# Patient Record
Sex: FEMALE | Race: WHITE | Employment: UNEMPLOYED | URBAN - METROPOLITAN AREA
[De-identification: names, ages, dates, MRNs, and addresses within clinical notes are randomized per-mention and may not be internally consistent; named-entity substitution may affect disease eponyms.]

---

## 2017-04-21 ENCOUNTER — APPOINTMENT (OUTPATIENT)
Dept: GENERAL RADIOLOGY | Age: 22
End: 2017-04-21
Payer: MEDICARE

## 2017-04-21 ENCOUNTER — HOSPITAL ENCOUNTER (EMERGENCY)
Age: 22
Discharge: HOME | End: 2017-04-21
Attending: EMERGENCY MEDICINE
Payer: MEDICARE

## 2017-04-21 VITALS
WEIGHT: 255 LBS | TEMPERATURE: 98.6 F | OXYGEN SATURATION: 96 % | DIASTOLIC BLOOD PRESSURE: 82 MMHG | BODY MASS INDEX: 45.18 KG/M2 | HEIGHT: 63 IN | RESPIRATION RATE: 16 BRPM | SYSTOLIC BLOOD PRESSURE: 124 MMHG | HEART RATE: 87 BPM

## 2017-04-21 DIAGNOSIS — J06.9 VIRAL URI WITH COUGH: Primary | ICD-10-CM

## 2017-04-21 PROCEDURE — 6360000002 HC RX W HCPCS: Performed by: EMERGENCY MEDICINE

## 2017-04-21 PROCEDURE — 71020 XR CHEST STANDARD TWO VW: CPT

## 2017-04-21 PROCEDURE — 99283 EMERGENCY DEPT VISIT LOW MDM: CPT

## 2017-04-21 RX ORDER — ONDANSETRON 4 MG/1
4 TABLET, FILM COATED ORAL ONCE
Status: COMPLETED | OUTPATIENT
Start: 2017-04-21 | End: 2017-04-21

## 2017-04-21 RX ORDER — BENZONATATE 200 MG/1
200 CAPSULE ORAL 3 TIMES DAILY PRN
Qty: 30 CAPSULE | Refills: 0 | Status: SHIPPED | OUTPATIENT
Start: 2017-04-21 | End: 2017-07-16

## 2017-04-21 RX ORDER — ONDANSETRON 4 MG/1
4 TABLET, FILM COATED ORAL EVERY 8 HOURS PRN
Qty: 20 TABLET | Refills: 0 | Status: SHIPPED | OUTPATIENT
Start: 2017-04-21 | End: 2019-03-08

## 2017-04-21 RX ADMIN — ONDANSETRON 4 MG: 4 TABLET, FILM COATED ORAL at 18:32

## 2017-04-21 ASSESSMENT — PAIN DESCRIPTION - DESCRIPTORS: DESCRIPTORS: BURNING

## 2017-04-21 ASSESSMENT — ENCOUNTER SYMPTOMS
NAUSEA: 1
VOMITING: 1
ABDOMINAL PAIN: 0
SHORTNESS OF BREATH: 1
COUGH: 1
WHEEZING: 0
RHINORRHEA: 1
COLOR CHANGE: 0

## 2017-04-21 ASSESSMENT — PAIN DESCRIPTION - LOCATION: LOCATION: THROAT

## 2017-04-21 ASSESSMENT — PAIN DESCRIPTION - FREQUENCY: FREQUENCY: CONTINUOUS

## 2017-04-21 ASSESSMENT — PAIN DESCRIPTION - PAIN TYPE: TYPE: ACUTE PAIN

## 2017-04-21 ASSESSMENT — PAIN DESCRIPTION - ORIENTATION: ORIENTATION: RIGHT;LEFT

## 2017-04-21 ASSESSMENT — PAIN SCALES - GENERAL: PAINLEVEL_OUTOF10: 3

## 2017-07-16 ENCOUNTER — HOSPITAL ENCOUNTER (EMERGENCY)
Age: 22
Discharge: HOME | End: 2017-07-16
Attending: EMERGENCY MEDICINE
Payer: MEDICARE

## 2017-07-16 VITALS
DIASTOLIC BLOOD PRESSURE: 74 MMHG | WEIGHT: 259.92 LBS | BODY MASS INDEX: 46.05 KG/M2 | RESPIRATION RATE: 22 BRPM | HEIGHT: 63 IN | OXYGEN SATURATION: 97 % | SYSTOLIC BLOOD PRESSURE: 122 MMHG | HEART RATE: 78 BPM | TEMPERATURE: 98.8 F

## 2017-07-16 DIAGNOSIS — N93.8 DUB (DYSFUNCTIONAL UTERINE BLEEDING): Primary | ICD-10-CM

## 2017-07-16 LAB — HCG(URINE) PREGNANCY TEST: NEGATIVE

## 2017-07-16 PROCEDURE — 99284 EMERGENCY DEPT VISIT MOD MDM: CPT

## 2017-07-16 PROCEDURE — 81003 URINALYSIS AUTO W/O SCOPE: CPT

## 2017-07-16 PROCEDURE — 84703 CHORIONIC GONADOTROPIN ASSAY: CPT

## 2017-07-16 ASSESSMENT — PAIN DESCRIPTION - LOCATION: LOCATION: ABDOMEN

## 2017-07-16 ASSESSMENT — PAIN DESCRIPTION - ORIENTATION: ORIENTATION: LOWER

## 2017-07-16 ASSESSMENT — PAIN DESCRIPTION - PAIN TYPE: TYPE: ACUTE PAIN

## 2017-07-16 ASSESSMENT — ENCOUNTER SYMPTOMS
EYES NEGATIVE: 1
RESPIRATORY NEGATIVE: 1

## 2017-07-16 ASSESSMENT — PAIN SCALES - GENERAL: PAINLEVEL_OUTOF10: 7

## 2017-11-15 ENCOUNTER — HOSPITAL ENCOUNTER (EMERGENCY)
Age: 22
Discharge: HOME | End: 2017-11-15
Attending: EMERGENCY MEDICINE
Payer: MEDICARE

## 2017-11-15 ENCOUNTER — APPOINTMENT (OUTPATIENT)
Dept: CT IMAGING | Age: 22
End: 2017-11-15
Payer: MEDICARE

## 2017-11-15 VITALS
SYSTOLIC BLOOD PRESSURE: 135 MMHG | TEMPERATURE: 97.7 F | DIASTOLIC BLOOD PRESSURE: 84 MMHG | OXYGEN SATURATION: 97 % | BODY MASS INDEX: 43.77 KG/M2 | WEIGHT: 247 LBS | HEIGHT: 63 IN | HEART RATE: 69 BPM | RESPIRATION RATE: 16 BRPM
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
## 2017-11-15 DIAGNOSIS — S40.021A: ICD-10-CM

## 2017-11-15 DIAGNOSIS — S40.022A: ICD-10-CM

## 2017-11-15 DIAGNOSIS — S80.10XA CONTUSION OF MULTIPLE SITES OF LOWER EXTREMITY, UNSPECIFIED LATERALITY, INITIAL ENCOUNTER: ICD-10-CM
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
## 2017-11-15 DIAGNOSIS — S19.9XXA SOFT TISSUE INJURY OF NECK, INITIAL ENCOUNTER: ICD-10-CM

## 2017-11-15 LAB
ABSOLUTE EOS #: 0 K/UL (ref 0–0.4)
ABSOLUTE IMMATURE GRANULOCYTE: ABNORMAL K/UL (ref 0–0.3)
ABSOLUTE LYMPH #: 1.9 K/UL (ref 1–4.8)
ABSOLUTE MONO #: 0.5 K/UL (ref 0.2–0.8)
ANION GAP SERPL CALCULATED.3IONS-SCNC: 14 MMOL/L (ref 9–17)
BASOPHILS # BLD: 1 %
BASOPHILS ABSOLUTE: 0 K/UL (ref 0–0.2)
BUN BLDV-MCNC: 9 MG/DL (ref 6–20)
BUN/CREAT BLD: 13 (ref 9–20)
CALCIUM SERPL-MCNC: 9.2 MG/DL (ref 8.6–10.4)
CHLORIDE BLD-SCNC: 103 MMOL/L (ref 98–107)
CO2: 22 MMOL/L (ref 20–31)
CREAT SERPL-MCNC: 0.69 MG/DL (ref 0.5–0.9)
DIFFERENTIAL TYPE: ABNORMAL
EOSINOPHILS RELATIVE PERCENT: 1 %
GFR AFRICAN AMERICAN: >60 ML/MIN
GFR NON-AFRICAN AMERICAN: >60 ML/MIN
GFR SERPL CREATININE-BSD FRML MDRD: ABNORMAL ML/MIN/{1.73_M2}
GFR SERPL CREATININE-BSD FRML MDRD: ABNORMAL ML/MIN/{1.73_M2}
GLUCOSE BLD-MCNC: 101 MG/DL (ref 70–99)
HCT VFR BLD CALC: 42.1 % (ref 36–46)
HEMOGLOBIN: 13.9 G/DL (ref 12–16)
IMMATURE GRANULOCYTES: ABNORMAL %
LYMPHOCYTES # BLD: 31 %
MCH RBC QN AUTO: 28.6 PG (ref 26–34)
MCHC RBC AUTO-ENTMCNC: 33.1 G/DL (ref 31–37)
MCV RBC AUTO: 86.4 FL (ref 80–100)
MONOCYTES # BLD: 9 %
PDW BLD-RTO: 16.2 % (ref 11.5–14.5)
PLATELET # BLD: 175 K/UL (ref 130–400)
PLATELET ESTIMATE: ABNORMAL
PMV BLD AUTO: 8.9 FL (ref 6–12)
POTASSIUM SERPL-SCNC: 3.6 MMOL/L (ref 3.7–5.3)
RBC # BLD: 4.88 M/UL (ref 4–5.2)
RBC # BLD: ABNORMAL 10*6/UL
SEG NEUTROPHILS: 58 %
SEGMENTED NEUTROPHILS ABSOLUTE COUNT: 3.6 K/UL (ref 1.8–7.7)
SODIUM BLD-SCNC: 139 MMOL/L (ref 135–144)
WBC # BLD: 6.1 K/UL (ref 3.5–11)
WBC # BLD: ABNORMAL 10*3/UL

## 2017-11-15 PROCEDURE — 84703 CHORIONIC GONADOTROPIN ASSAY: CPT

## 2017-11-15 PROCEDURE — 99285 EMERGENCY DEPT VISIT HI MDM: CPT

## 2017-11-15 PROCEDURE — 96374 THER/PROPH/DIAG INJ IV PUSH: CPT

## 2017-11-15 PROCEDURE — 2580000003 HC RX 258: Performed by: NURSE PRACTITIONER

## 2017-11-15 PROCEDURE — 6360000002 HC RX W HCPCS: Performed by: NURSE PRACTITIONER

## 2017-11-15 PROCEDURE — 80048 BASIC METABOLIC PNL TOTAL CA: CPT

## 2017-11-15 PROCEDURE — 70498 CT ANGIOGRAPHY NECK: CPT

## 2017-11-15 PROCEDURE — 81003 URINALYSIS AUTO W/O SCOPE: CPT

## 2017-11-15 PROCEDURE — 6360000004 HC RX CONTRAST MEDICATION: Performed by: NURSE PRACTITIONER

## 2017-11-15 PROCEDURE — 85025 COMPLETE CBC W/AUTO DIFF WBC: CPT

## 2017-11-15 RX ORDER — 0.9 % SODIUM CHLORIDE 0.9 %
1000 INTRAVENOUS SOLUTION INTRAVENOUS ONCE
Status: COMPLETED | OUTPATIENT
Start: 2017-11-15 | End: 2017-11-15

## 2017-11-15 RX ORDER — ESCITALOPRAM OXALATE 10 MG/1
10 TABLET ORAL DAILY
COMMUNITY
End: 2019-03-08

## 2017-11-15 RX ORDER — SODIUM CHLORIDE 0.9 % (FLUSH) 0.9 %
10 SYRINGE (ML) INJECTION
Status: COMPLETED | OUTPATIENT
Start: 2017-11-15 | End: 2017-11-15

## 2017-11-15 RX ORDER — LORAZEPAM 2 MG/ML
1 INJECTION INTRAMUSCULAR ONCE
Status: COMPLETED | OUTPATIENT
Start: 2017-11-15 | End: 2017-11-15

## 2017-11-15 RX ORDER — 0.9 % SODIUM CHLORIDE 0.9 %
50 INTRAVENOUS SOLUTION INTRAVENOUS ONCE
Status: COMPLETED | OUTPATIENT
Start: 2017-11-15 | End: 2017-11-15

## 2017-11-15 RX ADMIN — IOPAMIDOL 100 ML: 755 INJECTION, SOLUTION INTRAVENOUS at 17:15

## 2017-11-15 RX ADMIN — Medication 10 ML: at 17:15

## 2017-11-15 RX ADMIN — SODIUM CHLORIDE 50 ML: 9 INJECTION, SOLUTION INTRAVENOUS at 17:15

## 2017-11-15 RX ADMIN — SODIUM CHLORIDE 1000 ML: 9 INJECTION, SOLUTION INTRAVENOUS at 17:10

## 2017-11-15 RX ADMIN — LORAZEPAM 1 MG: 2 INJECTION INTRAMUSCULAR at 17:09

## 2017-11-15 ASSESSMENT — PAIN SCALES - GENERAL: PAINLEVEL_OUTOF10: 8
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
## 2017-11-16 LAB — HCG, PREGNANCY URINE (POC): NEGATIVE

## 2018-01-25 ENCOUNTER — HOSPITAL ENCOUNTER (EMERGENCY)
Age: 23
Discharge: HOME | End: 2018-01-25
Attending: EMERGENCY MEDICINE
Payer: MEDICARE

## 2018-01-25 VITALS
RESPIRATION RATE: 18 BRPM | SYSTOLIC BLOOD PRESSURE: 115 MMHG | WEIGHT: 248.06 LBS | DIASTOLIC BLOOD PRESSURE: 73 MMHG | TEMPERATURE: 97.4 F | HEART RATE: 87 BPM | HEIGHT: 63 IN | BODY MASS INDEX: 43.95 KG/M2 | OXYGEN SATURATION: 98 %

## 2018-01-25 DIAGNOSIS — B34.9 VIRAL ILLNESS: Primary | ICD-10-CM

## 2018-01-25 LAB
CHP ED QC CHECK: YES
DIRECT EXAM: NORMAL
Lab: NORMAL
PREGNANCY TEST URINE, POC: NEGATIVE
SPECIMEN DESCRIPTION: NORMAL
STATUS: NORMAL

## 2018-01-25 PROCEDURE — 84703 CHORIONIC GONADOTROPIN ASSAY: CPT

## 2018-01-25 PROCEDURE — 99283 EMERGENCY DEPT VISIT LOW MDM: CPT

## 2018-01-25 PROCEDURE — 87651 STREP A DNA AMP PROBE: CPT

## 2018-01-25 PROCEDURE — 81003 URINALYSIS AUTO W/O SCOPE: CPT

## 2018-01-25 RX ORDER — ONDANSETRON 4 MG/1
4 TABLET, ORALLY DISINTEGRATING ORAL EVERY 8 HOURS PRN
Qty: 20 TABLET | Refills: 0 | Status: SHIPPED | OUTPATIENT
Start: 2018-01-25 | End: 2019-03-11

## 2018-01-25 ASSESSMENT — ENCOUNTER SYMPTOMS
CONSTIPATION: 0
TROUBLE SWALLOWING: 0
SORE THROAT: 1
DIARRHEA: 0
VOMITING: 1
COUGH: 0
NAUSEA: 1
SHORTNESS OF BREATH: 0
ABDOMINAL PAIN: 0

## 2018-01-25 ASSESSMENT — PAIN DESCRIPTION - DESCRIPTORS: DESCRIPTORS: STABBING;SHARP

## 2018-01-25 ASSESSMENT — PAIN DESCRIPTION - LOCATION: LOCATION: THROAT

## 2018-01-25 ASSESSMENT — PAIN SCALES - GENERAL: PAINLEVEL_OUTOF10: 7

## 2018-01-26 LAB
DIRECT EXAM: NORMAL
DIRECT EXAM: NORMAL
HCG, PREGNANCY URINE (POC): NEGATIVE
Lab: NORMAL
Lab: NORMAL
SPECIMEN DESCRIPTION: NORMAL
SPECIMEN DESCRIPTION: NORMAL
STATUS: NORMAL

## 2018-01-26 NOTE — ED PROVIDER NOTES
[address] [place] [address] ED  eMERGENCY dEPARTMENT eNCOUnter      Pt Name: Curtis Villalobos  MRN: [id number]  [place] 1995  Date of evaluation: 1/25/2018  Provider: Ezra Pritchard NP    [address] [place] [address]       Chief Complaint   Patient presents with    Pharyngitis     past 2 wks / tx at  yesterday    Emesis     intermittent past 2 wks         HISTORY OF PRESENT ILLNESS  (Location/Symptom, Timing/Onset, Context/Setting, Quality, Duration, Modifying Factors, Severity.)   Curtis Villalobos is a 23 y.o. female who presents to the emergency department Review of private auto with complaint of sore throat that started 2 weeks ago. Additional symptoms include constant nausea and occasional emesis with body aches and some blurred vision. She was seen at an urgent care 2 days ago and had a negative strep screen. She was started on a Medrol Dosepak which she started 2 days ago but has not had any improvement. She denies fevers. No abdominal pain, back pain or urinary complaints. LMP was January 15. Nursing Notes were reviewed.     ALLERGIES     Sulfa antibiotics    CURRENT MEDICATIONS       Discharge Medication List as of 1/25/2018 10:32 PM      CONTINUE these medications which have NOT CHANGED    Details   AMOXICILLIN PO Take by mouth Unsure of dose / started 1/24/18Historical Med      MethylPREDNISolone (MEDROL PO) Take by mouth dosepak started 1/24/18Historical Med      escitalopram (LEXAPRO) 10 MG tablet Take 10 mg by mouth dailyHistorical Med      lurasidone (LATUDA) 20 MG TABS tablet Take 20 mg by mouth dailyHistorical Med      Prazosin HCl (MINIPRESS PO) Take by mouth dailyHistorical Med      ondansetron (ZOFRAN) 4 MG tablet Take 1 tablet by mouth every 8 hours as needed for Nausea, Disp-20 tablet, R-0Print      albuterol sulfate HFA (PROVENTIL HFA) 108 (90 BASE) MCG/ACT inhaler Inhale 1-2 puffs into the lungs every 4 hours as needed for Wheezing or Shortness of Breath (Space out to every 6 hours as symptoms

## 2018-07-01 ENCOUNTER — APPOINTMENT (OUTPATIENT)
Dept: GENERAL RADIOLOGY | Age: 23
End: 2018-07-01
Payer: MEDICARE

## 2018-07-01 ENCOUNTER — HOSPITAL ENCOUNTER (EMERGENCY)
Age: 23
Discharge: HOME | End: 2018-07-01
Attending: EMERGENCY MEDICINE
Payer: MEDICARE

## 2018-07-01 VITALS
HEIGHT: 63 IN | OXYGEN SATURATION: 99 % | DIASTOLIC BLOOD PRESSURE: 80 MMHG | BODY MASS INDEX: 44.35 KG/M2 | TEMPERATURE: 98.4 F | RESPIRATION RATE: 18 BRPM | SYSTOLIC BLOOD PRESSURE: 117 MMHG | WEIGHT: 250.3 LBS | HEART RATE: 76 BPM

## 2018-07-01 DIAGNOSIS — N39.0 URINARY TRACT INFECTION WITHOUT HEMATURIA, SITE UNSPECIFIED: ICD-10-CM

## 2018-07-01 DIAGNOSIS — J40 BRONCHITIS: Primary | ICD-10-CM

## 2018-07-01 LAB
-: ABNORMAL
AMORPHOUS: ABNORMAL
BACTERIA: ABNORMAL
BILIRUBIN URINE: NEGATIVE
CASTS UA: ABNORMAL /LPF
CHP ED QC CHECK: NORMAL
COLOR: YELLOW
COMMENT UA: ABNORMAL
CRYSTALS, UA: ABNORMAL /HPF
EPITHELIAL CELLS UA: ABNORMAL /HPF (ref 0–5)
GLUCOSE URINE: NEGATIVE
KETONES, URINE: NEGATIVE
LEUKOCYTE ESTERASE, URINE: ABNORMAL
MUCUS: ABNORMAL
NITRITE, URINE: NEGATIVE
OTHER OBSERVATIONS UA: ABNORMAL
PH UA: 5.5 (ref 5–8)
PREGNANCY TEST URINE, POC: NEGATIVE
PROTEIN UA: NEGATIVE
RBC UA: ABNORMAL /HPF (ref 0–2)
RENAL EPITHELIAL, UA: ABNORMAL /HPF
SPECIFIC GRAVITY UA: 1 (ref 1–1.03)
TRICHOMONAS: ABNORMAL
TURBIDITY: CLEAR
URINE HGB: NEGATIVE
UROBILINOGEN, URINE: NORMAL
WBC UA: ABNORMAL /HPF (ref 0–5)
YEAST: ABNORMAL

## 2018-07-01 PROCEDURE — 87086 URINE CULTURE/COLONY COUNT: CPT

## 2018-07-01 PROCEDURE — 71046 X-RAY EXAM CHEST 2 VIEWS: CPT

## 2018-07-01 PROCEDURE — 99283 EMERGENCY DEPT VISIT LOW MDM: CPT

## 2018-07-01 PROCEDURE — 81001 URINALYSIS AUTO W/SCOPE: CPT

## 2018-07-01 RX ORDER — ONDANSETRON 4 MG/1
4 TABLET, ORALLY DISINTEGRATING ORAL EVERY 8 HOURS PRN
Qty: 20 TABLET | Refills: 0 | Status: SHIPPED | OUTPATIENT
Start: 2018-07-01 | End: 2019-03-08

## 2018-07-01 RX ORDER — CEPHALEXIN 500 MG/1
500 CAPSULE ORAL 2 TIMES DAILY
Qty: 10 CAPSULE | Refills: 0 | Status: SHIPPED | OUTPATIENT
Start: 2018-07-01 | End: 2018-07-06

## 2018-07-01 RX ORDER — GUAIFENESIN AND CODEINE PHOSPHATE 100; 10 MG/5ML; MG/5ML
5 SOLUTION ORAL 3 TIMES DAILY PRN
Qty: 120 ML | Refills: 0 | Status: SHIPPED | OUTPATIENT
Start: 2018-07-01 | End: 2018-07-08

## 2018-07-01 RX ORDER — ALBUTEROL SULFATE 90 UG/1
1-2 AEROSOL, METERED RESPIRATORY (INHALATION) EVERY 4 HOURS PRN
Qty: 1 INHALER | Refills: 0 | Status: SHIPPED | OUTPATIENT
Start: 2018-07-01 | End: 2019-03-08

## 2018-07-01 ASSESSMENT — PAIN SCALES - GENERAL: PAINLEVEL_OUTOF10: 8

## 2018-07-01 ASSESSMENT — PAIN SCALES - WONG BAKER: WONGBAKER_NUMERICALRESPONSE: 8

## 2018-07-01 ASSESSMENT — PAIN DESCRIPTION - LOCATION: LOCATION: CHEST;HEAD;BACK

## 2018-07-01 ASSESSMENT — ENCOUNTER SYMPTOMS
ABDOMINAL PAIN: 0
NAUSEA: 0
COUGH: 1
CHEST TIGHTNESS: 0
BACK PAIN: 1
VOMITING: 0
TROUBLE SWALLOWING: 0
SHORTNESS OF BREATH: 0
SORE THROAT: 0

## 2018-07-01 ASSESSMENT — PAIN DESCRIPTION - DESCRIPTORS: DESCRIPTORS: ACHING

## 2018-07-01 NOTE — ED NOTES
Ill today with cough and congestion with sore throat and body aches smokes taking mucinex without relief. Harsh cough noted resp non labored abd obese soft.      Monika Domingo RN  07/01/18 [id number]

## 2018-07-02 LAB
CULTURE: NORMAL
Lab: NORMAL
SPECIMEN DESCRIPTION: NORMAL
STATUS: NORMAL

## 2018-08-06 ENCOUNTER — APPOINTMENT (OUTPATIENT)
Dept: GENERAL RADIOLOGY | Age: 23
End: 2018-08-06
Payer: MEDICARE

## 2018-08-06 ENCOUNTER — HOSPITAL ENCOUNTER (EMERGENCY)
Age: 23
Discharge: HOME | End: 2018-08-06
Attending: EMERGENCY MEDICINE
Payer: MEDICARE

## 2018-08-06 VITALS
SYSTOLIC BLOOD PRESSURE: 108 MMHG | OXYGEN SATURATION: 96 % | TEMPERATURE: 98.5 F | HEIGHT: 63 IN | HEART RATE: 80 BPM | RESPIRATION RATE: 16 BRPM | DIASTOLIC BLOOD PRESSURE: 69 MMHG | WEIGHT: 263 LBS | BODY MASS INDEX: 46.6 KG/M2

## 2018-08-06 DIAGNOSIS — R09.1 PLEURISY: Primary | ICD-10-CM

## 2018-08-06 LAB
ABSOLUTE EOS #: 0.3 K/UL (ref 0–0.4)
ABSOLUTE IMMATURE GRANULOCYTE: ABNORMAL K/UL (ref 0–0.3)
ABSOLUTE LYMPH #: 2.1 K/UL (ref 1–4.8)
ABSOLUTE MONO #: 0.6 K/UL (ref 0.2–0.8)
ANION GAP SERPL CALCULATED.3IONS-SCNC: 11 MMOL/L (ref 9–17)
BASOPHILS # BLD: 1 % (ref 0–2)
BASOPHILS ABSOLUTE: 0.1 K/UL (ref 0–0.2)
BUN BLDV-MCNC: 14 MG/DL (ref 6–20)
BUN/CREAT BLD: 27 (ref 9–20)
CALCIUM SERPL-MCNC: 9 MG/DL (ref 8.6–10.4)
CHLORIDE BLD-SCNC: 106 MMOL/L (ref 98–107)
CO2: 24 MMOL/L (ref 20–31)
CREAT SERPL-MCNC: 0.52 MG/DL (ref 0.5–0.9)
D-DIMER QUANTITATIVE: 0.41 MG/L FEU
DIFFERENTIAL TYPE: ABNORMAL
EKG ATRIAL RATE: 75 BPM
EKG P AXIS: 44 DEGREES
EKG P-R INTERVAL: 142 MS
EKG Q-T INTERVAL: 398 MS
EKG QRS DURATION: 84 MS
EKG QTC CALCULATION (BAZETT): 444 MS
EKG R AXIS: 30 DEGREES
EKG T AXIS: -5 DEGREES
EKG VENTRICULAR RATE: 75 BPM
EOSINOPHILS RELATIVE PERCENT: 3 % (ref 1–4)
GFR AFRICAN AMERICAN: >60 ML/MIN
GFR NON-AFRICAN AMERICAN: >60 ML/MIN
GFR SERPL CREATININE-BSD FRML MDRD: ABNORMAL ML/MIN/{1.73_M2}
GFR SERPL CREATININE-BSD FRML MDRD: ABNORMAL ML/MIN/{1.73_M2}
GLUCOSE BLD-MCNC: 100 MG/DL (ref 70–99)
HCT VFR BLD CALC: 41.5 % (ref 36–46)
HEMOGLOBIN: 13.7 G/DL (ref 12–16)
IMMATURE GRANULOCYTES: ABNORMAL %
LYMPHOCYTES # BLD: 20 % (ref 24–44)
MCH RBC QN AUTO: 29.9 PG (ref 26–34)
MCHC RBC AUTO-ENTMCNC: 33 G/DL (ref 31–37)
MCV RBC AUTO: 90.8 FL (ref 80–100)
MONOCYTES # BLD: 6 % (ref 1–7)
NRBC AUTOMATED: ABNORMAL PER 100 WBC
PDW BLD-RTO: 15 % (ref 11.5–14.5)
PLATELET # BLD: 206 K/UL (ref 130–400)
PLATELET ESTIMATE: ABNORMAL
PMV BLD AUTO: 8.6 FL (ref 6–12)
POTASSIUM SERPL-SCNC: 4 MMOL/L (ref 3.7–5.3)
RBC # BLD: 4.57 M/UL (ref 4–5.2)
RBC # BLD: ABNORMAL 10*6/UL
SEG NEUTROPHILS: 70 % (ref 36–66)
SEGMENTED NEUTROPHILS ABSOLUTE COUNT: 7.3 K/UL (ref 1.8–7.7)
SODIUM BLD-SCNC: 141 MMOL/L (ref 135–144)
WBC # BLD: 10.4 K/UL (ref 3.5–11)
WBC # BLD: ABNORMAL 10*3/UL

## 2018-08-06 PROCEDURE — 80048 BASIC METABOLIC PNL TOTAL CA: CPT

## 2018-08-06 PROCEDURE — 85025 COMPLETE CBC W/AUTO DIFF WBC: CPT

## 2018-08-06 PROCEDURE — 71101 X-RAY EXAM UNILAT RIBS/CHEST: CPT

## 2018-08-06 PROCEDURE — 99284 EMERGENCY DEPT VISIT MOD MDM: CPT

## 2018-08-06 PROCEDURE — 93005 ELECTROCARDIOGRAM TRACING: CPT

## 2018-08-06 PROCEDURE — 96374 THER/PROPH/DIAG INJ IV PUSH: CPT

## 2018-08-06 PROCEDURE — 85379 FIBRIN DEGRADATION QUANT: CPT

## 2018-08-06 PROCEDURE — 6360000002 HC RX W HCPCS: Performed by: PHYSICIAN ASSISTANT

## 2018-08-06 RX ORDER — KETOROLAC TROMETHAMINE 30 MG/ML
30 INJECTION, SOLUTION INTRAMUSCULAR; INTRAVENOUS ONCE
Status: COMPLETED | OUTPATIENT
Start: 2018-08-06 | End: 2018-08-06

## 2018-08-06 RX ADMIN — KETOROLAC TROMETHAMINE 30 MG: 30 INJECTION, SOLUTION INTRAMUSCULAR at 15:48

## 2018-08-06 ASSESSMENT — PAIN SCALES - WONG BAKER: WONGBAKER_NUMERICALRESPONSE: 6

## 2018-08-06 ASSESSMENT — PAIN SCALES - GENERAL
PAINLEVEL_OUTOF10: 6
PAINLEVEL_OUTOF10: 6

## 2018-08-06 ASSESSMENT — PAIN DESCRIPTION - DESCRIPTORS: DESCRIPTORS: TIGHTNESS;PRESSURE

## 2018-08-06 ASSESSMENT — PAIN DESCRIPTION - LOCATION: LOCATION: RIB CAGE;CHEST

## 2018-08-06 ASSESSMENT — PAIN DESCRIPTION - FREQUENCY: FREQUENCY: CONTINUOUS

## 2018-08-06 ASSESSMENT — PAIN DESCRIPTION - ORIENTATION: ORIENTATION: LEFT

## 2018-08-06 NOTE — ED PROVIDER NOTES
(LEXAPRO) 10 MG tablet Take 10 mg by mouth dailyHistorical Med      lurasidone (LATUDA) 20 MG TABS tablet Take 20 mg by mouth dailyHistorical Med      Prazosin HCl (MINIPRESS PO) Take by mouth dailyHistorical Med      ondansetron (ZOFRAN) 4 MG tablet Take 1 tablet by mouth every 8 hours as needed for Nausea, Disp-20 tablet, R-0Print      naproxen (NAPROSYN) 500 MG tablet Take 1 tablet by mouth 2 times daily (with meals), Disp-60 tablet, R-1      calcium carbonate (CALCIUM 600) 600 MG TABS tablet Take 1 tablet by mouth 2 times daily, Disp-60 tablet, R-5      Cholecalciferol (VITAMIN D) 2000 UNITS CAPS capsule Take 1 capsule by mouth daily, Disp-30 capsule, R-5Normal       !! - Potential duplicate medications found. Please discuss with provider. PAST MEDICAL HISTORY         Diagnosis Date    Anemia     Anxiety     Bipolar disorder ([address]     Depression     Obesity     Seizures ([facility] [address] [facility]        SURGICAL HISTORY           Procedure Laterality Date    CHOLECYSTECTOMY, LAPAROSCOPIC  5-20-16    ENDOMETRIAL ABLATION      EYE SURGERY Left     cyst removed    HERNIA REPAIR      INTRAUTERINE DEVICE INSERTION      removed July 16, 2015    TONSILLECTOMY AND ADENOIDECTOMY      TUBAL LIGATION      WISDOM TOOTH EXTRACTION           FAMILY HISTORY       Family History   Problem Relation Age of Onset    Diabetes Maternal Grandmother     Heart Disease Maternal Grandmother      Family Status   Relation Status    MGM (Not Specified)        SOCIAL HISTORY      reports that she has been smoking Cigarettes. She has a 5.00 pack-year smoking history. She has never used smokeless tobacco. She reports that she uses drugs, including Marijuana, about 7 times per week. She reports that she does not drink alcohol. REVIEW OF SYSTEMS    (2-9 systems for level 4, 10 or more for level 5)   Review of Systems    Except as noted above the remainder of the review of systems was reviewed and negative.      PHYSICAL EXAM    (up to 7 for level 4, 8 or more for level 5)     ED Triage Vitals [08/06/18 1418]   BP Temp Temp Source Pulse Resp SpO2 Height Weight   108/69 98.5 °F (36.9 °C) Oral 80 16 96 % 5' 3\" (1.6 m) 263 lb (119.3 kg)     Physical Exam   Constitutional: She is oriented to person, place, and time. She appears well-developed. HENT:   Head: Normocephalic and atraumatic. Neck: Normal range of motion. Neck supple. Cardiovascular: Normal rate and regular rhythm. Pulmonary/Chest: Effort normal and breath sounds normal.   Abdominal: Soft. There is no tenderness. There is no guarding and no CVA tenderness. Musculoskeletal: Normal range of motion. Neurological: She is alert and oriented to person, place, and time. Skin: Skin is warm. No rash noted. Psychiatric: She has a normal mood and affect. Her behavior is normal.               DIAGNOSTIC RESULTS     EKG: All EKG's are interpreted by the Emergency Department Physician who either signs or Co-signs this chart in the absence of a cardiologist.    EKG interpreted by ED physician. Time 1531.  75 beats per minute. Normal axis. No ST segment elevation.     RADIOLOGY:   Non-plain film images such as CT, Ultrasound and MRI are read by the radiologist. Plain radiographic images are visualized and preliminarily interpreted by the emergency physician with the below findings:        Interpretation per the Radiologist below, if available at the time of this note:          ED BEDSIDE ULTRASOUND:   Performed by ED Physician - none    LABS:  Labs Reviewed   CBC WITH AUTO DIFFERENTIAL - Abnormal; Notable for the following:        Result Value    RDW 15.0 (*)     Seg Neutrophils 70 (*)     Lymphocytes 20 (*)     All other components within normal limits   BASIC METABOLIC PANEL - Abnormal; Notable for the following:     Glucose 100 (*)     Bun/Cre Ratio 27 (*)     All other components within normal limits   D-DIMER, QUANTITATIVE       All other labs were within normal range or not returned as of this dictation. EMERGENCY DEPARTMENT COURSE and DIFFERENTIAL DIAGNOSIS/MDM:   Vitals:    Vitals:    08/06/18 1418   BP: 108/69   Pulse: 80   Resp: 16   Temp: 98.5 °F (36.9 °C)   TempSrc: Oral   SpO2: 96%   Weight: 263 lb (119.3 kg)   Height: 5' 3\" (1.6 m)     Patient will be discharged home. D-dimer negative. Chest x-ray negative. Labs within normal limits as well. CONSULTS:  None    PROCEDURES:  Procedures        FINAL IMPRESSION      1.  Pleurisy          DISPOSITION/PLAN   DISPOSITION Decision To Discharge 08/06/2018 04:52:24 PM      PATIENT REFERRED TO:   GIO Lr - CNP  [id number]    In 3 days        DISCHARGE MEDICATIONS:     Discharge Medication List as of 8/6/2018  4:55 PM              (Please note that portions of this note were completed with a voice recognition program.  Efforts were made to edit the dictations but occasionally words are mis-transcribed.)    GEORGE Gillis PA-C  08/06/18 1929

## 2019-03-06 ENCOUNTER — HOSPITAL ENCOUNTER (EMERGENCY)
Age: 24
Discharge: HOME | End: 2019-03-06
Attending: EMERGENCY MEDICINE
Payer: MEDICARE

## 2019-03-06 VITALS
BODY MASS INDEX: 46.78 KG/M2 | WEIGHT: 264 LBS | HEIGHT: 63 IN | DIASTOLIC BLOOD PRESSURE: 56 MMHG | TEMPERATURE: 97.9 F | RESPIRATION RATE: 16 BRPM | SYSTOLIC BLOOD PRESSURE: 111 MMHG | HEART RATE: 76 BPM | OXYGEN SATURATION: 98 %

## 2019-03-06 DIAGNOSIS — S61.411A LACERATION OF RIGHT HAND WITHOUT FOREIGN BODY, INITIAL ENCOUNTER: Primary | ICD-10-CM

## 2019-03-06 PROCEDURE — 2500000003 HC RX 250 WO HCPCS: Performed by: PHYSICIAN ASSISTANT

## 2019-03-06 PROCEDURE — 6370000000 HC RX 637 (ALT 250 FOR IP): Performed by: PHYSICIAN ASSISTANT

## 2019-03-06 PROCEDURE — 90471 IMMUNIZATION ADMIN: CPT | Performed by: PHYSICIAN ASSISTANT

## 2019-03-06 PROCEDURE — 12001 RPR S/N/AX/GEN/TRNK 2.5CM/<: CPT

## 2019-03-06 PROCEDURE — 6360000002 HC RX W HCPCS: Performed by: PHYSICIAN ASSISTANT

## 2019-03-06 PROCEDURE — 90715 TDAP VACCINE 7 YRS/> IM: CPT | Performed by: PHYSICIAN ASSISTANT

## 2019-03-06 PROCEDURE — 99282 EMERGENCY DEPT VISIT SF MDM: CPT

## 2019-03-06 RX ORDER — LIDOCAINE HYDROCHLORIDE 10 MG/ML
20 INJECTION, SOLUTION INFILTRATION; PERINEURAL ONCE
Status: COMPLETED | OUTPATIENT
Start: 2019-03-06 | End: 2019-03-06

## 2019-03-06 RX ORDER — ACETAMINOPHEN 500 MG
1000 TABLET ORAL ONCE
Status: COMPLETED | OUTPATIENT
Start: 2019-03-06 | End: 2019-03-06

## 2019-03-06 RX ADMIN — ACETAMINOPHEN 1000 MG: 500 TABLET ORAL at 15:47

## 2019-03-06 RX ADMIN — LIDOCAINE HYDROCHLORIDE 20 ML: 10 INJECTION, SOLUTION INFILTRATION; PERINEURAL at 15:48

## 2019-03-06 RX ADMIN — TETANUS TOXOID, REDUCED DIPHTHERIA TOXOID AND ACELLULAR PERTUSSIS VACCINE, ADSORBED 0.5 ML: 5; 2.5; 8; 8; 2.5 SUSPENSION INTRAMUSCULAR at 16:38

## 2019-03-06 ASSESSMENT — PAIN DESCRIPTION - LOCATION: LOCATION: HAND

## 2019-03-06 ASSESSMENT — PAIN DESCRIPTION - ORIENTATION: ORIENTATION: RIGHT

## 2019-03-06 ASSESSMENT — PAIN SCALES - GENERAL
PAINLEVEL_OUTOF10: 8
PAINLEVEL_OUTOF10: 8

## 2019-03-06 ASSESSMENT — PAIN DESCRIPTION - PAIN TYPE: TYPE: ACUTE PAIN

## 2019-03-07 ENCOUNTER — TELEPHONE (OUTPATIENT)
Dept: FAMILY MEDICINE CLINIC | Age: 24
End: 2019-03-07

## 2019-03-08 ENCOUNTER — OFFICE VISIT (OUTPATIENT)
Dept: FAMILY MEDICINE CLINIC | Age: 24
End: 2019-03-08
Payer: MEDICARE

## 2019-03-08 VITALS
TEMPERATURE: 98.4 F | WEIGHT: 264 LBS | HEART RATE: 78 BPM | SYSTOLIC BLOOD PRESSURE: 122 MMHG | RESPIRATION RATE: 18 BRPM | DIASTOLIC BLOOD PRESSURE: 78 MMHG | BODY MASS INDEX: 46.77 KG/M2

## 2019-03-08 DIAGNOSIS — Z23 NEED FOR PNEUMOCOCCAL VACCINATION: ICD-10-CM

## 2019-03-08 DIAGNOSIS — S61.411A LACERATION OF RIGHT HAND, FOREIGN BODY PRESENCE UNSPECIFIED, INITIAL ENCOUNTER: Primary | ICD-10-CM

## 2019-03-08 PROCEDURE — 90471 IMMUNIZATION ADMIN: CPT | Performed by: NURSE PRACTITIONER

## 2019-03-08 PROCEDURE — 90732 PPSV23 VACC 2 YRS+ SUBQ/IM: CPT | Performed by: NURSE PRACTITIONER

## 2019-03-08 PROCEDURE — 99214 OFFICE O/P EST MOD 30 MIN: CPT | Performed by: NURSE PRACTITIONER

## 2019-03-08 PROCEDURE — G8417 CALC BMI ABV UP PARAM F/U: HCPCS | Performed by: NURSE PRACTITIONER

## 2019-03-08 PROCEDURE — G8427 DOCREV CUR MEDS BY ELIG CLIN: HCPCS | Performed by: NURSE PRACTITIONER

## 2019-03-08 PROCEDURE — G8482 FLU IMMUNIZE ORDER/ADMIN: HCPCS | Performed by: NURSE PRACTITIONER

## 2019-03-08 PROCEDURE — 4004F PT TOBACCO SCREEN RCVD TLK: CPT | Performed by: NURSE PRACTITIONER

## 2019-03-08 RX ORDER — HYDROCODONE BITARTRATE AND ACETAMINOPHEN 5; 325 MG/1; MG/1
1 TABLET ORAL EVERY 6 HOURS PRN
Qty: 12 TABLET | Refills: 0 | Status: SHIPPED | OUTPATIENT
Start: 2019-03-08 | End: 2019-03-11

## 2019-03-08 RX ORDER — LAMOTRIGINE 200 MG/1
TABLET ORAL
Refills: 0 | COMMUNITY
Start: 2019-02-22 | End: 2020-08-26 | Stop reason: ALTCHOICE

## 2019-03-08 ASSESSMENT — PATIENT HEALTH QUESTIONNAIRE - PHQ9
2. FEELING DOWN, DEPRESSED OR HOPELESS: 1
SUM OF ALL RESPONSES TO PHQ QUESTIONS 1-9: 1
1. LITTLE INTEREST OR PLEASURE IN DOING THINGS: 0
SUM OF ALL RESPONSES TO PHQ QUESTIONS 1-9: 1
SUM OF ALL RESPONSES TO PHQ9 QUESTIONS 1 & 2: 1

## 2019-03-11 ENCOUNTER — HOSPITAL ENCOUNTER (EMERGENCY)
Age: 24
Discharge: HOME | End: 2019-03-11
Payer: MEDICARE

## 2019-03-11 VITALS
RESPIRATION RATE: 18 BRPM | BODY MASS INDEX: 46.78 KG/M2 | TEMPERATURE: 98.1 F | DIASTOLIC BLOOD PRESSURE: 48 MMHG | OXYGEN SATURATION: 97 % | SYSTOLIC BLOOD PRESSURE: 106 MMHG | HEIGHT: 63 IN | WEIGHT: 264 LBS | HEART RATE: 105 BPM

## 2019-03-11 DIAGNOSIS — R51.9 NONINTRACTABLE HEADACHE, UNSPECIFIED CHRONICITY PATTERN, UNSPECIFIED HEADACHE TYPE: Primary | ICD-10-CM

## 2019-03-11 DIAGNOSIS — T14.8XXA BLISTER: ICD-10-CM

## 2019-03-11 DIAGNOSIS — B34.9 VIRAL ILLNESS: ICD-10-CM

## 2019-03-11 LAB
-: ABNORMAL
AMORPHOUS: ABNORMAL
BACTERIA: ABNORMAL
BILIRUBIN URINE: NEGATIVE
CASTS UA: ABNORMAL /LPF
CHP ED QC CHECK: NORMAL
COLOR: YELLOW
COMMENT UA: ABNORMAL
CRYSTALS, UA: ABNORMAL /HPF
DIRECT EXAM: NORMAL
EPITHELIAL CELLS UA: ABNORMAL /HPF (ref 0–5)
GLUCOSE URINE: NEGATIVE
KETONES, URINE: ABNORMAL
LEUKOCYTE ESTERASE, URINE: ABNORMAL
Lab: NORMAL
MUCUS: ABNORMAL
NITRITE, URINE: NEGATIVE
OTHER OBSERVATIONS UA: ABNORMAL
PH UA: 6 (ref 5–8)
PREGNANCY TEST URINE, POC: NORMAL
PROTEIN UA: NEGATIVE
RBC UA: ABNORMAL /HPF (ref 0–2)
RENAL EPITHELIAL, UA: ABNORMAL /HPF
SPECIFIC GRAVITY UA: 1.01 (ref 1–1.03)
SPECIMEN DESCRIPTION: NORMAL
TRICHOMONAS: ABNORMAL
TURBIDITY: ABNORMAL
URINE HGB: NEGATIVE
UROBILINOGEN, URINE: NORMAL
WBC UA: ABNORMAL /HPF (ref 0–5)
YEAST: ABNORMAL

## 2019-03-11 PROCEDURE — 2580000003 HC RX 258: Performed by: NURSE PRACTITIONER

## 2019-03-11 PROCEDURE — 6360000002 HC RX W HCPCS: Performed by: NURSE PRACTITIONER

## 2019-03-11 PROCEDURE — 10160 PNXR ASPIR ABSC HMTMA BULLA: CPT

## 2019-03-11 PROCEDURE — 84703 CHORIONIC GONADOTROPIN ASSAY: CPT

## 2019-03-11 PROCEDURE — 99283 EMERGENCY DEPT VISIT LOW MDM: CPT

## 2019-03-11 PROCEDURE — 81001 URINALYSIS AUTO W/SCOPE: CPT

## 2019-03-11 PROCEDURE — 96372 THER/PROPH/DIAG INJ SC/IM: CPT

## 2019-03-11 PROCEDURE — 87086 URINE CULTURE/COLONY COUNT: CPT

## 2019-03-11 PROCEDURE — 87804 INFLUENZA ASSAY W/OPTIC: CPT

## 2019-03-11 RX ORDER — BUTALBITAL, ACETAMINOPHEN AND CAFFEINE 50; 325; 40 MG/1; MG/1; MG/1
1 TABLET ORAL EVERY 4 HOURS PRN
Qty: 20 TABLET | Refills: 0 | Status: SHIPPED | OUTPATIENT
Start: 2019-03-11 | End: 2020-07-29 | Stop reason: SDUPTHER

## 2019-03-11 RX ORDER — ONDANSETRON 4 MG/1
4 TABLET, ORALLY DISINTEGRATING ORAL EVERY 8 HOURS PRN
Qty: 20 TABLET | Refills: 0 | Status: SHIPPED | OUTPATIENT
Start: 2019-03-11 | End: 2020-07-29 | Stop reason: SDUPTHER

## 2019-03-11 RX ORDER — KETOROLAC TROMETHAMINE 30 MG/ML
30 INJECTION, SOLUTION INTRAMUSCULAR; INTRAVENOUS ONCE
Status: COMPLETED | OUTPATIENT
Start: 2019-03-11 | End: 2019-03-11

## 2019-03-11 RX ORDER — 0.9 % SODIUM CHLORIDE 0.9 %
1000 INTRAVENOUS SOLUTION INTRAVENOUS ONCE
Status: COMPLETED | OUTPATIENT
Start: 2019-03-11 | End: 2019-03-11

## 2019-03-11 RX ADMIN — SODIUM CHLORIDE 1000 ML: 9 INJECTION, SOLUTION INTRAVENOUS at 15:58

## 2019-03-11 RX ADMIN — KETOROLAC TROMETHAMINE 30 MG: 30 INJECTION, SOLUTION INTRAMUSCULAR; INTRAVENOUS at 15:02

## 2019-03-11 ASSESSMENT — PAIN DESCRIPTION - LOCATION: LOCATION: HEAD

## 2019-03-11 ASSESSMENT — PAIN DESCRIPTION - DESCRIPTORS: DESCRIPTORS: POUNDING;PRESSURE

## 2019-03-11 ASSESSMENT — PAIN SCALES - GENERAL: PAINLEVEL_OUTOF10: 8

## 2019-03-12 LAB
CULTURE: NORMAL
HCG, PREGNANCY URINE (POC): NEGATIVE
Lab: NORMAL
SPECIMEN DESCRIPTION: NORMAL

## 2019-03-24 ASSESSMENT — ENCOUNTER SYMPTOMS
COUGH: 0
SHORTNESS OF BREATH: 0
NAUSEA: 0

## 2019-12-09 ENCOUNTER — HOSPITAL ENCOUNTER (OUTPATIENT)
Age: 24
Setting detail: SPECIMEN
Discharge: HOME | End: 2019-12-09
Payer: MEDICARE

## 2019-12-09 ENCOUNTER — OFFICE VISIT (OUTPATIENT)
Dept: FAMILY MEDICINE CLINIC | Age: 24
End: 2019-12-09
Payer: MEDICARE

## 2019-12-09 VITALS
WEIGHT: 277 LBS | DIASTOLIC BLOOD PRESSURE: 70 MMHG | RESPIRATION RATE: 18 BRPM | SYSTOLIC BLOOD PRESSURE: 122 MMHG | HEART RATE: 92 BPM | TEMPERATURE: 98.2 F | BODY MASS INDEX: 49.07 KG/M2

## 2019-12-09 DIAGNOSIS — R10.84 GENERALIZED ABDOMINAL PAIN: ICD-10-CM

## 2019-12-09 DIAGNOSIS — Z83.49 FAMILY HISTORY OF THYROID DISEASE: ICD-10-CM

## 2019-12-09 DIAGNOSIS — Z00.00 WELL ADULT EXAM: ICD-10-CM

## 2019-12-09 DIAGNOSIS — R19.7 DIARRHEA, UNSPECIFIED TYPE: ICD-10-CM

## 2019-12-09 DIAGNOSIS — Z83.3 FAMILY HISTORY OF DIABETES MELLITUS: ICD-10-CM

## 2019-12-09 DIAGNOSIS — E66.01 MORBID OBESITY (HCC): ICD-10-CM

## 2019-12-09 DIAGNOSIS — R42 DIZZINESS: ICD-10-CM

## 2019-12-09 DIAGNOSIS — F43.10 PTSD (POST-TRAUMATIC STRESS DISORDER): ICD-10-CM

## 2019-12-09 DIAGNOSIS — R30.0 DYSURIA: ICD-10-CM

## 2019-12-09 DIAGNOSIS — F41.9 ANXIETY: Primary | ICD-10-CM

## 2019-12-09 LAB
ALBUMIN SERPL-MCNC: 3.9 G/DL (ref 3.5–5.2)
ALBUMIN/GLOBULIN RATIO: 1.3 (ref 1–2.5)
ALP BLD-CCNC: 92 U/L (ref 35–104)
ALT SERPL-CCNC: 31 U/L (ref 5–33)
ANION GAP SERPL CALCULATED.3IONS-SCNC: 13 MMOL/L (ref 9–17)
AST SERPL-CCNC: 18 U/L
BILIRUB SERPL-MCNC: 0.27 MG/DL (ref 0.3–1.2)
BILIRUBIN, POC: ABNORMAL
BLOOD URINE, POC: ABNORMAL
BUN BLDV-MCNC: 8 MG/DL (ref 6–20)
BUN/CREAT BLD: ABNORMAL (ref 9–20)
CALCIUM SERPL-MCNC: 9.3 MG/DL (ref 8.6–10.4)
CHLORIDE BLD-SCNC: 107 MMOL/L (ref 98–107)
CHOLESTEROL/HDL RATIO: 6.2
CHOLESTEROL: 193 MG/DL
CLARITY, POC: ABNORMAL
CO2: 21 MMOL/L (ref 20–31)
COLOR, POC: ABNORMAL
CREAT SERPL-MCNC: 0.64 MG/DL (ref 0.5–0.9)
ESTIMATED AVERAGE GLUCOSE: 108 MG/DL
GFR AFRICAN AMERICAN: >60 ML/MIN
GFR NON-AFRICAN AMERICAN: >60 ML/MIN
GFR SERPL CREATININE-BSD FRML MDRD: ABNORMAL ML/MIN/{1.73_M2}
GFR SERPL CREATININE-BSD FRML MDRD: ABNORMAL ML/MIN/{1.73_M2}
GLUCOSE BLD-MCNC: 82 MG/DL (ref 70–99)
GLUCOSE URINE, POC: NEGATIVE
HBA1C MFR BLD: 5.4 % (ref 4–6)
HCT VFR BLD CALC: 44.4 % (ref 36.3–47.1)
HDLC SERPL-MCNC: 31 MG/DL
HEMOGLOBIN: 14.3 G/DL (ref 11.9–15.1)
KETONES, POC: NEGATIVE
LDL CHOLESTEROL: 129 MG/DL (ref 0–130)
LEUKOCYTE EST, POC: ABNORMAL
MCH RBC QN AUTO: 29.7 PG (ref 25.2–33.5)
MCHC RBC AUTO-ENTMCNC: 32.2 G/DL (ref 28.4–34.8)
MCV RBC AUTO: 92.3 FL (ref 82.6–102.9)
NITRITE, POC: NEGATIVE
NRBC AUTOMATED: 0 PER 100 WBC
PDW BLD-RTO: 13.6 % (ref 11.8–14.4)
PH, POC: 6
PLATELET # BLD: 199 K/UL (ref 138–453)
PMV BLD AUTO: 11.2 FL (ref 8.1–13.5)
POTASSIUM SERPL-SCNC: 4.2 MMOL/L (ref 3.7–5.3)
PROTEIN, POC: 30
RBC # BLD: 4.81 M/UL (ref 3.95–5.11)
SODIUM BLD-SCNC: 141 MMOL/L (ref 135–144)
SPECIFIC GRAVITY, POC: >=1.03
T3 FREE: 2.56 PG/ML (ref 2.02–4.43)
THYROXINE, FREE: 0.74 NG/DL (ref 0.93–1.7)
TOTAL PROTEIN: 6.8 G/DL (ref 6.4–8.3)
TRIGL SERPL-MCNC: 165 MG/DL
TSH SERPL DL<=0.05 MIU/L-ACNC: 1.17 MIU/L (ref 0.3–5)
UROBILINOGEN, POC: 0.2
VLDLC SERPL CALC-MCNC: ABNORMAL MG/DL (ref 1–30)
WBC # BLD: 9.4 K/UL (ref 3.5–11.3)

## 2019-12-09 PROCEDURE — 99214 OFFICE O/P EST MOD 30 MIN: CPT | Performed by: NURSE PRACTITIONER

## 2019-12-09 PROCEDURE — G8417 CALC BMI ABV UP PARAM F/U: HCPCS | Performed by: NURSE PRACTITIONER

## 2019-12-09 PROCEDURE — G8482 FLU IMMUNIZE ORDER/ADMIN: HCPCS | Performed by: NURSE PRACTITIONER

## 2019-12-09 PROCEDURE — 4004F PT TOBACCO SCREEN RCVD TLK: CPT | Performed by: NURSE PRACTITIONER

## 2019-12-09 PROCEDURE — G8427 DOCREV CUR MEDS BY ELIG CLIN: HCPCS | Performed by: NURSE PRACTITIONER

## 2019-12-09 RX ORDER — PROPRANOLOL HYDROCHLORIDE 10 MG/1
TABLET ORAL 2 TIMES DAILY
Refills: 0 | COMMUNITY
Start: 2019-11-22 | End: 2021-04-07

## 2019-12-09 RX ORDER — DEXTROAMPHETAMINE SACCHARATE, AMPHETAMINE ASPARTATE, DEXTROAMPHETAMINE SULFATE AND AMPHETAMINE SULFATE 2.5; 2.5; 2.5; 2.5 MG/1; MG/1; MG/1; MG/1
TABLET ORAL
Refills: 0 | COMMUNITY
Start: 2019-11-22 | End: 2020-08-26 | Stop reason: ALTCHOICE

## 2019-12-09 RX ORDER — DEXTROAMPHETAMINE SACCHARATE, AMPHETAMINE ASPARTATE MONOHYDRATE, DEXTROAMPHETAMINE SULFATE AND AMPHETAMINE SULFATE 7.5; 7.5; 7.5; 7.5 MG/1; MG/1; MG/1; MG/1
CAPSULE, EXTENDED RELEASE ORAL
Refills: 0 | COMMUNITY
Start: 2019-11-22 | End: 2020-08-26 | Stop reason: ALTCHOICE

## 2019-12-09 ASSESSMENT — ENCOUNTER SYMPTOMS
ABDOMINAL PAIN: 0
TROUBLE SWALLOWING: 0
CHEST TIGHTNESS: 0
RHINORRHEA: 0
DIARRHEA: 1
NAUSEA: 1
WHEEZING: 1
SORE THROAT: 0
VOMITING: 1
SHORTNESS OF BREATH: 1

## 2019-12-10 PROBLEM — F43.10 PTSD (POST-TRAUMATIC STRESS DISORDER): Status: ACTIVE | Noted: 2019-12-10

## 2019-12-10 LAB
CULTURE: NORMAL
Lab: NORMAL
SPECIMEN DESCRIPTION: NORMAL

## 2019-12-13 DIAGNOSIS — R79.89 ABNORMAL SERUM THYROXINE (T4) LEVEL: Primary | ICD-10-CM

## 2020-07-28 ENCOUNTER — NURSE TRIAGE (OUTPATIENT)
Dept: OTHER | Facility: CLINIC | Age: 25
End: 2020-07-28

## 2020-07-28 ENCOUNTER — TELEPHONE (OUTPATIENT)
Dept: FAMILY MEDICINE CLINIC | Age: 25
End: 2020-07-28

## 2020-07-29 ENCOUNTER — HOSPITAL ENCOUNTER (OUTPATIENT)
Age: 25
Setting detail: SPECIMEN
Discharge: HOME | End: 2020-07-29
Payer: MEDICARE

## 2020-07-29 ENCOUNTER — OFFICE VISIT (OUTPATIENT)
Dept: FAMILY MEDICINE CLINIC | Age: 25
End: 2020-07-29
Payer: MEDICARE

## 2020-07-29 ENCOUNTER — HOSPITAL ENCOUNTER (OUTPATIENT)
Dept: CT IMAGING | Age: 25
Discharge: HOME | End: 2020-07-31
Payer: MEDICARE

## 2020-07-29 VITALS
OXYGEN SATURATION: 98 % | WEIGHT: 252.6 LBS | TEMPERATURE: 96.3 F | HEART RATE: 73 BPM | DIASTOLIC BLOOD PRESSURE: 74 MMHG | BODY MASS INDEX: 44.75 KG/M2 | SYSTOLIC BLOOD PRESSURE: 104 MMHG | RESPIRATION RATE: 16 BRPM

## 2020-07-29 PROBLEM — F81.9 DEVELOPMENTAL DISORDER OF SCHOLASTIC SKILL: Status: ACTIVE | Noted: 2020-07-29

## 2020-07-29 PROBLEM — F98.8 ATTENTION DEFICIT DISORDER WITHOUT HYPERACTIVITY: Status: ACTIVE | Noted: 2020-07-29

## 2020-07-29 PROBLEM — N80.9 ENDOMETRIOSIS: Status: ACTIVE | Noted: 2020-07-29

## 2020-07-29 PROBLEM — K21.9 ESOPHAGEAL REFLUX: Status: ACTIVE | Noted: 2020-07-29

## 2020-07-29 PROBLEM — N94.89 PELVIC CONGESTION: Status: ACTIVE | Noted: 2020-07-29

## 2020-07-29 PROBLEM — D69.1 PLATELET DENSE GRANULE DEFICIENCY (HCC): Status: ACTIVE | Noted: 2018-10-11

## 2020-07-29 PROBLEM — D69.1 PLATELET FUNCTION DEFECT (HCC): Status: ACTIVE | Noted: 2020-07-29

## 2020-07-29 PROBLEM — E66.01 MORBID OBESITY (HCC): Status: ACTIVE | Noted: 2020-07-29

## 2020-07-29 PROBLEM — N92.0 EXCESSIVE AND FREQUENT MENSTRUATION: Status: ACTIVE | Noted: 2018-07-18

## 2020-07-29 PROBLEM — Z03.89 OBSERVATION OF OTHER SUSPECTED MENTAL CONDITION: Status: ACTIVE | Noted: 2020-07-29

## 2020-07-29 LAB
-: ABNORMAL
ALBUMIN SERPL-MCNC: 4.2 G/DL (ref 3.5–5.2)
ALBUMIN/GLOBULIN RATIO: 1.8 (ref 1–2.5)
ALP BLD-CCNC: 75 U/L (ref 35–104)
ALT SERPL-CCNC: 33 U/L (ref 5–33)
AMORPHOUS: ABNORMAL
ANION GAP SERPL CALCULATED.3IONS-SCNC: 14 MMOL/L (ref 9–17)
AST SERPL-CCNC: 18 U/L
BACTERIA: ABNORMAL
BILIRUB SERPL-MCNC: 0.29 MG/DL (ref 0.3–1.2)
BILIRUBIN URINE: NEGATIVE
BUN BLDV-MCNC: 12 MG/DL (ref 6–20)
BUN/CREAT BLD: ABNORMAL (ref 9–20)
CALCIUM SERPL-MCNC: 9.4 MG/DL (ref 8.6–10.4)
CASTS UA: ABNORMAL /LPF (ref 0–8)
CHLORIDE BLD-SCNC: 101 MMOL/L (ref 98–107)
CHOLESTEROL/HDL RATIO: 6.6
CHOLESTEROL: 197 MG/DL
CO2: 23 MMOL/L (ref 20–31)
COLOR: YELLOW
COMMENT UA: ABNORMAL
CREAT SERPL-MCNC: 0.58 MG/DL (ref 0.5–0.9)
CRYSTALS, UA: ABNORMAL /HPF
EPITHELIAL CELLS UA: ABNORMAL /HPF (ref 0–5)
ESTIMATED AVERAGE GLUCOSE: 105 MG/DL
FERRITIN: 86 UG/L (ref 13–150)
FOLATE: 4.7 NG/ML
GFR AFRICAN AMERICAN: >60 ML/MIN
GFR NON-AFRICAN AMERICAN: >60 ML/MIN
GFR SERPL CREATININE-BSD FRML MDRD: ABNORMAL ML/MIN/{1.73_M2}
GFR SERPL CREATININE-BSD FRML MDRD: ABNORMAL ML/MIN/{1.73_M2}
GLUCOSE FASTING: 89 MG/DL (ref 70–99)
GLUCOSE URINE: NEGATIVE
HBA1C MFR BLD: 5.3 % (ref 4–6)
HCT VFR BLD CALC: 47.9 % (ref 36.3–47.1)
HDLC SERPL-MCNC: 30 MG/DL
HEMOGLOBIN: 15 G/DL (ref 11.9–15.1)
INSULIN COMMENT: NORMAL
INSULIN REFERENCE RANGE:: NORMAL
INSULIN: 20.4 MU/L
IRON SATURATION: 23 % (ref 20–55)
IRON: 71 UG/DL (ref 37–145)
KETONES, URINE: NEGATIVE
LDL CHOLESTEROL: 127 MG/DL (ref 0–130)
LEUKOCYTE ESTERASE, URINE: ABNORMAL
MCH RBC QN AUTO: 30.5 PG (ref 25.2–33.5)
MCHC RBC AUTO-ENTMCNC: 31.3 G/DL (ref 28.4–34.8)
MCV RBC AUTO: 97.6 FL (ref 82.6–102.9)
MUCUS: ABNORMAL
NITRITE, URINE: NEGATIVE
NRBC AUTOMATED: 0 PER 100 WBC
OTHER OBSERVATIONS UA: ABNORMAL
PDW BLD-RTO: 13.5 % (ref 11.8–14.4)
PH UA: 5.5 (ref 5–8)
PLATELET # BLD: 175 K/UL (ref 138–453)
PMV BLD AUTO: 11.9 FL (ref 8.1–13.5)
POTASSIUM SERPL-SCNC: 4.2 MMOL/L (ref 3.7–5.3)
PROTEIN UA: ABNORMAL
RBC # BLD: 4.91 M/UL (ref 3.95–5.11)
RBC UA: ABNORMAL /HPF (ref 0–4)
RENAL EPITHELIAL, UA: ABNORMAL /HPF
SODIUM BLD-SCNC: 138 MMOL/L (ref 135–144)
SPECIFIC GRAVITY UA: 1.02 (ref 1–1.03)
TOTAL IRON BINDING CAPACITY: 313 UG/DL (ref 250–450)
TOTAL PROTEIN: 6.6 G/DL (ref 6.4–8.3)
TRICHOMONAS: ABNORMAL
TRIGL SERPL-MCNC: 198 MG/DL
TSH SERPL DL<=0.05 MIU/L-ACNC: 1.86 MIU/L (ref 0.3–5)
TURBIDITY: ABNORMAL
UNSATURATED IRON BINDING CAPACITY: 242 UG/DL (ref 112–347)
URINE HGB: NEGATIVE
UROBILINOGEN, URINE: NORMAL
VITAMIN B-12: 290 PG/ML (ref 232–1245)
VITAMIN D 25-HYDROXY: 22.9 NG/ML (ref 30–100)
VLDLC SERPL CALC-MCNC: ABNORMAL MG/DL (ref 1–30)
WBC # BLD: 10.5 K/UL (ref 3.5–11.3)
WBC UA: ABNORMAL /HPF (ref 0–5)
YEAST: ABNORMAL

## 2020-07-29 PROCEDURE — 6360000004 HC RX CONTRAST MEDICATION: Performed by: NURSE PRACTITIONER

## 2020-07-29 PROCEDURE — G8417 CALC BMI ABV UP PARAM F/U: HCPCS | Performed by: NURSE PRACTITIONER

## 2020-07-29 PROCEDURE — 4004F PT TOBACCO SCREEN RCVD TLK: CPT | Performed by: NURSE PRACTITIONER

## 2020-07-29 PROCEDURE — 99215 OFFICE O/P EST HI 40 MIN: CPT | Performed by: NURSE PRACTITIONER

## 2020-07-29 PROCEDURE — 74177 CT ABD & PELVIS W/CONTRAST: CPT

## 2020-07-29 PROCEDURE — 81003 URINALYSIS AUTO W/O SCOPE: CPT | Performed by: NURSE PRACTITIONER

## 2020-07-29 PROCEDURE — G8427 DOCREV CUR MEDS BY ELIG CLIN: HCPCS | Performed by: NURSE PRACTITIONER

## 2020-07-29 PROCEDURE — 2580000003 HC RX 258: Performed by: NURSE PRACTITIONER

## 2020-07-29 RX ORDER — SODIUM CHLORIDE 0.9 % (FLUSH) 0.9 %
10 SYRINGE (ML) INJECTION PRN
Status: DISCONTINUED | OUTPATIENT
Start: 2020-07-29 | End: 2020-08-01 | Stop reason: HOSPADM

## 2020-07-29 RX ORDER — BUTALBITAL, ACETAMINOPHEN AND CAFFEINE 50; 325; 40 MG/1; MG/1; MG/1
1 TABLET ORAL EVERY 4 HOURS PRN
Qty: 20 TABLET | Refills: 0 | Status: SHIPPED | OUTPATIENT
Start: 2020-07-29 | End: 2020-08-27 | Stop reason: SDUPTHER

## 2020-07-29 RX ORDER — ALBUTEROL SULFATE 90 UG/1
2 AEROSOL, METERED RESPIRATORY (INHALATION) EVERY 6 HOURS PRN
COMMUNITY
Start: 2020-02-13 | End: 2022-09-21

## 2020-07-29 RX ORDER — ONDANSETRON 4 MG/1
4 TABLET, ORALLY DISINTEGRATING ORAL EVERY 8 HOURS PRN
Qty: 20 TABLET | Refills: 0 | Status: SHIPPED | OUTPATIENT
Start: 2020-07-29 | End: 2020-09-14 | Stop reason: SDUPTHER

## 2020-07-29 RX ORDER — 0.9 % SODIUM CHLORIDE 0.9 %
80 INTRAVENOUS SOLUTION INTRAVENOUS ONCE
Status: COMPLETED | OUTPATIENT
Start: 2020-07-29 | End: 2020-07-29

## 2020-07-29 RX ADMIN — IOVERSOL 100 ML: 741 INJECTION INTRA-ARTERIAL; INTRAVENOUS at 10:15

## 2020-07-29 RX ADMIN — SODIUM CHLORIDE 80 ML: 9 INJECTION, SOLUTION INTRAVENOUS at 10:15

## 2020-07-29 RX ADMIN — Medication 10 ML: at 10:15

## 2020-07-29 ASSESSMENT — ENCOUNTER SYMPTOMS
ABDOMINAL PAIN: 1
CONSTIPATION: 1
CHEST TIGHTNESS: 0
NAUSEA: 1
BACK PAIN: 0
COUGH: 0
RHINORRHEA: 0
SHORTNESS OF BREATH: 0
DIARRHEA: 0
ABDOMINAL DISTENTION: 1
BLOOD IN STOOL: 0
SORE THROAT: 0
VOMITING: 1
WHEEZING: 0
TROUBLE SWALLOWING: 0
SINUS PRESSURE: 0

## 2020-07-29 ASSESSMENT — PATIENT HEALTH QUESTIONNAIRE - PHQ9
1. LITTLE INTEREST OR PLEASURE IN DOING THINGS: 1
2. FEELING DOWN, DEPRESSED OR HOPELESS: 1
SUM OF ALL RESPONSES TO PHQ QUESTIONS 1-9: 2
SUM OF ALL RESPONSES TO PHQ9 QUESTIONS 1 & 2: 2
SUM OF ALL RESPONSES TO PHQ QUESTIONS 1-9: 2

## 2020-07-29 NOTE — PROGRESS NOTES
[address] [facility]   [address]  [phone]    7/29/2020    Visit Information    Have you changed or started any medications since your last visit including any over-the-counter medicines, vitamins, or herbal medicines? yes - Med list updated   Are you having any side effects from any of your medications? -  Unknown  Have you stopped taking any of your medications? Is so, why? -  yes - Stopped taking most medication due to starting emsam patch    Have you seen any other physician or provider since your last visit? Yes - Records Obtained Urgent care, [address]  Have you had any other diagnostic tests since your last visit? No  Have you been seen in the emergency room and/or had an admission to a hospital since we last saw you? No  Have you had your routine dental cleaning in the past 6 months? yes - 1/20    Have you activated your [employer] account? If not, what are your barriers? Yes     Patient Care Team:  GIO Grossman CNP as PCP - General (Nurse Practitioner Family)  GIO Jimenez CNP as PCP - [facility]      Kristi Yao is a 30 y.o. female who presents today for her  medical conditions/complaints as noted below. Kristi Yao is c/o of Emesis (Onset 1 year intermittent. Still feeling worse after vomiting. Worsening in the last 1-2 weeks.); Medication Check (Treating with THC. No fevers. ); and Other (Family history of thyroid disorder and DM)  . HPI:     Patient is a very pleasant 66-year-old female who presents to the office today along with her mother. Patient states she takes her medications as prescribed. She denies any chest pain, shortness of breath, recent upper respiratory infections or fever. Mother appears to be significantly more worried about patient, as opposed to patient. Patient does live alone. She does have very close to her mother if anything is needed.   It is noted that her brother has after she smokes marijuana. It is discussed with both patient and her mother at visit this is could easily be cyclic vomiting syndrome due to marijuana. We will do additional testing, blood work, and imaging to rule out any other diagnoses. Patient also suffers from migraine headaches. The propranolol that was prescribed, she is told to not take daily as a prophylactic but only take as needed for migraine relief.       Care team includes:  Dr. 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[address]     Depression     Obesity     Seizures ([facility] [address]       Past Surgical History:   Procedure Laterality Date    CHOLECYSTECTOMY, LAPAROSCOPIC  16    ENDOMETRIAL ABLATION  2018    EYE SURGERY Left     cyst removed    HERNIA REPAIR      INTRAUTERINE DEVICE INSERTION      removed 2015    TONSILLECTOMY AND ADENOIDECTOMY      TUBAL LIGATION  2018    WISDOM TOOTH EXTRACTION       Family History   Problem Relation Age of Onset    Diabetes Maternal Grandmother     Heart Disease Maternal Grandmother      Social History     Tobacco Use    Smoking status: Current Some Day Smoker     Packs/day: 1.00     Years: 5.00     Pack years: 5.00     Types: Cigarettes     Last attempt to quit: 2016     Years since quittin.4    Smokeless tobacco: Never Used   Substance Use Topics    Alcohol use: No     Alcohol/week: 0.0 standard drinks     Comment: 2016-stopped drinking      Current Outpatient Medications   Medication Sig Dispense Refill    albuterol sulfate  (90 Base) MCG/ACT inhaler Inhale 2 puffs into the lungs every 6 hours as tightness, shortness of breath and wheezing. Cardiovascular: Positive for palpitations and leg swelling. Negative for chest pain. Gastrointestinal: Positive for abdominal distention, abdominal pain, constipation, nausea and vomiting (coffee grounds at times ). Negative for blood in stool and diarrhea. Genitourinary: Positive for frequency. Negative for decreased urine volume, difficulty urinating, dysuria and hematuria. Musculoskeletal: Negative for arthralgias, back pain, gait problem and myalgias. Arthritic pain bilateral hands, knees, right hip    Skin: Negative for rash. Neurological: Positive for syncope (once), light-headedness (on occasion, position changes ) and headaches (upon awakening). Negative for dizziness, weakness and numbness. Hematological: Bruises/bleeds easily. Psychiatric/Behavioral: Positive for agitation, decreased concentration, self-injury (cutting, in past), sleep disturbance and suicidal ideas (past). The patient is nervous/anxious. Objective:     Physical Exam  Vitals signs and nursing note reviewed. Constitutional:       General: She is not in acute distress. Appearance: Normal appearance. She is well-developed and well-groomed. She is morbidly obese. She is ill-appearing. She is not toxic-appearing. HENT:      Head: Normocephalic. Right Ear: Hearing normal.      Left Ear: Hearing normal.      Mouth/Throat:      Lips: Pink. Mouth: Mucous membranes are moist.      Pharynx: Oropharynx is clear. No oropharyngeal exudate or posterior oropharyngeal erythema. Comments: Leigh Brianne score +4  Eyes:      General: Lids are normal.      Extraocular Movements: Extraocular movements intact. Right eye: No nystagmus. Left eye: No nystagmus. Conjunctiva/sclera: Conjunctivae normal.      Pupils: Pupils are equal, round, and reactive to light. Neck:      Musculoskeletal: No neck rigidity or muscular tenderness.       Thyroid: No thyroid mass or thyroid tenderness. Cardiovascular:      Rate and Rhythm: Normal rate and regular rhythm. Pulses: Normal pulses. Carotid pulses are 2+ on the right side and 2+ on the left side. Radial pulses are 2+ on the right side and 2+ on the left side. Dorsalis pedis pulses are 2+ on the right side and 2+ on the left side. Heart sounds: S1 normal and S2 normal. Heart sounds are distant. No murmur. Pulmonary:      Effort: Pulmonary effort is normal. No respiratory distress. Breath sounds: Normal breath sounds and air entry. No wheezing or rales. Abdominal:      General: Abdomen is protuberant. Bowel sounds are decreased. There is distension. Palpations: Abdomen is soft. Tenderness: There is abdominal tenderness in the suprapubic area. There is no guarding or rebound. Musculoskeletal: Normal range of motion. General: No swelling or tenderness. Right lower le+ Edema present. Left lower le+ Edema present. Lymphadenopathy:      Cervical: No cervical adenopathy. Skin:     General: Skin is warm and dry. Capillary Refill: Capillary refill takes less than 2 seconds. Findings: No bruising, erythema or rash. Neurological:      General: No focal deficit present. Mental Status: She is alert and oriented to person, place, and time. Psychiatric:         Attention and Perception: Attention and perception normal.         Mood and Affect: Mood normal. Affect is labile and flat. Speech: Speech normal.         Behavior: Behavior is slowed. Behavior is cooperative. Thought Content: Thought content normal.         Cognition and Memory: Cognition and memory normal.         Judgment: Judgment normal.          Assessment:      1. Lower abdominal pain  -Worsening   - CBC; Future  - Comprehensive Metabolic Panel, Fasting; Future  - Urinalysis;  Future  - External Referral To Gastroenterology  - CT ABDOMEN PELVIS W IV CONTRAST Additional Contrast? Radiologist Recommendation; Future    2. Coffee ground emesis  -Worsening   - Ferritin; Future  - Iron and TIBC; Future  - External Referral To Gastroenterology  - ondansetron (ZOFRAN ODT) 4 MG disintegrating tablet; Take 1 tablet by mouth every 8 hours as needed for Nausea  Dispense: 20 tablet; Refill: 0  - CT ABDOMEN PELVIS W IV CONTRAST Additional Contrast? Radiologist Recommendation; Future    3. Platelet dense granule deficiency ([facility]    4. Fatigue, unspecified type  -Worsening   - TSH with Reflex; Future  - Thyroid Peroxidase Antibody; Future  - Urinalysis; Future  - Vitamin B12 & Folate; Future  - Vitamin D 25 Hydroxy; Future  - Ferritin; Future  - Iron and TIBC; Future    5. Migraine without aura and without status migrainosus, not intractable  -Stable, medicated, monitered   - butalbital-acetaminophen-caffeine (FIORICET, ESGIC) -40 MG per tablet; Take 1 tablet by mouth every 4 hours as needed for Headaches  Dispense: 20 tablet; Refill: 0    6. Anxiety  - [address] [place] [address]    7. PTSD (post-traumatic stress disorder)  - [address] [place] [address]    8. Attention deficit disorder without hyperactivity  - [address] [place] [address]    9. Vitamin D insufficiency  - Vitamin D 25 Hydroxy; Future    10. Morbid obesity ([facility]    11. Screening for cardiovascular condition  - Lipid Panel; Future  - Urinalysis; Future    12. Screening for diabetes mellitus (DM)  - Hemoglobin A1C; Future  - Insulin, total; Future       Plan:      Return in about 4 weeks (around 8/26/2020).   Orders Placed This Encounter   Procedures    CT ABDOMEN PELVIS W IV CONTRAST Additional Contrast? Radiologist Recommendation     Standing Status:   Future     Number of Occurrences:   1     Standing Expiration Date:   7/29/2021     Order Specific Question:   Additional Contrast?     Answer:   Radiologist Recommendation     Order Specific Question:   Reason for exam:     Answer:   abdominal pain    CBC     Standing Status:   Future     Number of Occurrences:   1     Standing Expiration Date:   7/29/2021    Comprehensive Metabolic Panel, Fasting     Standing Status:   Future     Number of Occurrences:   1     Standing Expiration Date:   7/29/2021    Hemoglobin A1C     Standing Status:   Future     Number of Occurrences:   1     Standing Expiration Date:   7/29/2021    Insulin, total     Standing Status:   Future     Number of Occurrences:   1     Standing Expiration Date:   7/29/2021    Lipid Panel     Standing Status:   Future     Number of Occurrences:   1     Standing Expiration Date:   7/29/2021     Order Specific Question:   Is Patient Fasting?/# of Hours     Answer:   yes    TSH with Reflex     Standing Status:   Future     Number of Occurrences:   1     Standing Expiration Date:   7/29/2021    Thyroid Peroxidase Antibody     Standing Status:   Future     Number of Occurrences:   1     Standing Expiration Date:   7/29/2021    Urinalysis     Standing Status:   Future     Number of Occurrences:   1     Standing Expiration Date:   7/29/2021    Vitamin B12 & Folate     Standing Status:   Future     Number of Occurrences:   1     Standing Expiration Date:   7/29/2021    Vitamin D 25 Hydroxy     Standing Status:   Future     Number of Occurrences:   1     Standing Expiration Date:   7/29/2021    Ferritin     Standing Status:   Future     Number of Occurrences:   1     Standing Expiration Date:   7/29/2021    Iron and TIBC     Standing Status:   Future     Number of Occurrences:   1     Standing Expiration Date:   7/29/2021     Order Specific Question:   Is Patient Fasting? Answer:   yes     Order Specific Question:   No of Hours?      Answer:   15    External Referral To Gastroenterology     Referral Priority:   Urgent     Referral Type:   Consult for Advice and Opinion     Referral Reason:   Specialty Services Required     Referred to Provider:   Toshia Mcdonnell MD     Requested Specialty:   Gastroenterology Number of Visits Requested:   [address]     Referral Priority:   Routine     Referral Type:   Eval and Treat     Referral Reason:   Specialty Services Required     Requested Specialty:   Psychology     Number of Visits Requested:   1     Orders Placed This Encounter   Medications    ondansetron (ZOFRAN ODT) 4 MG disintegrating tablet     Sig: Take 1 tablet by mouth every 8 hours as needed for Nausea     Dispense:  20 tablet     Refill:  0    butalbital-acetaminophen-caffeine (FIORICET, ESGIC) -40 MG per tablet     Sig: Take 1 tablet by mouth every 4 hours as needed for Headaches     Dispense:  20 tablet     Refill:  0       Reviewed health maintenance, prior labs and imaging. Patient given educational materials - see patient instructions. Discussed use, benefit, and side effects of prescribed medications. Barriers to medication compliance addressed. All patient questions answered. Pt voiced understanding to plan of care. Instructed to continue medications as discussed, healthy diet and exercise. Patient agreed with treatment plan. Follow up as directed below. Communication:      Items for Patient/Writer/Staff to Accomplish  Follow up in office if symptoms worsen or persist.  Follow up to be scheduled as discussed. Please ensure referral/consult completed. Quality Measures  BMI Readings from Last 1 Encounters:   07/29/20 44.75 kg/m²        Lab Results   Component Value Date    LABA1C 5.4 12/09/2019       BP Readings from Last 3 Encounters:   07/29/20 104/74   12/09/19 122/70   03/11/19 (!) 106/48        The ASCVD Risk score (Luis E Castro, et al., 2013) failed to calculate for the following reasons:     The 2013 ASCVD risk score is only valid for ages 36 to 78     PHQ Scores 7/29/2020 3/8/2019 10/7/2016   PHQ2 Score 2 1 1   PHQ9 Score 2 1 1     Interpretation of Total Score Depression Severity: 1-4 = Minimal depression, 5-9 = Mild depression, 10-14 = Moderate

## 2020-07-30 ENCOUNTER — TELEPHONE (OUTPATIENT)
Dept: FAMILY MEDICINE CLINIC | Age: 25
End: 2020-07-30

## 2020-07-30 LAB — THYROID PEROXIDASE (TPO) AB: <10 IU/ML (ref 0–35)

## 2020-07-30 RX ORDER — ERGOCALCIFEROL 1.25 MG/1
50000 CAPSULE ORAL WEEKLY
Qty: 12 CAPSULE | Refills: 1 | Status: SHIPPED | OUTPATIENT
Start: 2020-07-30 | End: 2020-10-29 | Stop reason: SDUPTHER

## 2020-07-30 RX ORDER — NITROFURANTOIN 25; 75 MG/1; MG/1
100 CAPSULE ORAL 2 TIMES DAILY
Qty: 14 CAPSULE | Refills: 0 | Status: SHIPPED | OUTPATIENT
Start: 2020-07-30 | End: 2020-08-06

## 2020-08-26 ENCOUNTER — OFFICE VISIT (OUTPATIENT)
Dept: FAMILY MEDICINE CLINIC | Age: 25
End: 2020-08-26
Payer: MEDICARE

## 2020-08-26 PROCEDURE — 99214 OFFICE O/P EST MOD 30 MIN: CPT | Performed by: NURSE PRACTITIONER

## 2020-08-26 PROCEDURE — G8417 CALC BMI ABV UP PARAM F/U: HCPCS | Performed by: NURSE PRACTITIONER

## 2020-08-26 PROCEDURE — G8427 DOCREV CUR MEDS BY ELIG CLIN: HCPCS | Performed by: NURSE PRACTITIONER

## 2020-08-26 PROCEDURE — 4004F PT TOBACCO SCREEN RCVD TLK: CPT | Performed by: NURSE PRACTITIONER

## 2020-08-26 NOTE — PROGRESS NOTES
[address] [facility]   [address]  [phone]    8/27/2020    Visit Information    Have you changed or started any medications since your last visit including any over-the-counter medicines, vitamins, or herbal medicines? yes - Med list updated   Are you having any side effects from any of your medications? -  no  Have you stopped taking any of your medications? Is so, why? -  yes - Med list updated    Have you seen any other physician or provider since your last visit? Yes - [address] [facility], Clinical psychologist- Telehealth  Have you had any other diagnostic tests since your last visit? Yes - Records Obtained CT Abd/Pelvis, Blood work   Have you been seen in the emergency room and/or had an admission to a hospital since we last saw you? No  Have you had your routine dental cleaning in the past 6 months? No    Have you activated your [employer] account? If not, what are your barriers? Yes     Patient Care Team:  GIO Ray CNP as PCP - General (Nurse Practitioner Family)  GIO Ray CNP as PCP - [facility]      Tom Pena is a 30 y.o. female who presents today for her  medical conditions/complaints as noted below. Tom Pena is c/o of Results (Mother in office for patient today. ) and Anxiety (Has not tried propanolol today. )  . HPI:     Patient is a 75-year-old female. Her mother is at the office today for patient's a follow-up visit. They would like to discuss the results of her CT of her abdomen. Mother is here, and [facility], the patient, is on FaceTime therefore present. She had a significant anxiety attack this morning and refused to leave her home. Mother is adamant to keep her appointment as she strives to be patient's advocate. It was discussed that this is unorthodox, however we can treat it is a virtual visit. Discussed with patient and mother in detail CT of her abdomen.   Overall it is confirming her pelvic congestion which she is aware already. Patient is ready had a uterine ablation. In addition she has had her tubes tied. Patient encouraged to reach out to her established gynecologist to decipher if a hysterectomy is appropriate for her at this time. Anxiety: Patient complains of evaluation of anxiety disorder, panic attacks and sleep disturbance. She has the following anxiety symptoms: difficulty concentrating, fatigue, insomnia, irritable. Onset of symptoms was approximately several months ago, gradually worsening since that time. She denies current suicidal and homicidal ideation. Possible organic causes contributing are: drug abuse, endocrine/metabolic. Risk factors: previous episode of depression Previous treatment includes Emsam patches and individual therapy and medication. She complains of the following side effects from the treatment: none.       Vitals:      Past Medical History:   Diagnosis Date    Anemia     Anxiety     Bipolar disorder ([facility] [facility]     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[facility]     Depression     Obesity     Seizures ([facility] [facility]       Past Surgical History:   Procedure Laterality Date    CHOLECYSTECTOMY, LAPAROSCOPIC  16    ENDOMETRIAL ABLATION  2018    EYE SURGERY Left     cyst removed    HERNIA REPAIR      INTRAUTERINE DEVICE INSERTION      removed 2015    TONSILLECTOMY AND ADENOIDECTOMY      TUBAL LIGATION  2018    WISDOM TOOTH EXTRACTION       Family History   Problem Relation Age of Onset    Diabetes Maternal Grandmother     Heart Disease Maternal Grandmother      Social History     Tobacco Use    Smoking status: Current Some Day Smoker     Packs/day: 1.00     Years: 5.00     Pack years: 5.00     Types: Cigarettes     Last attempt to quit: 2016     Years since quittin.5    Smokeless tobacco: Never Used   Substance Use Topics    Alcohol use: No     Alcohol/week: 0.0 standard drinks     Comment: 2016-stopped drinking      Current ). The patient is nervous/anxious. Objective:     Physical Exam  Constitutional:       General: She is not in acute distress. Appearance: Normal appearance. She is well-developed and well-groomed. She is not ill-appearing or toxic-appearing. HENT:      Head: Normocephalic. Right Ear: Hearing normal.      Left Ear: Hearing normal.      Mouth/Throat:      Lips: Pink. Eyes:      General: Lids are normal.   Pulmonary:      Effort: Pulmonary effort is normal. No respiratory distress. Skin:     Coloration: Skin is not cyanotic, jaundiced or pale. Neurological:      Mental Status: She is alert and oriented to person, place, and time. Psychiatric:         Attention and Perception: Attention and perception normal.         Mood and Affect: Mood normal. Affect is flat. Speech: Speech normal.         Behavior: Behavior is cooperative. Thought Content: Thought content normal. Thought content does not include suicidal ideation. Thought content does not include suicidal plan. Cognition and Memory: Cognition and memory normal.         Judgment: Judgment normal.          Assessment:      1. Migraine without aura and without status migrainosus, not intractable  Refilled   - butalbital-acetaminophen-caffeine (FIORICET, ESGIC) -40 MG per tablet; Take 1 tablet by mouth every 4 hours as needed for Headaches  Dispense: 20 tablet; Refill: 0    2. Anxiety  Mother is to make an appointment with behavioral health prior to leaving office today  In addition she is to make a follow-up appointment with me in 3 month    3. Pelvic congestion  Recommended to contact gynecologist to discuss surgical interventions    4. Platelet dense granule deficiency ([facility] [address]       Plan:      Return in about 3 months (around 11/26/2020). No orders of the defined types were placed in this encounter.     Orders Placed This Encounter   Medications    butalbital-acetaminophen-caffeine (FIORICET, [address]) -39

## 2020-08-27 RX ORDER — BUTALBITAL, ACETAMINOPHEN AND CAFFEINE 50; 325; 40 MG/1; MG/1; MG/1
1 TABLET ORAL EVERY 4 HOURS PRN
Qty: 20 TABLET | Refills: 0 | Status: SHIPPED | OUTPATIENT
Start: 2020-08-27 | End: 2020-09-22 | Stop reason: SDUPTHER

## 2020-08-27 ASSESSMENT — ENCOUNTER SYMPTOMS
ABDOMINAL PAIN: 0
VOMITING: 1
CHEST TIGHTNESS: 0
NAUSEA: 1
ABDOMINAL DISTENTION: 0
DIARRHEA: 0
BLOOD IN STOOL: 0
SHORTNESS OF BREATH: 0
COUGH: 0

## 2020-09-15 RX ORDER — ONDANSETRON 4 MG/1
4 TABLET, ORALLY DISINTEGRATING ORAL EVERY 8 HOURS PRN
Qty: 20 TABLET | Refills: 0 | Status: SHIPPED | OUTPATIENT
Start: 2020-09-15 | End: 2020-10-29 | Stop reason: SDUPTHER

## 2020-09-23 RX ORDER — BUTALBITAL, ACETAMINOPHEN AND CAFFEINE 50; 325; 40 MG/1; MG/1; MG/1
1 TABLET ORAL EVERY 4 HOURS PRN
Qty: 20 TABLET | Refills: 0 | Status: SHIPPED | OUTPATIENT
Start: 2020-09-23 | End: 2020-10-29 | Stop reason: SDUPTHER

## 2020-09-23 NOTE — TELEPHONE ENCOUNTER
LOV 8/26/20  RTO 3 months; F/U scheduled  LRF 8/27/20    Health Maintenance   Topic Date Due    Cervical cancer screen  09/20/2014    Flu vaccine (1) 09/01/2020    DTaP/Tdap/Td vaccine (2 - Td) 03/06/2029    Pneumococcal 0-64 years Vaccine  Completed    HIV screen  Completed    Hepatitis A vaccine  Aged Out    Hepatitis B vaccine  Aged Out    Hib vaccine  Aged Out    Meningococcal (ACWY) vaccine  Aged Out    Varicella vaccine  Discontinued             (applicable per patient's age: Cancer Screenings, Depression Screening, Fall Risk Screening, Immunizations)    Hemoglobin A1C (%)   Date Value   07/29/2020 5.3   12/09/2019 5.4   01/17/2014 5.6     LDL Cholesterol (mg/dL)   Date Value   07/29/2020 127     AST (U/L)   Date Value   07/29/2020 18     ALT (U/L)   Date Value   07/29/2020 33     BUN (mg/dL)   Date Value   07/29/2020 12      (goal A1C is < 7)   (goal LDL is <100) need 30-50% reduction from baseline     BP Readings from Last 3 Encounters:   07/29/20 104/74   12/09/19 122/70   03/11/19 (!) 106/48    (goal /80)      All Future Testing planned in [facility]:      Next Visit Date:  Future Appointments   Date Time Provider Mario Bullard   11/25/2020  2:30 PM Karolina Castellanos, APRN - [facility] [place] [facility]            Patient Active Problem List:     Migraine headache     Vaginal bleeding     Anxiety     Vitamin D insufficiency     PTSD (post-traumatic stress disorder)     Excessive and frequent menstruation     Adult body mass index 40 and over     Attention deficit disorder without hyperactivity     Calculus of gallbladder with chronic cholecystitis without obstruction     Developmental disorder of scholastic skill     Endometriosis     Esophageal reflux     Observation of other suspected mental condition     Pelvic congestion     Platelet dense granule deficiency ([address]     Platelet function defect ([address]     Morbid obesity ([address]

## 2020-10-30 RX ORDER — ERGOCALCIFEROL 1.25 MG/1
50000 CAPSULE ORAL WEEKLY
Qty: 12 CAPSULE | Refills: 1 | Status: SHIPPED | OUTPATIENT
Start: 2020-10-30 | End: 2021-03-09 | Stop reason: SDUPTHER

## 2020-10-30 RX ORDER — BUTALBITAL, ACETAMINOPHEN AND CAFFEINE 50; 325; 40 MG/1; MG/1; MG/1
1 TABLET ORAL EVERY 4 HOURS PRN
Qty: 20 TABLET | Refills: 0 | Status: SHIPPED | OUTPATIENT
Start: 2020-10-30 | End: 2020-11-24 | Stop reason: SDUPTHER

## 2020-10-30 RX ORDER — ONDANSETRON 4 MG/1
4 TABLET, ORALLY DISINTEGRATING ORAL EVERY 8 HOURS PRN
Qty: 20 TABLET | Refills: 0 | Status: SHIPPED | OUTPATIENT
Start: 2020-10-30 | End: 2020-11-24 | Stop reason: SDUPTHER

## 2020-10-30 NOTE — TELEPHONE ENCOUNTER
Last visit: 8/26/20  Last Med refill: 9/23/20  Does patient have enough medication for 72 hours: No:     Next Visit Date:  Future Appointments   Date Time Provider Mario Bullard   11/25/2020  2:30 PM GIO Connolly CNP [facility] [address] Maintenance   Topic Date Due    Cervical cancer screen  09/20/2014    Flu vaccine (1) 09/01/2020    DTaP/Tdap/Td vaccine (2 - Td) 03/06/2029    Pneumococcal 0-64 years Vaccine  Completed    HIV screen  Completed    Hepatitis A vaccine  Aged Out    Hepatitis B vaccine  Aged Out    Hib vaccine  Aged Out    Meningococcal (ACWY) vaccine  Aged Out    Varicella vaccine  Discontinued       Hemoglobin A1C (%)   Date Value   07/29/2020 5.3   12/09/2019 5.4   01/17/2014 5.6             ( goal A1C is < 7)   No results found for: LABMICR  LDL Cholesterol (mg/dL)   Date Value   07/29/2020 127   12/09/2019 129       (goal LDL is <100)   AST (U/L)   Date Value   07/29/2020 18     ALT (U/L)   Date Value   07/29/2020 33     BUN (mg/dL)   Date Value   07/29/2020 12     BP Readings from Last 3 Encounters:   07/29/20 104/74   12/09/19 122/70   03/11/19 (!) 106/48          (goal 120/80)    All Future Testing planned in [facility]              Patient Active Problem List:     Migraine headache     Vaginal bleeding     Anxiety     Vitamin D insufficiency     PTSD (post-traumatic stress disorder)     Excessive and frequent menstruation     Adult body mass index 40 and over     Attention deficit disorder without hyperactivity     Calculus of gallbladder with chronic cholecystitis without obstruction     Developmental disorder of scholastic skill     Endometriosis     Esophageal reflux     Observation of other suspected mental condition     Pelvic congestion     Platelet dense granule deficiency ([address]     Platelet function defect ([address]     Morbid obesity ([address]

## 2020-11-24 RX ORDER — ERGOCALCIFEROL 1.25 MG/1
50000 CAPSULE ORAL WEEKLY
Qty: 12 CAPSULE | Refills: 1 | Status: CANCELLED | OUTPATIENT
Start: 2020-11-24

## 2020-11-24 RX ORDER — BUTALBITAL, ACETAMINOPHEN AND CAFFEINE 50; 325; 40 MG/1; MG/1; MG/1
1 TABLET ORAL EVERY 4 HOURS PRN
Qty: 20 TABLET | Refills: 0 | Status: SHIPPED | OUTPATIENT
Start: 2020-11-24 | End: 2020-12-31 | Stop reason: SDUPTHER

## 2020-11-24 RX ORDER — ONDANSETRON 4 MG/1
4 TABLET, ORALLY DISINTEGRATING ORAL EVERY 8 HOURS PRN
Qty: 20 TABLET | Refills: 0 | Status: SHIPPED | OUTPATIENT
Start: 2020-11-24 | End: 2020-12-31 | Stop reason: SDUPTHER

## 2020-11-24 NOTE — TELEPHONE ENCOUNTER
Last visit: 8/26/2020  Last Med refill:10/30/20  Does patient have enough medication for 72 hours: Yes    Next Visit Date:  Future Appointments   Date Time Provider Mario Bullard   12/15/2020 11:30 AM Karolina Jones, GIO - CNP [facility] [address] Maintenance   Topic Date Due    Cervical cancer screen  09/20/2014    Flu vaccine (1) 09/01/2020    DTaP/Tdap/Td vaccine (2 - Td) 03/06/2029    Pneumococcal 0-64 years Vaccine  Completed    HIV screen  Completed    Hepatitis A vaccine  Aged Out    Hepatitis B vaccine  Aged Out    Hib vaccine  Aged Out    Meningococcal (ACWY) vaccine  Aged Out    Varicella vaccine  Discontinued       Hemoglobin A1C (%)   Date Value   07/29/2020 5.3   12/09/2019 5.4   01/17/2014 5.6             ( goal A1C is < 7)   No results found for: LABMICR  LDL Cholesterol (mg/dL)   Date Value   07/29/2020 127   12/09/2019 129       (goal LDL is <100)   AST (U/L)   Date Value   07/29/2020 18     ALT (U/L)   Date Value   07/29/2020 33     BUN (mg/dL)   Date Value   07/29/2020 12     BP Readings from Last 3 Encounters:   07/29/20 104/74   12/09/19 122/70   03/11/19 (!) 106/48          (goal 120/80)    All Future Testing planned in [facility]              Patient Active Problem List:     Migraine headache     Vaginal bleeding     Anxiety     Vitamin D insufficiency     PTSD (post-traumatic stress disorder)     Excessive and frequent menstruation     Adult body mass index 40 and over     Attention deficit disorder without hyperactivity     Calculus of gallbladder with chronic cholecystitis without obstruction     Developmental disorder of scholastic skill     Endometriosis     Esophageal reflux     Observation of other suspected mental condition     Pelvic congestion     Platelet dense granule deficiency ([address]     Platelet function defect ([address]     Morbid obesity ([address]

## 2020-12-31 DIAGNOSIS — K92.0 COFFEE GROUND EMESIS: ICD-10-CM

## 2020-12-31 DIAGNOSIS — G43.009 MIGRAINE WITHOUT AURA AND WITHOUT STATUS MIGRAINOSUS, NOT INTRACTABLE: ICD-10-CM

## 2021-01-04 RX ORDER — ONDANSETRON 4 MG/1
4 TABLET, ORALLY DISINTEGRATING ORAL EVERY 8 HOURS PRN
Qty: 20 TABLET | Refills: 0 | Status: SHIPPED | OUTPATIENT
Start: 2021-01-04 | End: 2021-03-09 | Stop reason: SDUPTHER

## 2021-01-04 RX ORDER — BUTALBITAL, ACETAMINOPHEN AND CAFFEINE 50; 325; 40 MG/1; MG/1; MG/1
1 TABLET ORAL EVERY 4 HOURS PRN
Qty: 20 TABLET | Refills: 0 | Status: SHIPPED | OUTPATIENT
Start: 2021-01-04 | End: 2021-03-09 | Stop reason: SDUPTHER

## 2021-01-04 NOTE — TELEPHONE ENCOUNTER
LOV 8/26/20  RTO 3 months; F/U scheduled  LRF 11/24/20    Health Maintenance   Topic Date Due    Hepatitis C screen  1995    Cervical cancer screen  09/20/2014    Flu vaccine (1) 09/01/2020    DTaP/Tdap/Td vaccine (2 - Td) 03/06/2029    Pneumococcal 0-64 years Vaccine  Completed    HIV screen  Completed    Hepatitis A vaccine  Aged Out    Hepatitis B vaccine  Aged Out    Hib vaccine  Aged Out    Meningococcal (ACWY) vaccine  Aged Out    Varicella vaccine  Discontinued             (applicable per patient's age: Cancer Screenings, Depression Screening, Fall Risk Screening, Immunizations)    Hemoglobin A1C (%)   Date Value   07/29/2020 5.3   12/09/2019 5.4   01/17/2014 5.6     LDL Cholesterol (mg/dL)   Date Value   07/29/2020 127     AST (U/L)   Date Value   07/29/2020 18     ALT (U/L)   Date Value   07/29/2020 33     BUN (mg/dL)   Date Value   07/29/2020 12      (goal A1C is < 7)   (goal LDL is <100) need 30-50% reduction from baseline     BP Readings from Last 3 Encounters:   07/29/20 104/74   12/09/19 122/70   03/11/19 (!) 106/48    (goal /80)      All Future Testing planned in [facility]:      Next Visit Date:  Future Appointments   Date Time Provider Mario Bullard   1/11/2021  1:00 PM Karolina Wooten, APRN - CNP [facility]            Patient Active Problem List:     Migraine headache     Vaginal bleeding     Anxiety     Vitamin D insufficiency     PTSD (post-traumatic stress disorder)     Excessive and frequent menstruation     Adult body mass index 40 and over     Attention deficit disorder without hyperactivity     Calculus of gallbladder with chronic cholecystitis without obstruction     Developmental disorder of scholastic skill     Endometriosis     Esophageal reflux     Observation of other suspected mental condition     Pelvic congestion     Platelet dense granule deficiency ([address]     Platelet function defect ([address]     Morbid obesity ([address]

## 2021-03-02 ENCOUNTER — TELEPHONE (OUTPATIENT)
Dept: FAMILY MEDICINE CLINIC | Age: 26
End: 2021-03-02

## 2021-03-02 NOTE — TELEPHONE ENCOUNTER
[facility] Trans[facility] Initial Follow Up Call    Outreach made within 2 business days of discharge: Yes    Patient: Kerri Zhu Patient : 1995   MRN: [id number]  Reason for Admission:   Discharge Date:        Spoke with: Left voicemail 3/2/21    Discharge department/facility: [facility]    TCM Interactive Patient Contact:  Was patient able to fill all prescriptions: NA  Was patient instructed to bring all medications to the follow-up visit: NA  Is patient taking all medications as directed in the discharge summary? NA  Does patient understand their discharge instructions: NA  Does patient have questions or concerns that need addressed prior to 7-14 day follow up office visit: NA    Scheduled appointment with PCP within 7-14 days    Follow Up  No future appointments.     Wes Willingham LPN

## 2021-03-05 ENCOUNTER — TELEPHONE (OUTPATIENT)
Dept: FAMILY MEDICINE CLINIC | Age: 26
End: 2021-03-05

## 2021-03-09 ENCOUNTER — PATIENT MESSAGE (OUTPATIENT)
Dept: FAMILY MEDICINE CLINIC | Age: 26
End: 2021-03-09

## 2021-03-09 DIAGNOSIS — K92.0 COFFEE GROUND EMESIS: ICD-10-CM

## 2021-03-09 DIAGNOSIS — G43.009 MIGRAINE WITHOUT AURA AND WITHOUT STATUS MIGRAINOSUS, NOT INTRACTABLE: ICD-10-CM

## 2021-03-09 DIAGNOSIS — E55.9 VITAMIN D INSUFFICIENCY: ICD-10-CM

## 2021-03-09 NOTE — RESULT ENCOUNTER NOTE
Noted. Completed and treated while in ED. Available if needed for correlation of care.  Needs follow up appointment

## 2021-03-10 RX ORDER — ONDANSETRON 4 MG/1
4 TABLET, ORALLY DISINTEGRATING ORAL EVERY 8 HOURS PRN
Qty: 20 TABLET | Refills: 0 | Status: SHIPPED | OUTPATIENT
Start: 2021-03-10 | End: 2021-04-06 | Stop reason: SDUPTHER

## 2021-03-10 RX ORDER — ERGOCALCIFEROL 1.25 MG/1
50000 CAPSULE ORAL WEEKLY
Qty: 12 CAPSULE | Refills: 1 | Status: SHIPPED | OUTPATIENT
Start: 2021-03-10 | End: 2021-05-07 | Stop reason: SDUPTHER

## 2021-03-10 RX ORDER — BUTALBITAL, ACETAMINOPHEN AND CAFFEINE 50; 325; 40 MG/1; MG/1; MG/1
1 TABLET ORAL EVERY 4 HOURS PRN
Qty: 20 TABLET | Refills: 0 | Status: SHIPPED | OUTPATIENT
Start: 2021-03-10 | End: 2021-04-06 | Stop reason: SDUPTHER

## 2021-03-10 NOTE — TELEPHONE ENCOUNTER
LOV 8/26/20  RTO 3 months; Overdue for F/U given to scheduling  LRF 1/04/21 and 10/30/20    Health Maintenance   Topic Date Due    Hepatitis C screen  Never done    Cervical cancer screen  Never done    Flu vaccine (1) 09/01/2020    DTaP/Tdap/Td vaccine (2 - Td) 03/06/2029    Pneumococcal 0-64 years Vaccine  Completed    HIV screen  Completed    Hepatitis A vaccine  Aged Out    Hepatitis B vaccine  Aged Out    Hib vaccine  Aged Out    Meningococcal (ACWY) vaccine  Aged Out    Varicella vaccine  Discontinued             (applicable per patient's age: Cancer Screenings, Depression Screening, Fall Risk Screening, Immunizations)    Hemoglobin A1C (%)   Date Value   07/29/2020 5.3   12/09/2019 5.4   01/17/2014 5.6     LDL Cholesterol (mg/dL)   Date Value   07/29/2020 127     AST (U/L)   Date Value   07/29/2020 18     ALT (U/L)   Date Value   07/29/2020 33     BUN (mg/dL)   Date Value   07/29/2020 12      (goal A1C is < 7)   (goal LDL is <100) need 30-50% reduction from baseline     BP Readings from Last 3 Encounters:   07/29/20 104/74   12/09/19 122/70   03/11/19 (!) 106/48    (goal /80)      All Future Testing planned in [facility]:      Next Visit Date:  No future appointments.          Patient Active Problem List:     Migraine headache     Vaginal bleeding     Anxiety     Vitamin D insufficiency     PTSD (post-traumatic stress disorder)     Excessive and frequent menstruation     Adult body mass index 40 and over     Attention deficit disorder without hyperactivity     Calculus of gallbladder with chronic cholecystitis without obstruction     Developmental disorder of scholastic skill     Endometriosis     Esophageal reflux     Observation of other suspected mental condition     Pelvic congestion     Platelet dense granule deficiency ([address]     Platelet function defect ([address]     Morbid obesity ([address]

## 2021-04-06 DIAGNOSIS — K92.0 COFFEE GROUND EMESIS: ICD-10-CM

## 2021-04-06 DIAGNOSIS — G43.009 MIGRAINE WITHOUT AURA AND WITHOUT STATUS MIGRAINOSUS, NOT INTRACTABLE: ICD-10-CM

## 2021-04-07 ENCOUNTER — OFFICE VISIT (OUTPATIENT)
Dept: FAMILY MEDICINE CLINIC | Age: 26
End: 2021-04-07
Payer: MEDICARE

## 2021-04-07 VITALS
RESPIRATION RATE: 18 BRPM | TEMPERATURE: 98.9 F | WEIGHT: 206.8 LBS | SYSTOLIC BLOOD PRESSURE: 94 MMHG | HEIGHT: 63 IN | DIASTOLIC BLOOD PRESSURE: 64 MMHG | OXYGEN SATURATION: 97 % | BODY MASS INDEX: 36.64 KG/M2 | HEART RATE: 84 BPM

## 2021-04-07 DIAGNOSIS — G89.29 CHRONIC PAIN OF RIGHT KNEE: ICD-10-CM

## 2021-04-07 DIAGNOSIS — R63.4 WEIGHT LOSS, UNINTENTIONAL: ICD-10-CM

## 2021-04-07 DIAGNOSIS — J45.21 MILD INTERMITTENT REACTIVE AIRWAY DISEASE WITH ACUTE EXACERBATION: ICD-10-CM

## 2021-04-07 DIAGNOSIS — F43.10 PTSD (POST-TRAUMATIC STRESS DISORDER): ICD-10-CM

## 2021-04-07 DIAGNOSIS — D69.1 PLATELET DENSE GRANULE DEFICIENCY (HCC): ICD-10-CM

## 2021-04-07 DIAGNOSIS — F33.2 SEVERE EPISODE OF RECURRENT MAJOR DEPRESSIVE DISORDER, WITHOUT PSYCHOTIC FEATURES (HCC): Primary | ICD-10-CM

## 2021-04-07 DIAGNOSIS — F41.9 ANXIETY: ICD-10-CM

## 2021-04-07 DIAGNOSIS — K92.1 BLOOD IN STOOL: ICD-10-CM

## 2021-04-07 DIAGNOSIS — N94.89 PELVIC CONGESTION: ICD-10-CM

## 2021-04-07 DIAGNOSIS — E55.9 VITAMIN D INSUFFICIENCY: ICD-10-CM

## 2021-04-07 DIAGNOSIS — M25.561 CHRONIC PAIN OF RIGHT KNEE: ICD-10-CM

## 2021-04-07 PROCEDURE — 1111F DSCHRG MED/CURRENT MED MERGE: CPT | Performed by: NURSE PRACTITIONER

## 2021-04-07 PROCEDURE — 99214 OFFICE O/P EST MOD 30 MIN: CPT | Performed by: NURSE PRACTITIONER

## 2021-04-07 RX ORDER — BUTALBITAL, ACETAMINOPHEN AND CAFFEINE 50; 325; 40 MG/1; MG/1; MG/1
1 TABLET ORAL EVERY 4 HOURS PRN
Qty: 20 TABLET | Refills: 0 | Status: SHIPPED | OUTPATIENT
Start: 2021-04-07 | End: 2021-05-07 | Stop reason: SDUPTHER

## 2021-04-07 RX ORDER — ONDANSETRON 4 MG/1
4 TABLET, ORALLY DISINTEGRATING ORAL EVERY 8 HOURS PRN
Qty: 20 TABLET | Refills: 0 | Status: SHIPPED | OUTPATIENT
Start: 2021-04-07 | End: 2021-05-07 | Stop reason: SDUPTHER

## 2021-04-07 SDOH — ECONOMIC STABILITY: TRANSPORTATION INSECURITY
IN THE PAST 12 MONTHS, HAS LACK OF TRANSPORTATION KEPT YOU FROM MEETINGS, WORK, OR FROM GETTING THINGS NEEDED FOR DAILY LIVING?: YES

## 2021-04-07 SDOH — ECONOMIC STABILITY: TRANSPORTATION INSECURITY
IN THE PAST 12 MONTHS, HAS THE LACK OF TRANSPORTATION KEPT YOU FROM MEDICAL APPOINTMENTS OR FROM GETTING MEDICATIONS?: YES

## 2021-04-07 SDOH — ECONOMIC STABILITY: FOOD INSECURITY: WITHIN THE PAST 12 MONTHS, THE FOOD YOU BOUGHT JUST DIDN'T LAST AND YOU DIDN'T HAVE MONEY TO GET MORE.: NEVER TRUE

## 2021-04-07 ASSESSMENT — PATIENT HEALTH QUESTIONNAIRE - PHQ9
SUM OF ALL RESPONSES TO PHQ QUESTIONS 1-9: 19
1. LITTLE INTEREST OR PLEASURE IN DOING THINGS: 2
3. TROUBLE FALLING OR STAYING ASLEEP: 3
2. FEELING DOWN, DEPRESSED OR HOPELESS: 1
10. IF YOU CHECKED OFF ANY PROBLEMS, HOW DIFFICULT HAVE THESE PROBLEMS MADE IT FOR YOU TO DO YOUR WORK, TAKE CARE OF THINGS AT HOME, OR GET ALONG WITH OTHER PEOPLE: 1
8. MOVING OR SPEAKING SO SLOWLY THAT OTHER PEOPLE COULD HAVE NOTICED. OR THE OPPOSITE, BEING SO FIGETY OR RESTLESS THAT YOU HAVE BEEN MOVING AROUND A LOT MORE THAN USUAL: 3
9. THOUGHTS THAT YOU WOULD BE BETTER OFF DEAD, OR OF HURTING YOURSELF: 0
7. TROUBLE CONCENTRATING ON THINGS, SUCH AS READING THE NEWSPAPER OR WATCHING TELEVISION: 3

## 2021-04-07 ASSESSMENT — ENCOUNTER SYMPTOMS
COUGH: 0
SINUS PRESSURE: 0
ABDOMINAL PAIN: 1
BACK PAIN: 0
CONSTIPATION: 0
SHORTNESS OF BREATH: 0
BLOOD IN STOOL: 1
TROUBLE SWALLOWING: 0
RHINORRHEA: 0
WHEEZING: 0
SORE THROAT: 0
CHEST TIGHTNESS: 0

## 2021-04-07 ASSESSMENT — COLUMBIA-SUICIDE SEVERITY RATING SCALE - C-SSRS: 1. WITHIN THE PAST MONTH, HAVE YOU WISHED YOU WERE DEAD OR WISHED YOU COULD GO TO SLEEP AND NOT WAKE UP?: NO
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
## 2021-04-08 LAB
ALBUMIN SERPL-MCNC: 4.2 G/DL
ALP BLD-CCNC: 69 U/L
ALT SERPL-CCNC: 24 U/L
AST SERPL-CCNC: 20 U/L
AVERAGE GLUCOSE: 103
BASOPHILS ABSOLUTE: 0.1 /ΜL
BASOPHILS RELATIVE PERCENT: 1.2 %
BILIRUB SERPL-MCNC: 0.5 MG/DL (ref 0.1–1.4)
BILIRUBIN URINE: 0 MG/DL
BLOOD, URINE: NEGATIVE
BUN BLDV-MCNC: 12 MG/DL
C-REACTIVE PROTEIN: 0.5
CALCIUM SERPL-MCNC: 9.7 MG/DL
CHLORIDE BLD-SCNC: 106 MMOL/L
CHOLESTEROL, TOTAL: 234 MG/DL
CHOLESTEROL/HDL RATIO: 6.2
CLARITY: CLEAR
CO2: 24 MMOL/L
COLOR: YELLOW
CREAT SERPL-MCNC: 0.65 MG/DL
EOSINOPHILS ABSOLUTE: 0.6 /ΜL
EOSINOPHILS RELATIVE PERCENT: 6.5 %
FERRITIN: 60 NG/ML (ref 9–150)
FOLATE: 7.6
GLUCOSE FASTING: 73 MG/DL
GLUCOSE URINE: NEGATIVE
HBA1C MFR BLD: 5.2 %
HCT VFR BLD CALC: 46 % (ref 36–46)
HDLC SERPL-MCNC: 38 MG/DL (ref 35–70)
HEMOGLOBIN: 15.1 G/DL (ref 12–16)
HIV AG/AB: NORMAL
IRON: 121
KETONES, URINE: NEGATIVE
LDL CHOLESTEROL CALCULATED: 172 MG/DL (ref 0–160)
LEUKOCYTE ESTERASE, URINE: POSITIVE
LYMPHOCYTES ABSOLUTE: 2.5 /ΜL
LYMPHOCYTES RELATIVE PERCENT: 25.3 %
MCH RBC QN AUTO: 30.8 PG
MCHC RBC AUTO-ENTMCNC: 32.7 G/DL
MCV RBC AUTO: 94 FL
MONOCYTES ABSOLUTE: 0.6 /ΜL
MONOCYTES RELATIVE PERCENT: 6.4 %
NEUTROPHILS ABSOLUTE: 5.9 /ΜL
NEUTROPHILS RELATIVE PERCENT: 60.6 %
NITRITE, URINE: NEGATIVE
NONHDLC SERPL-MCNC: ABNORMAL MG/DL
PDW BLD-RTO: ABNORMAL %
PH UA: 6 (ref 4.5–8)
PLATELET # BLD: 178 K/ΜL
PMV BLD AUTO: 9.5 FL
POTASSIUM SERPL-SCNC: 3.8 MMOL/L
PROTEIN UA: POSITIVE
RBC # BLD: 4.89 10^6/ΜL
RETIC: 2
SEDIMENTATION RATE, ERYTHROCYTE: 20
SODIUM BLD-SCNC: 141 MMOL/L
SPECIFIC GRAVITY UA: 1.03 (ref 1–1.03)
TOTAL IRON BINDING CAPACITY: 392
TOTAL PROTEIN: 6.8 G/DL (ref 6.4–8.2)
TRIGL SERPL-MCNC: 122 MG/DL
UROBILINOGEN, URINE: NORMAL
VITAMIN B-12: 203
VITAMIN D 25-HYDROXY: 16.7
VITAMIN D2, 25 HYDROXY: ABNORMAL
VITAMIN D3,25 HYDROXY: ABNORMAL
VLDLC SERPL CALC-MCNC: 24 MG/DL
WBC # BLD: 9.7 10^3/ML

## 2021-04-08 ASSESSMENT — VISUAL ACUITY: OU: 1

## 2021-04-08 ASSESSMENT — ENCOUNTER SYMPTOMS
NAUSEA: 1
ABDOMINAL DISTENTION: 1
DIARRHEA: 1

## 2021-04-09 DIAGNOSIS — E55.9 VITAMIN D INSUFFICIENCY: ICD-10-CM

## 2021-04-09 DIAGNOSIS — R63.4 WEIGHT LOSS, UNINTENTIONAL: ICD-10-CM

## 2021-04-09 DIAGNOSIS — K92.1 BLOOD IN STOOL: ICD-10-CM

## 2021-04-10 DIAGNOSIS — R80.9 PROTEINURIA, UNSPECIFIED TYPE: Primary | ICD-10-CM

## 2021-04-13 DIAGNOSIS — K92.1 BLOOD IN STOOL: ICD-10-CM

## 2021-04-13 DIAGNOSIS — R63.4 WEIGHT LOSS, UNINTENTIONAL: ICD-10-CM

## 2021-04-22 ENCOUNTER — HOSPITAL ENCOUNTER (OUTPATIENT)
Dept: ULTRASOUND IMAGING | Facility: CLINIC | Age: 26
Discharge: HOME | End: 2021-04-24
Payer: MEDICARE

## 2021-04-22 DIAGNOSIS — R80.9 PROTEINURIA, UNSPECIFIED TYPE: ICD-10-CM

## 2021-04-22 PROCEDURE — 76770 US EXAM ABDO BACK WALL COMP: CPT

## 2021-04-23 ENCOUNTER — PATIENT MESSAGE (OUTPATIENT)
Dept: FAMILY MEDICINE CLINIC | Age: 26
End: 2021-04-23

## 2021-04-23 DIAGNOSIS — K92.0 HEMATEMESIS, PRESENCE OF NAUSEA NOT SPECIFIED: Primary | ICD-10-CM

## 2021-04-23 NOTE — TELEPHONE ENCOUNTER
From: Randee Posadas  To: Maria Buck, GIO - CNP  Sent: 4/23/2021 10:53 AM EDT  Subject: Non-Urgent Medical Question    I am still throwing up. Sometimes there is blood in it. My mom would like to see if you'd consider endoscopy. . to rule out an ulcer or something. .   She is still very concerned about the weightloss.

## 2021-05-07 DIAGNOSIS — K92.0 COFFEE GROUND EMESIS: ICD-10-CM

## 2021-05-07 DIAGNOSIS — G43.009 MIGRAINE WITHOUT AURA AND WITHOUT STATUS MIGRAINOSUS, NOT INTRACTABLE: ICD-10-CM

## 2021-05-07 DIAGNOSIS — E55.9 VITAMIN D INSUFFICIENCY: ICD-10-CM

## 2021-05-07 RX ORDER — BUTALBITAL, ACETAMINOPHEN AND CAFFEINE 50; 325; 40 MG/1; MG/1; MG/1
1 TABLET ORAL EVERY 4 HOURS PRN
Qty: 20 TABLET | Refills: 0 | Status: SHIPPED | OUTPATIENT
Start: 2021-05-07 | End: 2021-05-30 | Stop reason: SDUPTHER

## 2021-05-07 RX ORDER — ONDANSETRON 4 MG/1
4 TABLET, ORALLY DISINTEGRATING ORAL EVERY 8 HOURS PRN
Qty: 20 TABLET | Refills: 0 | Status: SHIPPED | OUTPATIENT
Start: 2021-05-07 | End: 2021-05-30 | Stop reason: SDUPTHER

## 2021-05-07 RX ORDER — ERGOCALCIFEROL 1.25 MG/1
50000 CAPSULE ORAL WEEKLY
Qty: 12 CAPSULE | Refills: 1 | Status: SHIPPED | OUTPATIENT
Start: 2021-05-07 | End: 2021-06-20 | Stop reason: SDUPTHER

## 2021-05-07 NOTE — TELEPHONE ENCOUNTER
Last visit: 8/26/20  Last Med refill: 4/7/20    3/10/21    Next Visit Date:  Future Appointments   Date Time Provider Mario Bullard   5/10/2021  2:00 PM GIO Dennis - [facility] [address] Maintenance   Topic Date Due    Hepatitis C screen  Never done    COVID-19 Vaccine (1) Never done    Cervical cancer screen  Never done    Flu vaccine (Season Ended) 09/01/2021    DTaP/Tdap/Td vaccine (2 - Td) 03/06/2029    Pneumococcal 0-64 years Vaccine  Completed    HIV screen  Completed    Hepatitis A vaccine  Aged Out    Hepatitis B vaccine  Aged Out    Hib vaccine  Aged Out    Meningococcal (ACWY) vaccine  Aged Out    Varicella vaccine  Discontinued       Hemoglobin A1C (%)   Date Value   04/08/2021 5.2   07/29/2020 5.3   12/09/2019 5.4             ( goal A1C is < 7)   No results found for: LABMICR  LDL Cholesterol (mg/dL)   Date Value   07/29/2020 127   12/09/2019 129     LDL Calculated (mg/dL)   Date Value   04/08/2021 172 (H)       (goal LDL is <100)   AST (U/L)   Date Value   04/08/2021 20     ALT (U/L)   Date Value   04/08/2021 24     BUN (mg/dL)   Date Value   04/08/2021 12     BP Readings from Last 3 Encounters:   04/07/21 94/64   07/29/20 104/74   12/09/19 122/70          (goal 120/80)    All Future Testing planned in [facility]  Lab Frequency Next Occurrence   XR KNEE RIGHT (1-2 VIEWS) Once 04/08/2022   Lactate Dehydrogenase Once 04/08/2022   Calprotectin Stool Once 04/08/2022   Clostridium Difficile Toxin/Antigen Once 04/08/2022   Gastrointestinal Panel, Molecular Once 04/08/2022   Fecal lactoferrin Once 04/08/2022   Blood Occult Stool #1 Once 04/08/2022   H.  Pylori Antigen, EIA Once 04/08/2022   Genesight Psychotropic (combinatorial pharmacogenomics test) Once 04/08/2022               Patient Active Problem List:     Migraine headache     Vaginal bleeding     Anxiety     Vitamin D insufficiency     PTSD (post-traumatic stress disorder)     Excessive and frequent menstruation Adult body mass index 40 and over     Attention deficit disorder without hyperactivity     Calculus of gallbladder with chronic cholecystitis without obstruction     Developmental disorder of scholastic skill     Endometriosis     Esophageal reflux     Observation of other suspected mental condition     Pelvic congestion     Platelet dense granule deficiency ([address]     Platelet function defect ([address]     Morbid obesity ([address]

## 2021-05-19 DIAGNOSIS — M25.562 PAIN IN BOTH KNEES, UNSPECIFIED CHRONICITY: Primary | ICD-10-CM

## 2021-05-19 DIAGNOSIS — M25.561 PAIN IN BOTH KNEES, UNSPECIFIED CHRONICITY: Primary | ICD-10-CM

## 2021-05-20 ENCOUNTER — OFFICE VISIT (OUTPATIENT)
Dept: ORTHOPEDIC SURGERY | Age: 26
End: 2021-05-20
Payer: MEDICARE

## 2021-05-20 VITALS
BODY MASS INDEX: 34.2 KG/M2 | SYSTOLIC BLOOD PRESSURE: 103 MMHG | HEIGHT: 63 IN | DIASTOLIC BLOOD PRESSURE: 79 MMHG | HEART RATE: 61 BPM | RESPIRATION RATE: 11 BRPM | WEIGHT: 193 LBS

## 2021-05-20 DIAGNOSIS — S83.004A DISLOCATION OF RIGHT PATELLA, INITIAL ENCOUNTER: Primary | ICD-10-CM

## 2021-05-20 DIAGNOSIS — M22.01 RECURRENT DISLOCATION OF PATELLA, RIGHT: ICD-10-CM

## 2021-05-20 DIAGNOSIS — M22.2X2 PATELLOFEMORAL SYNDROME OF LEFT KNEE: ICD-10-CM

## 2021-05-20 PROCEDURE — G8427 DOCREV CUR MEDS BY ELIG CLIN: HCPCS | Performed by: ORTHOPAEDIC SURGERY

## 2021-05-20 PROCEDURE — 99203 OFFICE O/P NEW LOW 30 MIN: CPT | Performed by: ORTHOPAEDIC SURGERY

## 2021-05-20 PROCEDURE — 4004F PT TOBACCO SCREEN RCVD TLK: CPT | Performed by: ORTHOPAEDIC SURGERY

## 2021-05-20 PROCEDURE — G8417 CALC BMI ABV UP PARAM F/U: HCPCS | Performed by: ORTHOPAEDIC SURGERY

## 2021-05-20 ASSESSMENT — ENCOUNTER SYMPTOMS
COUGH: 0
ABDOMINAL PAIN: 0
APNEA: 0
ABDOMINAL DISTENTION: 0
SHORTNESS OF BREATH: 0
NAUSEA: 0
COLOR CHANGE: 0
VOMITING: 0
CHEST TIGHTNESS: 0
CONSTIPATION: 0
DIARRHEA: 0

## 2021-05-20 NOTE — PROGRESS NOTES
[facility] [address] AND [facility]TS MEDICINE  [address] [place] [id number]  Dept: [phone]  Dept Fax: [phone]                                                            Bilateral Knee - New Patient     Subjective:     Chief Complaint   Patient presents with    New Patient     Bilateral Knee Pain R>L, x2 years     HPI:     Sydney Bullock presents today with bilateral knee pain. The patient works at [employer]. The pain has been present for 2 years. The right knee hurts worse than the left. The patient has had 3 dislocations with the right knee. The patient has tried ES Tylenol, Motrin at a prescription dose, Naproxen, ice/heat, and copper compression sleeves with mild improvement. The pain is now described as Clide Mainor, Liset Horne and Green Moder. There is pain with weight bearing. The knees have swelled. There is painful popping and clicking. The knee has caught or locked up. The knees have given out. It is stiff upon arising from sitting. It is painful to go up and down stairs and sit for a prolonged time. Going down the stairs is worse than going up. The patient has not had a cortisone injection. The patient has not tried a lubrication injection. The patient has not tried physical therapy. The patient has not had surgery. The patient states that she will fall down her stairs because of her knees. The patient has lost 100 pounds. ROS:   Review of Systems   Constitutional: Positive for activity change. Negative for appetite change, fatigue and fever. Respiratory: Negative for apnea, cough, chest tightness and shortness of breath. Cardiovascular: Negative for chest pain, palpitations and leg swelling. Gastrointestinal: Negative for abdominal distention, abdominal pain, constipation, diarrhea, nausea and vomiting. Genitourinary: Negative for difficulty urinating, dysuria and hematuria.    Musculoskeletal: Positive for arthralgias and gait problem. Negative for joint swelling and myalgias. Skin: Negative for color change and rash. Neurological: Negative for dizziness, weakness, numbness and headaches. Psychiatric/Behavioral: Negative for sleep disturbance. Past Medical History:    Past Medical History:   Diagnosis Date    Anemia     Anxiety     [facility]     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[facility] [facility]     Depression     Obesity     Seizures ([facility] [address] [facility]        Past Surgical History:    Past Surgical History:   Procedure Laterality Date    CHOLECYSTECTOMY, LAPAROSCOPIC  5-20-16    ENDOMETRIAL ABLATION  2018    EYE SURGERY Left     cyst removed    HERNIA REPAIR      INTRAUTERINE DEVICE INSERTION      removed July 16, 2015    TONSILLECTOMY AND ADENOIDECTOMY      TUBAL LIGATION  2018    WISDOM TOOTH EXTRACTION         CurrentMedications:   Current Outpatient Medications   Medication Sig Dispense Refill    vitamin D (ERGOCALCIFEROL) 1.25 MG (93034 UT) CAPS capsule Take 1 capsule by mouth once a week 12 capsule 1    ondansetron (ZOFRAN ODT) 4 MG disintegrating tablet Take 1 tablet by mouth every 8 hours as needed for Nausea 20 tablet 0    butalbital-acetaminophen-caffeine (FIORICET, ESGIC) -40 MG per tablet Take 1 tablet by mouth every 4 hours as needed for Headaches 20 tablet 0    albuterol sulfate  (90 Base) MCG/ACT inhaler Inhale 2 puffs into the lungs every 6 hours as needed       No current facility-administered medications for this visit.        Allergies:    Latex and Sulfa antibiotics    Social History:   Social History     Socioeconomic History    Marital status: Single     Spouse name: None    Number of children: None    Years of education: None    Highest education level: None   Occupational History    None   Tobacco Use    Smoking status: Current Some Day Smoker     Packs/day: 1.00     Years: 5.00     Pack years: 5.00     Types: Cigarettes     Last attempt to quit: 2/12/2016     Years since quittin.2    Smokeless tobacco: Never Used   Substance and Sexual Activity    Alcohol use: No     Alcohol/week: 0.0 standard drinks     Comment: 2016-stopped drinking    Drug use: Yes     Frequency: 7.0 times per week     Types: Marijuana     Comment: 2016-daily marijuana use    Sexual activity: Yes     Partners: Male     Birth control/protection: Condom   Other Topics Concern    None   Social History Narrative    None     Social Determinants of Health     Financial Resource Strain: Low Risk     Difficulty of Paying Living Expenses: Not hard at all   Food Insecurity: No Food Insecurity    Worried About Running Out of Food in the Last Year: Never true    Jass of Food in the Last Year: Never true   Transportation Needs: Unmet Transportation Needs    Lack of Transportation (Medical): Yes    Lack of Transportation (Non-Medical): Yes   Physical Activity:     Days of Exercise per Week:     Minutes of Exercise per Session:    Stress:     Feeling of Stress :    Social Connections:     Frequency of Communication with Friends and Family:     Frequency of Social Gatherings with Friends and Family:     Attends Alevism Services:     Active Member of Clubs or Organizations:     Attends Club or Organization Meetings:     Marital Status:    Intimate Partner Violence:     Fear of Current or Ex-Partner:     Emotionally Abused:     Physically Abused:     Sexually Abused:        Family History:  Family History   Problem Relation Age of Onset    Diabetes Maternal Grandmother     Heart Disease Maternal Grandmother        Vitals:   /79   Pulse 61   Resp 11   Ht 5' 3\" (1.6 m)   Wt 193 lb (87.5 kg)   BMI 34.19 kg/m²  Body mass index is 34.19 kg/m². Physical Examination:     Orthopedics:    GENERAL: Alert and oriented X3 in no acute distress. SKIN: Intact without lesions or ulcerations. NEURO: Intact to sensory and motor testing. VASC: Capillary refill is less than 3 seconds.     KNEE bodies. No bony erosion or periosteal reaction. No soft tissue masses.    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 XR KNEE RIGHT (MIN 4 VIEWS)    Result Date: 5/21/2021    KNEE X-RAY   4 views of the bilateral knees including AP, bilateral tunnel, and lateral in the upright position, and skyline views reveal lateral patellar subluxation, patella andreas and trochlear dysplasia alignment with no fracture or dislocation. Kellgren grade 2 changes of osteoarthritis (joint space narrowing, osteophyte, subchondral sclerosis, bony deformity/cyst) of the tri  compartment(s). No osseous loose bodies. No bony erosion or periosteal reaction. No soft tissue masses.    Impression:  Lateral patellar subluxation, patella alto and trochlear dysplasia of bilateral knees. Plan:   Treatment : I reviewed the X-ray with the patient. We discussed the etiologies and natural histories of recurrent patella dislocations of the right knee and patellofemoral pain syndrome of the left knee. We discussed the various treatment alternatives including anti-inflammatory medications, physical therapy, injections, further imaging studies and as a last result surgery. During today's visit, I explained to the patient that she will need the same procedure as her mom had because of her genetic makeup. I told her she was born with her knee caps sitting more laterally and with a shallow trochlear groove. The only way to fix her alignment is with a surgery to move the patella over. To start planning for this type of surgery, I explained to the patient and her mother that I will need a CT scan with a 3D reconstruction. I also told them that I may need a MRI test to look at the arthritis and possible cartilage damage.  However, once we have the CT scan completed, I explained to them that I will take a look at the results and let them know if we need to move forward with a MRI test. If we need the MRI test, then I informed the patient that I will go over everything at her pre-operative discussion appointment. The patient has elected in proceeding with a tibial osteotomy surgery with the right knee. The risks/benefits/alternatives and potential complications have been discussed with the patient. We also had a long discussion regarding the procedure itself and expectation of the recovery. All questions and concerns with addressed. Patient was instructed to call our office with any question or concerns prior to the procedure occurs. I spent 15-20 minutes face to face time with the patient during today's encounter. A physical therapy prescription was not given. Patient should return to the clinic one week before surgery for her pre-operative discussion to follow up with Natanael January D.O. . The patient will call the office immediately with any problems. No orders of the defined types were placed in this encounter. Orders Placed This Encounter   Procedures    CT KNEE RIGHT WO CONTRAST     CT with 3D reconstruction     Standing Status:   Future     Standing Expiration Date:   5/21/2022     Scheduling Instructions:      CT with 3D reconstruction     Order Specific Question:   Reason for exam:     Answer:   pre op planning, needs a TT and TG distance     Order Specific Question:   Reason for exam:     Answer:   3D reconstruction       I, Bobette Landau, MS, AT, ATC, am scribing for and in the presence of Natanael January D.O.. 5/23/2021  7:57 PM        Electronically signed by Janett Le DO, on 5/23/2021 at 7:57 PM           I, Natanael January DO, have personally seen this patient and I have reviewed the CC, PMH, FHX and Social History as provided by other clinical staff. I reassessed the HPI and ROS as scribed by Bobette Landau, ATC in my presence and it is both accurate and complete. Thereafter, I personally performed the PE, reviewed the imaging and established the DX and POC.  I agree with the documentation provided by the . I have reviewed all documentation in its entirety prior to providing my signature indicating agreement. Any areas of disagreement are noted on the chart.     Electronically signed by Author Francisco DO on 5/23/2021 at 7:58 PM

## 2021-05-27 ENCOUNTER — HOSPITAL ENCOUNTER (OUTPATIENT)
Age: 26
Setting detail: SPECIMEN
Discharge: HOME | End: 2021-05-27
Payer: MEDICARE

## 2021-05-27 ENCOUNTER — OFFICE VISIT (OUTPATIENT)
Dept: GASTROENTEROLOGY | Age: 26
End: 2021-05-27
Payer: MEDICARE

## 2021-05-27 VITALS
DIASTOLIC BLOOD PRESSURE: 63 MMHG | SYSTOLIC BLOOD PRESSURE: 98 MMHG | HEIGHT: 63 IN | WEIGHT: 197 LBS | HEART RATE: 56 BPM | BODY MASS INDEX: 34.91 KG/M2

## 2021-05-27 DIAGNOSIS — R19.7 DIARRHEA, UNSPECIFIED TYPE: ICD-10-CM

## 2021-05-27 DIAGNOSIS — R11.14 BILIOUS VOMITING WITH NAUSEA: Primary | ICD-10-CM

## 2021-05-27 DIAGNOSIS — R63.4 WEIGHT LOSS: ICD-10-CM

## 2021-05-27 DIAGNOSIS — K62.5 HEMORRHAGE OF RECTUM AND ANUS: ICD-10-CM

## 2021-05-27 DIAGNOSIS — Z90.49 S/P CHOLE: ICD-10-CM

## 2021-05-27 PROCEDURE — 1036F TOBACCO NON-USER: CPT | Performed by: INTERNAL MEDICINE

## 2021-05-27 PROCEDURE — G8417 CALC BMI ABV UP PARAM F/U: HCPCS | Performed by: INTERNAL MEDICINE

## 2021-05-27 PROCEDURE — 36415 COLL VENOUS BLD VENIPUNCTURE: CPT

## 2021-05-27 PROCEDURE — G8427 DOCREV CUR MEDS BY ELIG CLIN: HCPCS | Performed by: INTERNAL MEDICINE

## 2021-05-27 PROCEDURE — 82784 ASSAY IGA/IGD/IGG/IGM EACH: CPT

## 2021-05-27 PROCEDURE — 83516 IMMUNOASSAY NONANTIBODY: CPT

## 2021-05-27 PROCEDURE — 99204 OFFICE O/P NEW MOD 45 MIN: CPT | Performed by: INTERNAL MEDICINE

## 2021-05-27 RX ORDER — CLONIDINE HYDROCHLORIDE 0.1 MG/1
0.3 TABLET ORAL NIGHTLY
Status: ON HOLD | COMMUNITY
Start: 2021-05-04 | End: 2021-06-19 | Stop reason: HOSPADM

## 2021-05-27 RX ORDER — PROPRANOLOL HYDROCHLORIDE 10 MG/1
TABLET ORAL
COMMUNITY
Start: 2021-05-04 | End: 2021-11-29

## 2021-05-27 RX ORDER — DEXTROAMPHETAMINE SACCHARATE, AMPHETAMINE ASPARTATE, DEXTROAMPHETAMINE SULFATE AND AMPHETAMINE SULFATE 2.5; 2.5; 2.5; 2.5 MG/1; MG/1; MG/1; MG/1
10 TABLET ORAL DAILY PRN
COMMUNITY
Start: 2021-04-22 | End: 2021-11-29

## 2021-05-27 RX ORDER — POLYETHYLENE GLYCOL 3350 17 G/17G
POWDER, FOR SOLUTION ORAL
Qty: 238 G | Refills: 0 | Status: SHIPPED | OUTPATIENT
Start: 2021-05-27 | End: 2021-06-22 | Stop reason: ALTCHOICE

## 2021-05-27 RX ORDER — BISACODYL 5 MG
TABLET, DELAYED RELEASE (ENTERIC COATED) ORAL
Qty: 4 TABLET | Refills: 0 | Status: SHIPPED | OUTPATIENT
Start: 2021-05-27 | End: 2021-06-22 | Stop reason: ALTCHOICE

## 2021-05-27 ASSESSMENT — ENCOUNTER SYMPTOMS
ALLERGIC/IMMUNOLOGIC NEGATIVE: 1
NAUSEA: 1
ABDOMINAL PAIN: 0
VOMITING: 1
EYES NEGATIVE: 1
DIARRHEA: 1
CONSTIPATION: 1
ABDOMINAL DISTENTION: 0
ANAL BLEEDING: 1
BLOOD IN STOOL: 1
RESPIRATORY NEGATIVE: 1
RECTAL PAIN: 0

## 2021-05-27 NOTE — PROGRESS NOTES
Reason for Referral:   Matiascleveland Fang, APRN - CNP  [address],  [address]    Chief Complaint   Patient presents with   [facility]    Nausea & Vomiting     per patient she vomits every time she eats - per mother this started over a year ago           HISTORY OF PRESENT ILLNESS: Patricia Rankin is a 30 y.o. female , referred for evaluation of*nausea and vomiting  Here for the first time   hx plat disorder . Psych issues  Used to have heavy periods and and anemia , had ablation and bleeding supsided   Mainly today here for n/v > 1 yr   lost 100 lb   No pain   Some times bloody stool  Some times coffee ground emesis   does se some NSAID and Tylenol  Takes Zofran  No ht burn    no dysphagia/odynophagia   belching with foul smelling   Mom doing all the talking ? S/p stefanie ,10 yrs ago  Diarrhea , 4-5 day, loose stool        Past Medical,Family, and Social History reviewed and does contribute to the patient presentingcondition. Patient's PMH/PSH,SH,PSYCH Hx, MEDs, ALLERGIES, and ROS were all reviewed and updated in the appropriate sections.     PAST MEDICAL HISTORY:  Past Medical History:   Diagnosis Date    Anemia     Anxiety     [facility] disorder ([facility]     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[facility]     Depression     Obesity     Seizures ([facility] [address] [facility]        Past Surgical History:   Procedure Laterality Date    CHOLECYSTECTOMY, LAPAROSCOPIC  5-20-16    ENDOMETRIAL ABLATION  2018    EYE SURGERY Left     cyst removed    HERNIA REPAIR      INTRAUTERINE DEVICE INSERTION      removed July 16, 2015    TONSILLECTOMY AND ADENOIDECTOMY      TUBAL LIGATION  2018    WISDOM TOOTH EXTRACTION         CURRENT MEDICATIONS:    Current Outpatient Medications:     amphetamine-dextroamphetamine (ADDERALL, 10MG,) 10 MG tablet, , Disp: , Rfl:     cloNIDine (CATAPRES) 0.1 MG tablet, , Disp: , Rfl:     propranolol (INDERAL) 10 MG tablet, take 1 tablet by mouth once daily if needed, Disp: , Rfl:    vitamin D (ERGOCALCIFEROL) 1.25 MG (60784 UT) CAPS capsule, Take 1 capsule by mouth once a week, Disp: 12 capsule, Rfl: 1    ondansetron (ZOFRAN ODT) 4 MG disintegrating tablet, Take 1 tablet by mouth every 8 hours as needed for Nausea, Disp: 20 tablet, Rfl: 0    butalbital-acetaminophen-caffeine (FIORICET, ESGIC) -40 MG per tablet, Take 1 tablet by mouth every 4 hours as needed for Headaches, Disp: 20 tablet, Rfl: 0    albuterol sulfate  (90 Base) MCG/ACT inhaler, Inhale 2 puffs into the lungs every 6 hours as needed, Disp: , Rfl:     ALLERGIES:   Allergies   Allergen Reactions    Latex Rash    Sulfa Antibiotics Anaphylaxis       FAMILY HISTORY:       Problem Relation Age of Onset    Diabetes Maternal Grandmother     Heart Disease Maternal Grandmother          SOCIAL HISTORY:   Social History     Socioeconomic History    Marital status: Single     Spouse name: Not on file    Number of children: Not on file    Years of education: Not on file    Highest education level: Not on file   Occupational History    Not on file   Tobacco Use    Smoking status: Former Smoker     Packs/day: 1.00     Years: 5.00     Pack years: 5.00     Types: Cigarettes     Quit date: 2016     Years since quittin.2    Smokeless tobacco: Never Used   Substance and Sexual Activity    Alcohol use: No     Alcohol/week: 0.0 standard drinks     Comment: 2016-stopped drinking    Drug use: Yes     Frequency: 7.0 times per week     Types: Marijuana     Comment: 2016-daily marijuana use    Sexual activity: Yes     Partners: Male     Birth control/protection: Condom   Other Topics Concern    Not on file   Social History Narrative    Not on file     Social Determinants of Health     Financial Resource Strain: Low Risk     Difficulty of Paying Living Expenses: Not hard at all   Food Insecurity: No Food Insecurity    Worried About Running Out of Food in the Last Year: Never true    Jass of Food in the Last Year: Never true   Transportation Needs: Unmet Transportation Needs    Lack of Transportation (Medical): Yes    Lack of Transportation (Non-Medical): Yes   Physical Activity:     Days of Exercise per Week:     Minutes of Exercise per Session:    Stress:     Feeling of Stress :    Social Connections:     Frequency of Communication with Friends and Family:     Frequency of Social Gatherings with Friends and Family:     Attends Yazdanism Services:     Active Member of Clubs or Organizations:     Attends Club or Organization Meetings:     Marital Status:    Intimate Partner Violence:     Fear of Current or Ex-Partner:     Emotionally Abused:     Physically Abused:     Sexually Abused:        REVIEW OF SYSTEMS: A 12-point review of systemswas obtained and pertinent positives and negatives were enumerated above in the history of present illness. All other reviewed systems / symptoms were negative. Review of Systems   Constitutional: Positive for activity change, appetite change, fatigue and unexpected weight change. Negative for chills, diaphoresis and fever. HENT: Negative. Eyes: Negative. Respiratory: Negative. Cardiovascular: Negative. Gastrointestinal: Positive for anal bleeding, blood in stool, constipation, diarrhea, nausea and vomiting. Negative for abdominal distention, abdominal pain and rectal pain. Endocrine: Negative. Genitourinary: Negative. Musculoskeletal: Negative. Skin: Negative. Allergic/Immunologic: Negative. Neurological: Positive for dizziness and headaches. Negative for tremors, seizures, syncope, facial asymmetry, speech difficulty, weakness, light-headedness and numbness. Hematological: Negative. Psychiatric/Behavioral: Negative.             LABORATORY DATA: Reviewed  Lab Results   Component Value Date    WBC 9.7 04/08/2021    HGB 15.1 04/08/2021    HCT 46.0 04/08/2021    MCV 94 04/08/2021     04/08/2021     04/08/2021    K 3.8 98/63 (Site: Right Upper Arm, Position: Sitting, Cuff Size: Medium Adult)   Pulse 56   Ht 5' 3\" (1.6 m)   Wt 197 lb (89.4 kg)   BMI 34.90 kg/m²     PHYSICAL EXAMINATION: Vital signs reviewed per the nursing documentation. Body mass index is 34.9 kg/m². Physical Exam  Vitals and nursing note reviewed. Constitutional:       General: She is not in acute distress. Appearance: She is well-developed. She is not diaphoretic. HENT:      Head: Normocephalic. Mouth/Throat:      Pharynx: No oropharyngeal exudate. Eyes:      General: No scleral icterus. Pupils: Pupils are equal, round, and reactive to light. Neck:      Thyroid: No thyromegaly. Vascular: No JVD. Trachea: No tracheal deviation. Cardiovascular:      Rate and Rhythm: Normal rate and regular rhythm. Heart sounds: Normal heart sounds. No murmur heard. Pulmonary:      Effort: Pulmonary effort is normal. No respiratory distress. Breath sounds: Normal breath sounds. No wheezing. Abdominal:      General: Bowel sounds are normal. There is no distension. Palpations: Abdomen is soft. Tenderness: There is no abdominal tenderness. There is no guarding or rebound. Comments: No ascites   Musculoskeletal:         General: Normal range of motion. Cervical back: Normal range of motion and neck supple. Skin:     General: Skin is warm. Coloration: Skin is not pale. Findings: No erythema or rash. Comments: She is not diaphoretic   Neurological:      Mental Status: She is alert and oriented to person, place, and time. Deep Tendon Reflexes: Reflexes are normal and symmetric. Psychiatric:         Behavior: Behavior normal.         Thought Content: Thought content normal.         Judgment: Judgment normal.         IMPRESSION: Ms. Nadege Wray is a 30 y.o. female with      Diagnosis Orders   1. Bilious vomiting with nausea  EGD   2. Weight loss     3.  Diarrhea, unspecified type COLONOSCOPY W/ OR W/O BIOPSY    Celiac Disease Panel   4. S/P stefanie     5. Hemorrhage of rectum and anus     This patient might have  ? Gastroparesis, will rule out other causes at first, then we can consider gastric emptying study. We will proceed with the above as a first step, we will continue with Zofran as needed, and after we scoped her we will consider giving her on PPI  Diet/life style/natural hx /complication of the dx were all explained in details   Past medical, past surgical, social history, psychiatric history, medications or allergies, all reviewed and  updated    Spent 51 minutes providing patient education and counseling. Thank you for allowing me to participate in the care of Ms. Vernon. For any further questions please do not hesitate to contact me. I have reviewed and agree with the MA/RN ROS. Note is dictated utilizing voice recognition software. Unfortunately this leads to occasional typographical errors. Please contact our office if you have any questions.     Maria Luisa Soto MD  [facility]  O: #[phone]

## 2021-05-28 ENCOUNTER — TELEPHONE (OUTPATIENT)
Dept: GASTROENTEROLOGY | Age: 26
End: 2021-05-28

## 2021-05-28 ENCOUNTER — HOSPITAL ENCOUNTER (OUTPATIENT)
Dept: CT IMAGING | Age: 26
Discharge: HOME | End: 2021-05-30
Payer: MEDICARE

## 2021-05-28 ENCOUNTER — HOSPITAL ENCOUNTER (OUTPATIENT)
Dept: PREADMISSION TESTING | Age: 26
Discharge: HOME | End: 2021-06-01
Payer: MEDICARE

## 2021-05-28 ENCOUNTER — ANESTHESIA EVENT (OUTPATIENT)
Dept: OPERATING ROOM | Age: 26
End: 2021-05-28
Payer: MEDICARE

## 2021-05-28 VITALS
HEART RATE: 67 BPM | BODY MASS INDEX: 35.72 KG/M2 | TEMPERATURE: 98.6 F | OXYGEN SATURATION: 99 % | RESPIRATION RATE: 20 BRPM | HEIGHT: 63 IN | DIASTOLIC BLOOD PRESSURE: 72 MMHG | SYSTOLIC BLOOD PRESSURE: 111 MMHG | WEIGHT: 201.6 LBS

## 2021-05-28 DIAGNOSIS — S83.004A DISLOCATION OF RIGHT PATELLA, INITIAL ENCOUNTER: ICD-10-CM

## 2021-05-28 DIAGNOSIS — M22.01 RECURRENT DISLOCATION OF PATELLA, RIGHT: ICD-10-CM

## 2021-05-28 DIAGNOSIS — M22.2X2 PATELLOFEMORAL SYNDROME OF LEFT KNEE: ICD-10-CM

## 2021-05-28 LAB
ANION GAP SERPL CALCULATED.3IONS-SCNC: 9 MMOL/L (ref 9–17)
BUN BLDV-MCNC: 15 MG/DL (ref 6–20)
BUN/CREAT BLD: 27 (ref 9–20)
CALCIUM SERPL-MCNC: 9.5 MG/DL (ref 8.6–10.4)
CHLORIDE BLD-SCNC: 104 MMOL/L (ref 98–107)
CO2: 24 MMOL/L (ref 20–31)
CREAT SERPL-MCNC: 0.55 MG/DL (ref 0.5–0.9)
GFR AFRICAN AMERICAN: >60 ML/MIN
GFR NON-AFRICAN AMERICAN: >60 ML/MIN
GFR SERPL CREATININE-BSD FRML MDRD: ABNORMAL ML/MIN/{1.73_M2}
GFR SERPL CREATININE-BSD FRML MDRD: ABNORMAL ML/MIN/{1.73_M2}
GLIADIN DEAMINIDATED PEPTIDE AB IGA: 0.1 U/ML
GLIADIN DEAMINIDATED PEPTIDE AB IGG: <0.4 U/ML
GLUCOSE BLD-MCNC: 73 MG/DL (ref 70–99)
HCT VFR BLD CALC: 44.2 % (ref 36.3–47.1)
HEMOGLOBIN: 13.8 G/DL (ref 11.9–15.1)
IGA: 72 MG/DL (ref 70–400)
INR BLD: 1
MCH RBC QN AUTO: 31.4 PG (ref 25.2–33.5)
MCHC RBC AUTO-ENTMCNC: 31.2 G/DL (ref 28.4–34.8)
MCV RBC AUTO: 100.7 FL (ref 82.6–102.9)
NRBC AUTOMATED: 0 PER 100 WBC
PARTIAL THROMBOPLASTIN TIME: 30.1 SEC (ref 23.9–33.8)
PDW BLD-RTO: 13.5 % (ref 11.8–14.4)
PLATELET # BLD: 159 K/UL (ref 138–453)
PMV BLD AUTO: 11.4 FL (ref 8.1–13.5)
POTASSIUM SERPL-SCNC: 4.2 MMOL/L (ref 3.7–5.3)
PROTHROMBIN TIME: 12.8 SEC (ref 11.5–14.2)
RBC # BLD: 4.39 M/UL (ref 3.95–5.11)
SODIUM BLD-SCNC: 137 MMOL/L (ref 135–144)
TISSUE TRANSGLUTAMINASE IGA: <0.1 U/ML
WBC # BLD: 8.9 K/UL (ref 3.5–11.3)

## 2021-05-28 PROCEDURE — 85027 COMPLETE CBC AUTOMATED: CPT

## 2021-05-28 PROCEDURE — 36415 COLL VENOUS BLD VENIPUNCTURE: CPT

## 2021-05-28 PROCEDURE — 85730 THROMBOPLASTIN TIME PARTIAL: CPT

## 2021-05-28 PROCEDURE — 73700 CT LOWER EXTREMITY W/O DYE: CPT

## 2021-05-28 PROCEDURE — 85610 PROTHROMBIN TIME: CPT

## 2021-05-28 PROCEDURE — 80048 BASIC METABOLIC PNL TOTAL CA: CPT

## 2021-05-28 PROCEDURE — 76376 3D RENDER W/INTRP POSTPROCES: CPT

## 2021-05-28 RX ORDER — DEXTROAMPHETAMINE SACCHARATE, AMPHETAMINE ASPARTATE MONOHYDRATE, DEXTROAMPHETAMINE SULFATE AND AMPHETAMINE SULFATE 5; 5; 5; 5 MG/1; MG/1; MG/1; MG/1
20 CAPSULE, EXTENDED RELEASE ORAL EVERY MORNING
COMMUNITY
End: 2021-11-29

## 2021-05-28 NOTE — PRE-PROCEDURE INSTRUCTIONS
[facility] ON Friday, 6/18/2021 at 8:00 AM    Once you enter the hospital lobby take the elevators to the second floor. Check-In is at the surgery registration desk    One person age 25 or older may remain in the waiting room while you are in surgery. Continue to take your home medications as you normally do up to and including the night before surgery with the exception of any blood thinning medications. Please stop any blood thinning medications as directed by your surgeon or prescribing physician. Failure to stop certain medications may interfere with your scheduled surgery. These may include:  Aspirin, Warfarin (Coumadin), Clopidogrel (Plavix), Ibuprofen (Motrin, Advil), Naproxen (Aleve), Meloxicam (Mobic), Celecoxib (Celebrex), Eliquis, Pradaxa, Xarelto, Effient, Fish Oil, Herbal supplements. none        Please take the following medication(s) the day of surgery with a small sip of water:  Propranolol if needed; Ondansetron if needed    Please use your inhaler(s) if needed and bring your inhaler(s) from home the day of surgery. PREPARING FOR YOUR SURGERY:     Before surgery, you can play an important role in your own health. Because skin is not sterile, we need to be sure that your skin is as free of germs as possible before surgery by carefully washing before surgery. Preparing or prepping skin before surgery can reduce the risk of a surgical site infection.   Do not shave the area of your body where your surgery will be performed unless you received specific permission from your physician. You will need to shower at home the night before surgery and the morning of surgery with a special soap called chlorhexidine gluconate (CHG*). *Not to be used by people allergic to Chlorhexidine Gluconate (CHG). Following these instructions will help you be sure that your skin is clean before surgery. Instructions on cleaning your skin before surgery:      The night before your surgery:      You will need to shower with warm water (not hot) and the CHG soap.  Use a clean wash cloth and a clean towel. Have clean clothes available to put on after the shower.   First wash your hair with regular shampoo. Rinse your hair and body thoroughly to remove the shampoo.  Wash your face and genital area (private parts) with your regular soap or water only. Thoroughly rinse your body with warm water from the neck down.  Turn water off to prevent rinsing the soap off too soon.  With a clean wet washcloth and half of the CHG soap in the bottle, lather your entire body from the neck down. Do not use CHG soap near your eyes or ears to avoid injury to those areas.  Wash thoroughly, paying special attention to the area where your surgery will be performed.  Wash your body gently for five (5) minutes. Avoid scrubbing your skin too hard.  Turn the water back on and rinse your body thoroughly.  Pat yourself dry with a clean, soft towel. Do not apply lotion, cream or powder.  Dress with clean freshly washed clothes. The morning of surgery:     Repeat shower following steps above - using remaining half of CHG soap in bottle. Patient Instructions:     [facility] If you are having any type of anesthesia you are to have nothing to eat or drink after midnight the night before your surgery. This includes gum, hard candy, mints, water or smoking or chewing tobacco.  The only exception to this is a small sip of water to take with any morning dose of heart, blood pressure, or seizure medications. No alcoholic beverages for 24 hours prior to surgery.  Brush your teeth but do not swallow water.  Bring your eye glasses and case with you. No contacts are to be worn the day of surgery. You also may bring your hearing aids. Most surgical procedures involving anesthesia will require that you remove your dentures prior to surgery.      If you are on C-PAP or Bi-PAP at home and plan on staying in the hospital overnight for your surgery please bring the machine with you. · Do not wear any jewelry or body piercings day of surgery. Also, NO lotion, perfume or deodorant to be used the day of surgery. No nail polish on the operative extremity (arm/leg surgeries)    · If you are staying overnight with us, please bring a small bag of necessary personal items.  Please wear loose, comfortable clothing. If you are potentially going to have a cast or brace bring clothing that will fit over them.  In case of illness - If you have cold or flu like symptoms (high fever, runny nose, sore throat, cough, etc.) rash, nausea, vomiting, loose stools, and/or recent contact with someone who has a contagious disease (chicken pox, measles, etc.) Please call your doctor before coming to the hospital.         Day of Surgery/Procedure:    As a patient at [facility] - INPATIENT you can expect quality medical and nursing care that is centered on your individual needs. Our goal is to make your surgical experience as comfortable as possible    . Transportation After Your Surgery/Procedure: You will need a friend or family member to drive you home after your procedure. Your  must be 25years of age or older. Discharge instructions will be reviewed with family/friend by phone. A taxi cab or any other form of public transportation is not acceptable. Someone must remain with you for the first 24 hours after your surgery if you receive anesthesia or medication. If you do not have someone to stay with you, your procedure may be cancelled.       If you have any other questions regarding your procedure or the day of surgery, please call [phone]      _________________________  ____________________________  Signature (Patient)    Signature (Provider)            Date

## 2021-05-30 DIAGNOSIS — G43.009 MIGRAINE WITHOUT AURA AND WITHOUT STATUS MIGRAINOSUS, NOT INTRACTABLE: ICD-10-CM

## 2021-05-30 DIAGNOSIS — K92.0 COFFEE GROUND EMESIS: ICD-10-CM

## 2021-06-02 NOTE — TELEPHONE ENCOUNTER
W/O BIOPSY Once 05/27/2021               Patient Active Problem List:     Migraine headache     Vaginal bleeding     Anxiety     Vitamin D insufficiency     PTSD (post-traumatic stress disorder)     Excessive and frequent menstruation     Adult body mass index 40 and over     Attention deficit disorder without hyperactivity     Calculus of gallbladder with chronic cholecystitis without obstruction     Developmental disorder of scholastic skill     Endometriosis     Esophageal reflux     Observation of other suspected mental condition     Pelvic congestion     Platelet dense granule deficiency ([address]     Platelet function defect ([address]     Morbid obesity ([address]

## 2021-06-03 DIAGNOSIS — S83.004A DISLOCATION OF RIGHT PATELLA, INITIAL ENCOUNTER: Primary | ICD-10-CM

## 2021-06-03 RX ORDER — BUTALBITAL, ACETAMINOPHEN AND CAFFEINE 50; 325; 40 MG/1; MG/1; MG/1
1 TABLET ORAL EVERY 4 HOURS PRN
Qty: 20 TABLET | Refills: 0 | Status: SHIPPED | OUTPATIENT
Start: 2021-06-03 | End: 2021-06-20 | Stop reason: SDUPTHER

## 2021-06-03 RX ORDER — ONDANSETRON 4 MG/1
4 TABLET, ORALLY DISINTEGRATING ORAL EVERY 8 HOURS PRN
Qty: 20 TABLET | Refills: 0 | Status: SHIPPED | OUTPATIENT
Start: 2021-06-03 | End: 2021-06-20 | Stop reason: SDUPTHER

## 2021-06-15 ENCOUNTER — HOSPITAL ENCOUNTER (OUTPATIENT)
Dept: MRI IMAGING | Facility: CLINIC | Age: 26
Discharge: HOME | End: 2021-06-17
Payer: MEDICARE

## 2021-06-15 DIAGNOSIS — S83.004A DISLOCATION OF RIGHT PATELLA, INITIAL ENCOUNTER: ICD-10-CM

## 2021-06-15 PROCEDURE — 73721 MRI JNT OF LWR EXTRE W/O DYE: CPT

## 2021-06-17 ENCOUNTER — OFFICE VISIT (OUTPATIENT)
Dept: ORTHOPEDIC SURGERY | Age: 26
End: 2021-06-17
Payer: MEDICARE

## 2021-06-17 ENCOUNTER — HOSPITAL ENCOUNTER (OUTPATIENT)
Dept: PREADMISSION TESTING | Age: 26
Discharge: HOME | End: 2021-06-21
Payer: MEDICARE

## 2021-06-17 VITALS — HEIGHT: 63 IN | BODY MASS INDEX: 33.66 KG/M2 | WEIGHT: 190 LBS

## 2021-06-17 VITALS — HEIGHT: 63 IN | BODY MASS INDEX: 34.2 KG/M2 | WEIGHT: 193 LBS

## 2021-06-17 DIAGNOSIS — M22.01 RECURRENT DISLOCATION OF PATELLA, RIGHT: Primary | ICD-10-CM

## 2021-06-17 PROCEDURE — G8427 DOCREV CUR MEDS BY ELIG CLIN: HCPCS | Performed by: ORTHOPAEDIC SURGERY

## 2021-06-17 PROCEDURE — G8417 CALC BMI ABV UP PARAM F/U: HCPCS | Performed by: ORTHOPAEDIC SURGERY

## 2021-06-17 PROCEDURE — 99214 OFFICE O/P EST MOD 30 MIN: CPT | Performed by: ORTHOPAEDIC SURGERY

## 2021-06-17 PROCEDURE — 1036F TOBACCO NON-USER: CPT | Performed by: ORTHOPAEDIC SURGERY

## 2021-06-17 ASSESSMENT — ENCOUNTER SYMPTOMS
CHEST TIGHTNESS: 0
SHORTNESS OF BREATH: 0
VOMITING: 0
COUGH: 0
DIARRHEA: 0
ABDOMINAL PAIN: 0
APNEA: 0
ABDOMINAL DISTENTION: 0
NAUSEA: 0
COLOR CHANGE: 0
CONSTIPATION: 0

## 2021-06-17 NOTE — PROGRESS NOTES

## 2021-06-17 NOTE — PROGRESS NOTES
and leg swelling. Gastrointestinal: Negative for abdominal distention, abdominal pain, constipation, diarrhea, nausea and vomiting. Genitourinary: Negative for difficulty urinating, dysuria and hematuria. Musculoskeletal: Positive for gait problem and myalgias. Negative for arthralgias and joint swelling. Skin: Negative for color change and rash. Neurological: Negative for dizziness, weakness, numbness and headaches. Psychiatric/Behavioral: Negative for sleep disturbance.        Past Medical History:    Past Medical History:   Diagnosis Date    Anemia     Anxiety     is treated with Catapres and Inderal; anxiety causes her to pick at her skin    Bipolar disorder ([facility] [address]     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[facility] [address]     Depression     Hypotension     side effect of Inderal and Catapres taken for anxiety    Learning disability     Non-verbal learning disability, has no real concept of time, money, misinterprets body language    Nausea & vomiting     been going on since June/July 2020, is being worked up by Dr Benton Vargas Obesity     PONV (postoperative nausea and vomiting)     usually has a scopalamine patch for surgery    Seizures ([facility] [address]     had one seizure in early adolescence none since    SOB (shortness of breath)     with activity at times, unable to climb a flight of stairs without SOB       Past Surgical History:    Past Surgical History:   Procedure Laterality Date    CHOLECYSTECTOMY, LAPAROSCOPIC  5-20-16    ENDOMETRIAL ABLATION  2018    EYE SURGERY Left     cyst removed left eyelid    HERNIA REPAIR      INTRAUTERINE DEVICE INSERTION      removed July 16, 2015    KNEE ARTHROSCOPY Right 6/18/2021    RIGHT KNEE ARTHROSCOPY WITH DEBRIDEMENT AND LATERAL RELEASE, TIBIAL TUBERCLE OSTEOTOMY performed by Adrianne Lopez DO at [address]  2018    WISDOM TOOTH EXTRACTION         CurrentMedications:   Current Outpatient Medications   Medication Sig Dispense Refill    ondansetron (ZOFRAN ODT) 4 MG disintegrating tablet Take 1 tablet by mouth every 8 hours as needed for Nausea 20 tablet 0    butalbital-acetaminophen-caffeine (FIORICET, ESGIC) -40 MG per tablet Take 1 tablet by mouth every 4 hours as needed for Headaches 20 tablet 0    amphetamine-dextroamphetamine (ADDERALL XR) 20 MG extended release capsule Take 20 mg by mouth every morning.  amphetamine-dextroamphetamine (ADDERALL, 10MG,) 10 MG tablet Take 10 mg by mouth daily as needed (if needed at work).  propranolol (INDERAL) 10 MG tablet take 1 tablet by mouth once daily if needed      polyethylene glycol (GLYCOLAX) 17 GM/SCOOP powder Follow instructions provided to you from physician's office. 238 g 0    bisacodyl (BISACODYL) 5 MG EC tablet Follow instructions provided given by the physician's office. 4 tablet 0    magnesium citrate solution Take 296 mLs by mouth once for 1 dose 296 mL 0    vitamin D (ERGOCALCIFEROL) 1.25 MG (47887 UT) CAPS capsule Take 1 capsule by mouth once a week 12 capsule 1    albuterol sulfate  (90 Base) MCG/ACT inhaler Inhale 2 puffs into the lungs every 6 hours as needed      cloNIDine (CATAPRES) 0.2 MG tablet Take 1 tablet by mouth nightly 60 tablet 3    tranexamic acid (LYSTEDA) 650 MG TABS tablet Take 1,300 mg by mouth 3 times daily      oxyCODONE-acetaminophen (PERCOCET) 5-325 MG per tablet Take 1 tablet by mouth every 4 hours as needed for Pain for up to 7 days. Intended supply: 7 days. Take lowest dose possible to manage pain 42 tablet 0    promethazine (PHENERGAN) 12.5 MG tablet Take 1 tablet by mouth 3 times daily as needed for Nausea 14 tablet 0     No current facility-administered medications for this visit.        Allergies:    Latex, Poison ivy extract, and Sulfa antibiotics    Social History:   Social History     Socioeconomic History    Marital status: Single     Spouse name: None    Number of children: None    Years of education: None    Highest education level: None   Occupational History    None   Tobacco Use    Smoking status: Former Smoker     Packs/day: 1.00     Years: 5.00     Pack years: 5.00     Types: Cigarettes     Quit date: 2021     Years since quittin.3    Smokeless tobacco: Never Used   Vaping Use    Vaping Use: Every day    Substances: Nicotine   Substance and Sexual Activity    Alcohol use: No     Alcohol/week: 0.0 standard drinks     Comment: 2016-stopped drinking    Drug use: Yes     Frequency: 7.0 times per week     Types: Marijuana     Comment: 2016-daily marijuana use, improves appetite and helps her sleep    Sexual activity: Yes     Partners: Male     Birth control/protection: Condom   Other Topics Concern    None   Social History Narrative    None     Social Determinants of Health     Financial Resource Strain: Low Risk     Difficulty of Paying Living Expenses: Not hard at all   Food Insecurity: No Food Insecurity    Worried About Running Out of Food in the Last Year: Never true    Jass of Food in the Last Year: Never true   Transportation Needs: Unmet Transportation Needs    Lack of Transportation (Medical):  Yes    Lack of Transportation (Non-Medical): Yes   Physical Activity:     Days of Exercise per Week:     Minutes of Exercise per Session:    Stress:     Feeling of Stress :    Social Connections:     Frequency of Communication with Friends and Family:     Frequency of Social Gatherings with Friends and Family:     Attends Restorationist Services:     Active Member of Clubs or Organizations:     Attends Club or Organization Meetings:     Marital Status:    Intimate Partner Violence:     Fear of Current or Ex-Partner:     Emotionally Abused:     Physically Abused:     Sexually Abused:        Family History:  Family History   Problem Relation Age of Onset    Diabetes Maternal Grandmother     Heart Disease Maternal Grandmother        Vitals:   Ht 5' 3\" (1.6 m)   Wt 193 lb (87.5 kg)   BMI 34.19 kg/m²  Body mass index is 34.19 kg/m². Physical Examination:     Orthopedics:    GENERAL: Alert and oriented X3 in no acute distress. SKIN: Intact without lesions or ulcerations. NEURO: Intact to sensory and motor testing. VASC: Capillary refill is less than 3 seconds. KNEE EXAM    LOCATION: Right Knee  GEN: Alert and oriented X 3, in no acute distress. GAIT: The patient's gait was observed while entering the exam room and was noted to be non antalgic. The extremity is in anatomic alignment. SKIN: Intact without rashes, lesions, or ulcerations. No obvious deformity or swelling. NEURO: The patient responds to light touch throughout right LE. Patellar and Achilles reflexes are 2/4. VASC: The right LE is neurovascularly intact with 2/4 DP and 2/4 PT pulses. Brisk capillary refill. ROM: -20/140 degrees. There is no effusion. J sign with patella andreas  MUSC: good quad tone  LIGAMENT: Lachman's test is Negative with Good endpoint. Anterior drawer Negative. Posterior drawer Negative. There is No varus instability at 0 degrees and No varus instability at 30 degrees. There is No valgus instability at 0 degrees and No valgus instability at 30 degrees. Generalized collateral ligament laxity  SPECIAL: Bonita test is negative with no clunks, + patellofemoral crepitation, and + pain medially. PALP: There is no joint line pain. Patella apprehension. Can sublux her to 90 degrees of knee flexion  Assessment:     1. Recurrent dislocation of patella, right      Procedures:    Procedure: no  Radiology:   CT KNEE RIGHT WO CONTRAST    Result Date: 5/28/2021  Findings compatible with abnormal patellar tracking with abnormal TT-TG interval measured at 2.0 cm along with other findings as above. Minimal degenerative changes to the medial and patellofemoral compartments. Trace knee joint effusion along with small intra-articular loose body in the posterior medial compartment.      MRI KNEE RIGHT WO CONTRAST    Result Date: 6/15/2021  1. Moderate to severe chondral degenerative changes at the lateral patellar facet with mild lateral patellar subluxation noted. No evidence of recent lateral patellar dislocation, but there is intermediate signal at the patellar attachment of the medial patellar retinaculum, which may be related to old injury. 2.  Thin and attenuated LCL, possibly related to remote injury. 3.  Small joint effusion. 4.  Mild edematous signal in the superolateral Hoffa's fat, which can be seen with fat pad impingement. 5.  Incidental note of high origin of the anterior tibial artery. Plan:   Treatment : I reviewed the X-ray, MRI and CT Scan with the patient. We discussed the etiologies and natural histories of recurrent dislocation of the right patella. We discussed the various treatment alternatives including anti-inflammatory medications, physical therapy, injections, further imaging studies and as a last result surgery. During today's visit, we discussed the results of the MRI and procedure planned for tomorrow's surgery. The patient has opted for continuing with tomorrow's surgery for a right knee arthroscopy with AMZ and MPFL reconstruction. Patient has already signed consent for procedure and all risks and benefits where discussed with the patient and her mother again. A physical therapy prescription was not given. Patient should return to the clinic 1 week post-op to follow up with  Moy Avila PA-C. The patient will call the office immediately with any problems. No orders of the defined types were placed in this encounter. No orders of the defined types were placed in this encounter.       Mayelin JAIME MS, AT, ATC, am scribing for and in the presence of Rose Hilliard D.O.. 6/20/2021  7:54 PM    Electronically signed by Evita De La Vega DO, on 6/20/2021 at 7:54 PM     Rose JAIME DO, have personally seen this patient and I have reviewed the CC, PMH, FHX and Social History as provided by other clinical staff. I reassessed the HPI and ROS as scribed by Anne Ndiaye MS AT [facility] in my presence and it is both accurate and complete. Thereafter, I personally performed the PE, reviewed the imaging and established the DX and POC. I agree with the documentation provided by the . I have reviewed all documentation in its entirety prior to providing my signature indicating agreement. Any areas of disagreement are noted on the chart.     Electronically signed by Luc Page DO on 6/20/2021 at 7:54 PM

## 2021-06-18 ENCOUNTER — ANESTHESIA (OUTPATIENT)
Dept: OPERATING ROOM | Age: 26
End: 2021-06-18
Payer: MEDICARE

## 2021-06-18 ENCOUNTER — APPOINTMENT (OUTPATIENT)
Dept: GENERAL RADIOLOGY | Age: 26
End: 2021-06-18
Attending: ORTHOPAEDIC SURGERY
Payer: MEDICARE

## 2021-06-18 ENCOUNTER — HOSPITAL ENCOUNTER (OUTPATIENT)
Age: 26
Discharge: HOME | End: 2021-06-19
Attending: ORTHOPAEDIC SURGERY | Admitting: ORTHOPAEDIC SURGERY
Payer: MEDICARE

## 2021-06-18 VITALS — TEMPERATURE: 87.6 F | DIASTOLIC BLOOD PRESSURE: 78 MMHG | OXYGEN SATURATION: 100 % | SYSTOLIC BLOOD PRESSURE: 137 MMHG

## 2021-06-18 DIAGNOSIS — G89.18 POST-OP PAIN: Primary | ICD-10-CM

## 2021-06-18 PROBLEM — M22.01 RECURRENT DISLOCATION OF PATELLA, RIGHT KNEE: Status: ACTIVE | Noted: 2021-06-18

## 2021-06-18 LAB — HCG, PREGNANCY URINE (POC): NEGATIVE

## 2021-06-18 PROCEDURE — 2580000003 HC RX 258: Performed by: ANESTHESIOLOGY

## 2021-06-18 PROCEDURE — 3700000001 HC ADD 15 MINUTES (ANESTHESIA): Performed by: ORTHOPAEDIC SURGERY

## 2021-06-18 PROCEDURE — 6360000002 HC RX W HCPCS: Performed by: ANESTHESIOLOGY

## 2021-06-18 PROCEDURE — C9290 INJ, BUPIVACAINE LIPOSOME: HCPCS | Performed by: ANESTHESIOLOGY

## 2021-06-18 PROCEDURE — 2500000003 HC RX 250 WO HCPCS: Performed by: ANESTHESIOLOGY

## 2021-06-18 PROCEDURE — 6360000002 HC RX W HCPCS: Performed by: ORTHOPAEDIC SURGERY

## 2021-06-18 PROCEDURE — C1769 GUIDE WIRE: HCPCS | Performed by: ORTHOPAEDIC SURGERY

## 2021-06-18 PROCEDURE — C9359 IMPLNT,BON VOID FILLER-PUTTY: HCPCS | Performed by: ORTHOPAEDIC SURGERY

## 2021-06-18 PROCEDURE — 2720000010 HC SURG SUPPLY STERILE: Performed by: ORTHOPAEDIC SURGERY

## 2021-06-18 PROCEDURE — C1713 ANCHOR/SCREW BN/BN,TIS/BN: HCPCS | Performed by: ORTHOPAEDIC SURGERY

## 2021-06-18 PROCEDURE — 2500000003 HC RX 250 WO HCPCS: Performed by: NURSE ANESTHETIST, CERTIFIED REGISTERED

## 2021-06-18 PROCEDURE — 81025 URINE PREGNANCY TEST: CPT

## 2021-06-18 PROCEDURE — 6360000002 HC RX W HCPCS: Performed by: NURSE ANESTHETIST, CERTIFIED REGISTERED

## 2021-06-18 PROCEDURE — L1851 KO SINGLE UPRIGHT PREFAB OTS: HCPCS | Performed by: ORTHOPAEDIC SURGERY

## 2021-06-18 PROCEDURE — 2580000003 HC RX 258: Performed by: STUDENT IN AN ORGANIZED HEALTH CARE EDUCATION/TRAINING PROGRAM

## 2021-06-18 PROCEDURE — 3209999900 FLUORO FOR SURGICAL PROCEDURES

## 2021-06-18 PROCEDURE — 3600000013 HC SURGERY LEVEL 3 ADDTL 15MIN: Performed by: ORTHOPAEDIC SURGERY

## 2021-06-18 PROCEDURE — 2709999900 HC NON-CHARGEABLE SUPPLY: Performed by: ORTHOPAEDIC SURGERY

## 2021-06-18 PROCEDURE — 6370000000 HC RX 637 (ALT 250 FOR IP): Performed by: ANESTHESIOLOGY

## 2021-06-18 PROCEDURE — 76942 ECHO GUIDE FOR BIOPSY: CPT | Performed by: ANESTHESIOLOGY

## 2021-06-18 PROCEDURE — 3700000000 HC ANESTHESIA ATTENDED CARE: Performed by: ORTHOPAEDIC SURGERY

## 2021-06-18 PROCEDURE — 97116 GAIT TRAINING THERAPY: CPT

## 2021-06-18 PROCEDURE — 27418 REPAIR DEGENERATED KNEECAP: CPT | Performed by: ORTHOPAEDIC SURGERY

## 2021-06-18 PROCEDURE — 7100000000 HC PACU RECOVERY - FIRST 15 MIN: Performed by: ORTHOPAEDIC SURGERY

## 2021-06-18 PROCEDURE — 3600000003 HC SURGERY LEVEL 3 BASE: Performed by: ORTHOPAEDIC SURGERY

## 2021-06-18 PROCEDURE — 97530 THERAPEUTIC ACTIVITIES: CPT

## 2021-06-18 PROCEDURE — 6370000000 HC RX 637 (ALT 250 FOR IP): Performed by: STUDENT IN AN ORGANIZED HEALTH CARE EDUCATION/TRAINING PROGRAM

## 2021-06-18 PROCEDURE — 7100000001 HC PACU RECOVERY - ADDTL 15 MIN: Performed by: ORTHOPAEDIC SURGERY

## 2021-06-18 PROCEDURE — 29873 ARTHRS KNEE SURG W/LAT RLS: CPT | Performed by: ORTHOPAEDIC SURGERY

## 2021-06-18 PROCEDURE — 97162 PT EVAL MOD COMPLEX 30 MIN: CPT

## 2021-06-18 PROCEDURE — 99253 IP/OBS CNSLTJ NEW/EST LOW 45: CPT | Performed by: NURSE PRACTITIONER

## 2021-06-18 PROCEDURE — 6360000002 HC RX W HCPCS: Performed by: STUDENT IN AN ORGANIZED HEALTH CARE EDUCATION/TRAINING PROGRAM

## 2021-06-18 DEVICE — IMPLANTABLE DEVICE: Type: IMPLANTABLE DEVICE | Site: KNEE | Status: FUNCTIONAL

## 2021-06-18 DEVICE — C BONE PUTTY WITH DBM AND CANCELLOUS BONE CHIPS
Type: IMPLANTABLE DEVICE | Site: KNEE | Status: FUNCTIONAL
Brand: ALLOMATRIX

## 2021-06-18 RX ORDER — FENTANYL CITRATE 50 UG/ML
INJECTION, SOLUTION INTRAMUSCULAR; INTRAVENOUS PRN
Status: DISCONTINUED | OUTPATIENT
Start: 2021-06-18 | End: 2021-06-18 | Stop reason: SDUPTHER

## 2021-06-18 RX ORDER — TRANEXAMIC ACID 650 1/1
1300 TABLET ORAL 3 TIMES DAILY
COMMUNITY
End: 2021-06-22 | Stop reason: ALTCHOICE

## 2021-06-18 RX ORDER — SODIUM CHLORIDE 0.9 % (FLUSH) 0.9 %
10 SYRINGE (ML) INJECTION PRN
Status: DISCONTINUED | OUTPATIENT
Start: 2021-06-18 | End: 2021-06-19 | Stop reason: HOSPADM

## 2021-06-18 RX ORDER — DEXAMETHASONE SODIUM PHOSPHATE 10 MG/ML
INJECTION, SOLUTION INTRAMUSCULAR; INTRAVENOUS PRN
Status: DISCONTINUED | OUTPATIENT
Start: 2021-06-18 | End: 2021-06-18 | Stop reason: SDUPTHER

## 2021-06-18 RX ORDER — HYDROCODONE BITARTRATE AND ACETAMINOPHEN 5; 325 MG/1; MG/1
2 TABLET ORAL PRN
Status: DISCONTINUED | OUTPATIENT
Start: 2021-06-18 | End: 2021-06-18 | Stop reason: HOSPADM

## 2021-06-18 RX ORDER — LIDOCAINE HYDROCHLORIDE 20 MG/ML
INJECTION, SOLUTION EPIDURAL; INFILTRATION; INTRACAUDAL; PERINEURAL PRN
Status: DISCONTINUED | OUTPATIENT
Start: 2021-06-18 | End: 2021-06-18 | Stop reason: SDUPTHER

## 2021-06-18 RX ORDER — MIDAZOLAM HYDROCHLORIDE 1 MG/ML
2 INJECTION INTRAMUSCULAR; INTRAVENOUS
Status: COMPLETED | OUTPATIENT
Start: 2021-06-18 | End: 2021-06-18

## 2021-06-18 RX ORDER — PROPOFOL 10 MG/ML
INJECTION, EMULSION INTRAVENOUS PRN
Status: DISCONTINUED | OUTPATIENT
Start: 2021-06-18 | End: 2021-06-18 | Stop reason: SDUPTHER

## 2021-06-18 RX ORDER — MORPHINE SULFATE 4 MG/ML
4 INJECTION, SOLUTION INTRAMUSCULAR; INTRAVENOUS
Status: DISCONTINUED | OUTPATIENT
Start: 2021-06-18 | End: 2021-06-19

## 2021-06-18 RX ORDER — CLONIDINE HYDROCHLORIDE 0.3 MG/1
0.3 TABLET ORAL NIGHTLY
Status: DISCONTINUED | OUTPATIENT
Start: 2021-06-18 | End: 2021-06-19

## 2021-06-18 RX ORDER — LIDOCAINE HYDROCHLORIDE 10 MG/ML
INJECTION, SOLUTION INFILTRATION; PERINEURAL
Status: DISCONTINUED | OUTPATIENT
Start: 2021-06-18 | End: 2021-06-18 | Stop reason: SDUPTHER

## 2021-06-18 RX ORDER — LIDOCAINE HYDROCHLORIDE 10 MG/ML
1 INJECTION, SOLUTION EPIDURAL; INFILTRATION; INTRACAUDAL; PERINEURAL
Status: DISCONTINUED | OUTPATIENT
Start: 2021-06-18 | End: 2021-06-18 | Stop reason: HOSPADM

## 2021-06-18 RX ORDER — SODIUM CHLORIDE 0.9 % (FLUSH) 0.9 %
10 SYRINGE (ML) INJECTION PRN
Status: DISCONTINUED | OUTPATIENT
Start: 2021-06-18 | End: 2021-06-18 | Stop reason: HOSPADM

## 2021-06-18 RX ORDER — SODIUM CHLORIDE 0.9 % (FLUSH) 0.9 %
10 SYRINGE (ML) INJECTION EVERY 12 HOURS SCHEDULED
Status: DISCONTINUED | OUTPATIENT
Start: 2021-06-18 | End: 2021-06-18 | Stop reason: HOSPADM

## 2021-06-18 RX ORDER — SODIUM CHLORIDE 9 MG/ML
25 INJECTION, SOLUTION INTRAVENOUS PRN
Status: DISCONTINUED | OUTPATIENT
Start: 2021-06-18 | End: 2021-06-19 | Stop reason: HOSPADM

## 2021-06-18 RX ORDER — OXYCODONE HYDROCHLORIDE 5 MG/1
10 TABLET ORAL EVERY 4 HOURS PRN
Status: DISCONTINUED | OUTPATIENT
Start: 2021-06-18 | End: 2021-06-19 | Stop reason: HOSPADM

## 2021-06-18 RX ORDER — ONDANSETRON 2 MG/ML
INJECTION INTRAMUSCULAR; INTRAVENOUS PRN
Status: DISCONTINUED | OUTPATIENT
Start: 2021-06-18 | End: 2021-06-18 | Stop reason: SDUPTHER

## 2021-06-18 RX ORDER — ONDANSETRON 2 MG/ML
4 INJECTION INTRAMUSCULAR; INTRAVENOUS
Status: COMPLETED | OUTPATIENT
Start: 2021-06-18 | End: 2021-06-18

## 2021-06-18 RX ORDER — PROMETHAZINE HYDROCHLORIDE 12.5 MG/1
12.5 TABLET ORAL 3 TIMES DAILY PRN
Qty: 14 TABLET | Refills: 0 | Status: SHIPPED | OUTPATIENT
Start: 2021-06-18 | End: 2021-06-25

## 2021-06-18 RX ORDER — PROPOFOL 10 MG/ML
INJECTION, EMULSION INTRAVENOUS CONTINUOUS PRN
Status: DISCONTINUED | OUTPATIENT
Start: 2021-06-18 | End: 2021-06-18 | Stop reason: SDUPTHER

## 2021-06-18 RX ORDER — FENTANYL CITRATE 50 UG/ML
25 INJECTION, SOLUTION INTRAMUSCULAR; INTRAVENOUS EVERY 5 MIN PRN
Status: COMPLETED | OUTPATIENT
Start: 2021-06-18 | End: 2021-06-18

## 2021-06-18 RX ORDER — SCOLOPAMINE TRANSDERMAL SYSTEM 1 MG/1
1 PATCH, EXTENDED RELEASE TRANSDERMAL ONCE
Status: DISCONTINUED | OUTPATIENT
Start: 2021-06-18 | End: 2021-06-19 | Stop reason: HOSPADM

## 2021-06-18 RX ORDER — LIDOCAINE HYDROCHLORIDE 10 MG/ML
INJECTION, SOLUTION INFILTRATION; PERINEURAL
Status: COMPLETED | OUTPATIENT
Start: 2021-06-18 | End: 2021-06-18

## 2021-06-18 RX ORDER — OXYCODONE HYDROCHLORIDE 5 MG/1
5 TABLET ORAL EVERY 4 HOURS PRN
Status: DISCONTINUED | OUTPATIENT
Start: 2021-06-18 | End: 2021-06-19 | Stop reason: HOSPADM

## 2021-06-18 RX ORDER — SODIUM CHLORIDE 9 MG/ML
25 INJECTION, SOLUTION INTRAVENOUS PRN
Status: DISCONTINUED | OUTPATIENT
Start: 2021-06-18 | End: 2021-06-18 | Stop reason: HOSPADM

## 2021-06-18 RX ORDER — PROMETHAZINE HYDROCHLORIDE 12.5 MG/1
12.5 TABLET ORAL EVERY 6 HOURS PRN
Status: DISCONTINUED | OUTPATIENT
Start: 2021-06-18 | End: 2021-06-19 | Stop reason: HOSPADM

## 2021-06-18 RX ORDER — SODIUM CHLORIDE 9 MG/ML
INJECTION, SOLUTION INTRAVENOUS CONTINUOUS
Status: DISCONTINUED | OUTPATIENT
Start: 2021-06-18 | End: 2021-06-18

## 2021-06-18 RX ORDER — SODIUM CHLORIDE, SODIUM LACTATE, POTASSIUM CHLORIDE, CALCIUM CHLORIDE 600; 310; 30; 20 MG/100ML; MG/100ML; MG/100ML; MG/100ML
INJECTION, SOLUTION INTRAVENOUS CONTINUOUS
Status: DISCONTINUED | OUTPATIENT
Start: 2021-06-18 | End: 2021-06-18

## 2021-06-18 RX ORDER — ACETAMINOPHEN 325 MG/1
650 TABLET ORAL EVERY 6 HOURS
Status: DISCONTINUED | OUTPATIENT
Start: 2021-06-18 | End: 2021-06-19 | Stop reason: HOSPADM

## 2021-06-18 RX ORDER — DEXTROAMPHETAMINE SACCHARATE, AMPHETAMINE ASPARTATE MONOHYDRATE, DEXTROAMPHETAMINE SULFATE AND AMPHETAMINE SULFATE 5; 5; 5; 5 MG/1; MG/1; MG/1; MG/1
20 CAPSULE, EXTENDED RELEASE ORAL EVERY MORNING
Status: DISCONTINUED | OUTPATIENT
Start: 2021-06-19 | End: 2021-06-19 | Stop reason: HOSPADM

## 2021-06-18 RX ORDER — ROCURONIUM BROMIDE 10 MG/ML
INJECTION, SOLUTION INTRAVENOUS PRN
Status: DISCONTINUED | OUTPATIENT
Start: 2021-06-18 | End: 2021-06-18 | Stop reason: SDUPTHER

## 2021-06-18 RX ORDER — MORPHINE SULFATE 2 MG/ML
2 INJECTION, SOLUTION INTRAMUSCULAR; INTRAVENOUS
Status: DISCONTINUED | OUTPATIENT
Start: 2021-06-18 | End: 2021-06-19

## 2021-06-18 RX ORDER — OXYCODONE HYDROCHLORIDE AND ACETAMINOPHEN 5; 325 MG/1; MG/1
1 TABLET ORAL EVERY 4 HOURS PRN
Qty: 42 TABLET | Refills: 0 | Status: SHIPPED | OUTPATIENT
Start: 2021-06-18 | End: 2021-06-25

## 2021-06-18 RX ORDER — PROPRANOLOL HYDROCHLORIDE 10 MG/1
10 TABLET ORAL PRN
Status: DISCONTINUED | OUTPATIENT
Start: 2021-06-18 | End: 2021-06-19 | Stop reason: HOSPADM

## 2021-06-18 RX ORDER — GLYCOPYRROLATE 1 MG/5 ML
SYRINGE (ML) INTRAVENOUS PRN
Status: DISCONTINUED | OUTPATIENT
Start: 2021-06-18 | End: 2021-06-18 | Stop reason: SDUPTHER

## 2021-06-18 RX ORDER — HYDROCODONE BITARTRATE AND ACETAMINOPHEN 5; 325 MG/1; MG/1
1 TABLET ORAL PRN
Status: DISCONTINUED | OUTPATIENT
Start: 2021-06-18 | End: 2021-06-18 | Stop reason: HOSPADM

## 2021-06-18 RX ORDER — SODIUM CHLORIDE 9 MG/ML
INJECTION, SOLUTION INTRAVENOUS CONTINUOUS
Status: DISCONTINUED | OUTPATIENT
Start: 2021-06-18 | End: 2021-06-19 | Stop reason: HOSPADM

## 2021-06-18 RX ORDER — DEXTROAMPHETAMINE SACCHARATE, AMPHETAMINE ASPARTATE, DEXTROAMPHETAMINE SULFATE AND AMPHETAMINE SULFATE 2.5; 2.5; 2.5; 2.5 MG/1; MG/1; MG/1; MG/1
10 TABLET ORAL DAILY PRN
Status: DISCONTINUED | OUTPATIENT
Start: 2021-06-18 | End: 2021-06-19 | Stop reason: HOSPADM

## 2021-06-18 RX ORDER — FENTANYL CITRATE 50 UG/ML
50 INJECTION, SOLUTION INTRAMUSCULAR; INTRAVENOUS EVERY 5 MIN PRN
Status: DISCONTINUED | OUTPATIENT
Start: 2021-06-18 | End: 2021-06-18 | Stop reason: HOSPADM

## 2021-06-18 RX ORDER — NEOSTIGMINE METHYLSULFATE 5 MG/5 ML
SYRINGE (ML) INTRAVENOUS PRN
Status: DISCONTINUED | OUTPATIENT
Start: 2021-06-18 | End: 2021-06-18 | Stop reason: SDUPTHER

## 2021-06-18 RX ORDER — ONDANSETRON 2 MG/ML
4 INJECTION INTRAMUSCULAR; INTRAVENOUS EVERY 6 HOURS PRN
Status: DISCONTINUED | OUTPATIENT
Start: 2021-06-18 | End: 2021-06-19 | Stop reason: HOSPADM

## 2021-06-18 RX ORDER — BUTALBITAL, ACETAMINOPHEN AND CAFFEINE 50; 325; 40 MG/1; MG/1; MG/1
1 TABLET ORAL EVERY 4 HOURS PRN
Status: DISCONTINUED | OUTPATIENT
Start: 2021-06-18 | End: 2021-06-19 | Stop reason: HOSPADM

## 2021-06-18 RX ORDER — TRANEXAMIC ACID 650 1/1
1300 TABLET ORAL 3 TIMES DAILY
Status: DISCONTINUED | OUTPATIENT
Start: 2021-06-18 | End: 2021-06-19 | Stop reason: HOSPADM

## 2021-06-18 RX ORDER — ROPIVACAINE HYDROCHLORIDE 5 MG/ML
INJECTION, SOLUTION EPIDURAL; INFILTRATION; PERINEURAL
Status: DISCONTINUED | OUTPATIENT
Start: 2021-06-18 | End: 2021-06-18 | Stop reason: SDUPTHER

## 2021-06-18 RX ORDER — ONDANSETRON 4 MG/1
4 TABLET, ORALLY DISINTEGRATING ORAL EVERY 8 HOURS PRN
Status: DISCONTINUED | OUTPATIENT
Start: 2021-06-18 | End: 2021-06-19 | Stop reason: HOSPADM

## 2021-06-18 RX ORDER — SODIUM CHLORIDE 0.9 % (FLUSH) 0.9 %
10 SYRINGE (ML) INJECTION EVERY 12 HOURS SCHEDULED
Status: DISCONTINUED | OUTPATIENT
Start: 2021-06-18 | End: 2021-06-19 | Stop reason: HOSPADM

## 2021-06-18 RX ORDER — BUPIVACAINE HYDROCHLORIDE 2.5 MG/ML
INJECTION, SOLUTION EPIDURAL; INFILTRATION; INTRACAUDAL
Status: COMPLETED | OUTPATIENT
Start: 2021-06-18 | End: 2021-06-18

## 2021-06-18 RX ADMIN — ROCURONIUM BROMIDE 50 MG: 10 INJECTION, SOLUTION INTRAVENOUS at 09:46

## 2021-06-18 RX ADMIN — FENTANYL CITRATE 50 MCG: 50 INJECTION, SOLUTION INTRAMUSCULAR; INTRAVENOUS at 12:10

## 2021-06-18 RX ADMIN — FENTANYL CITRATE 25 MCG: 50 INJECTION, SOLUTION INTRAMUSCULAR; INTRAVENOUS at 12:52

## 2021-06-18 RX ADMIN — MIDAZOLAM 2 MG: 1 INJECTION INTRAMUSCULAR; INTRAVENOUS at 08:57

## 2021-06-18 RX ADMIN — CEFAZOLIN 2000 MG: 1 INJECTION, POWDER, FOR SOLUTION INTRAMUSCULAR; INTRAVENOUS at 23:20

## 2021-06-18 RX ADMIN — ACETAMINOPHEN 650 MG: 325 TABLET ORAL at 21:43

## 2021-06-18 RX ADMIN — BUPIVACAINE 10 ML: 13.3 INJECTION, SUSPENSION, LIPOSOMAL INFILTRATION at 09:04

## 2021-06-18 RX ADMIN — LIDOCAINE HYDROCHLORIDE 1 ML: 10 INJECTION, SOLUTION INFILTRATION; PERINEURAL at 14:22

## 2021-06-18 RX ADMIN — SODIUM CHLORIDE, POTASSIUM CHLORIDE, SODIUM LACTATE AND CALCIUM CHLORIDE: 600; 310; 30; 20 INJECTION, SOLUTION INTRAVENOUS at 08:06

## 2021-06-18 RX ADMIN — ONDANSETRON 4 MG: 2 INJECTION INTRAMUSCULAR; INTRAVENOUS at 13:27

## 2021-06-18 RX ADMIN — OXYCODONE 10 MG: 5 TABLET ORAL at 21:43

## 2021-06-18 RX ADMIN — FENTANYL CITRATE 25 MCG: 50 INJECTION, SOLUTION INTRAMUSCULAR; INTRAVENOUS at 13:33

## 2021-06-18 RX ADMIN — LIDOCAINE HYDROCHLORIDE 5 ML: 10 INJECTION, SOLUTION INFILTRATION; PERINEURAL at 09:04

## 2021-06-18 RX ADMIN — CEFAZOLIN 2000 MG: 10 INJECTION, POWDER, FOR SOLUTION INTRAVENOUS at 09:56

## 2021-06-18 RX ADMIN — DEXAMETHASONE SODIUM PHOSPHATE 10 MG: 10 INJECTION INTRAMUSCULAR; INTRAVENOUS at 10:03

## 2021-06-18 RX ADMIN — BUPIVACAINE 133 MG: 13.3 INJECTION, SUSPENSION, LIPOSOMAL INFILTRATION at 09:01

## 2021-06-18 RX ADMIN — OXYCODONE 10 MG: 5 TABLET ORAL at 17:02

## 2021-06-18 RX ADMIN — Medication 0.8 MG: at 12:08

## 2021-06-18 RX ADMIN — SODIUM CHLORIDE: 9 INJECTION, SOLUTION INTRAVENOUS at 21:43

## 2021-06-18 RX ADMIN — BUPIVACAINE HYDROCHLORIDE 20 ML: 2.5 INJECTION, SOLUTION EPIDURAL; INFILTRATION; INTRACAUDAL; PERINEURAL at 09:04

## 2021-06-18 RX ADMIN — MORPHINE SULFATE 2 MG: 2 INJECTION, SOLUTION INTRAMUSCULAR; INTRAVENOUS at 23:43

## 2021-06-18 RX ADMIN — MORPHINE SULFATE 2 MG: 2 INJECTION, SOLUTION INTRAMUSCULAR; INTRAVENOUS at 19:40

## 2021-06-18 RX ADMIN — FENTANYL CITRATE 50 MCG: 50 INJECTION, SOLUTION INTRAMUSCULAR; INTRAVENOUS at 09:46

## 2021-06-18 RX ADMIN — FENTANYL CITRATE 25 MCG: 50 INJECTION, SOLUTION INTRAMUSCULAR; INTRAVENOUS at 13:43

## 2021-06-18 RX ADMIN — FENTANYL CITRATE 25 MCG: 50 INJECTION, SOLUTION INTRAMUSCULAR; INTRAVENOUS at 13:21

## 2021-06-18 RX ADMIN — ROPIVACAINE HYDROCHLORIDE 30 ML: 5 INJECTION, SOLUTION EPIDURAL; INFILTRATION; PERINEURAL at 14:22

## 2021-06-18 RX ADMIN — TRANEXAMIC ACID 1300 MG: 650 TABLET ORAL at 21:42

## 2021-06-18 RX ADMIN — CLONIDINE HYDROCHLORIDE 0.3 MG: 0.3 TABLET ORAL at 21:43

## 2021-06-18 RX ADMIN — LIDOCAINE HYDROCHLORIDE 100 MG: 20 INJECTION, SOLUTION EPIDURAL; INFILTRATION; INTRACAUDAL; PERINEURAL at 09:46

## 2021-06-18 RX ADMIN — ACETAMINOPHEN 650 MG: 325 TABLET ORAL at 15:32

## 2021-06-18 RX ADMIN — SODIUM CHLORIDE: 9 INJECTION, SOLUTION INTRAVENOUS at 15:36

## 2021-06-18 RX ADMIN — BISACODYL 5 MG: 5 TABLET, COATED ORAL at 15:33

## 2021-06-18 RX ADMIN — Medication 4 MG: at 12:08

## 2021-06-18 RX ADMIN — FENTANYL CITRATE 25 MCG: 50 INJECTION, SOLUTION INTRAMUSCULAR; INTRAVENOUS at 14:02

## 2021-06-18 RX ADMIN — SODIUM CHLORIDE: 9 INJECTION, SOLUTION INTRAVENOUS at 13:01

## 2021-06-18 RX ADMIN — ONDANSETRON 4 MG: 2 INJECTION, SOLUTION INTRAMUSCULAR; INTRAVENOUS at 12:08

## 2021-06-18 RX ADMIN — CEFAZOLIN 2000 MG: 1 INJECTION, POWDER, FOR SOLUTION INTRAMUSCULAR; INTRAVENOUS at 15:32

## 2021-06-18 RX ADMIN — PROPOFOL 200 MG: 10 INJECTION, EMULSION INTRAVENOUS at 09:46

## 2021-06-18 RX ADMIN — Medication 500 ML: at 14:25

## 2021-06-18 RX ADMIN — PROPOFOL 20 MCG/KG/MIN: 10 INJECTION, EMULSION INTRAVENOUS at 09:52

## 2021-06-18 ASSESSMENT — PULMONARY FUNCTION TESTS
PIF_VALUE: 0
PIF_VALUE: 21
PIF_VALUE: 15
PIF_VALUE: 2
PIF_VALUE: 20
PIF_VALUE: 20
PIF_VALUE: 21
PIF_VALUE: 19
PIF_VALUE: 3
PIF_VALUE: 15
PIF_VALUE: 18
PIF_VALUE: 20
PIF_VALUE: 4
PIF_VALUE: 20
PIF_VALUE: 11
PIF_VALUE: 20
PIF_VALUE: 21
PIF_VALUE: 20
PIF_VALUE: 8
PIF_VALUE: 18
PIF_VALUE: 20
PIF_VALUE: 21
PIF_VALUE: 20
PIF_VALUE: 23
PIF_VALUE: 20
PIF_VALUE: 19
PIF_VALUE: 21
PIF_VALUE: 20
PIF_VALUE: 20
PIF_VALUE: 18
PIF_VALUE: 20
PIF_VALUE: 20
PIF_VALUE: 21
PIF_VALUE: 20
PIF_VALUE: 20
PIF_VALUE: 19
PIF_VALUE: 20
PIF_VALUE: 19
PIF_VALUE: 20
PIF_VALUE: 18
PIF_VALUE: 1
PIF_VALUE: 1
PIF_VALUE: 20
PIF_VALUE: 21
PIF_VALUE: 20
PIF_VALUE: 20
PIF_VALUE: 21
PIF_VALUE: 21
PIF_VALUE: 20
PIF_VALUE: 21
PIF_VALUE: 15
PIF_VALUE: 20
PIF_VALUE: 19
PIF_VALUE: 16
PIF_VALUE: 23
PIF_VALUE: 20
PIF_VALUE: 4
PIF_VALUE: 20
PIF_VALUE: 20
PIF_VALUE: 16
PIF_VALUE: 20
PIF_VALUE: 21
PIF_VALUE: 20
PIF_VALUE: 21
PIF_VALUE: 20
PIF_VALUE: 19
PIF_VALUE: 23
PIF_VALUE: 20
PIF_VALUE: 21
PIF_VALUE: 20
PIF_VALUE: 21
PIF_VALUE: 19
PIF_VALUE: 20
PIF_VALUE: 21
PIF_VALUE: 20
PIF_VALUE: 3
PIF_VALUE: 20
PIF_VALUE: 21
PIF_VALUE: 20
PIF_VALUE: 0
PIF_VALUE: 21
PIF_VALUE: 20
PIF_VALUE: 19
PIF_VALUE: 20
PIF_VALUE: 18
PIF_VALUE: 20
PIF_VALUE: 15
PIF_VALUE: 20
PIF_VALUE: 21
PIF_VALUE: 21
PIF_VALUE: 20
PIF_VALUE: 19
PIF_VALUE: 19
PIF_VALUE: 20
PIF_VALUE: 15
PIF_VALUE: 18
PIF_VALUE: 19
PIF_VALUE: 20
PIF_VALUE: 20
PIF_VALUE: 19
PIF_VALUE: 25
PIF_VALUE: 20
PIF_VALUE: 21
PIF_VALUE: 19
PIF_VALUE: 19
PIF_VALUE: 20
PIF_VALUE: 1
PIF_VALUE: 20
PIF_VALUE: 20
PIF_VALUE: 21
PIF_VALUE: 20
PIF_VALUE: 21
PIF_VALUE: 15
PIF_VALUE: 20
PIF_VALUE: 0
PIF_VALUE: 21
PIF_VALUE: 20
PIF_VALUE: 1
PIF_VALUE: 16
PIF_VALUE: 20
PIF_VALUE: 20
PIF_VALUE: 21
PIF_VALUE: 19
PIF_VALUE: 21
PIF_VALUE: 21
PIF_VALUE: 20
PIF_VALUE: 5
PIF_VALUE: 20
PIF_VALUE: 1
PIF_VALUE: 20
PIF_VALUE: 21
PIF_VALUE: 21
PIF_VALUE: 20
PIF_VALUE: 15
PIF_VALUE: 21
PIF_VALUE: 20
PIF_VALUE: 0
PIF_VALUE: 20

## 2021-06-18 ASSESSMENT — PAIN - FUNCTIONAL ASSESSMENT
PAIN_FUNCTIONAL_ASSESSMENT: ACTIVITIES ARE NOT PREVENTED
PAIN_FUNCTIONAL_ASSESSMENT: PREVENTS OR INTERFERES SOME ACTIVE ACTIVITIES AND ADLS
PAIN_FUNCTIONAL_ASSESSMENT: 0-10
PAIN_FUNCTIONAL_ASSESSMENT: PREVENTS OR INTERFERES SOME ACTIVE ACTIVITIES AND ADLS
PAIN_FUNCTIONAL_ASSESSMENT: ACTIVITIES ARE NOT PREVENTED
PAIN_FUNCTIONAL_ASSESSMENT: ACTIVITIES ARE NOT PREVENTED
PAIN_FUNCTIONAL_ASSESSMENT: PREVENTS OR INTERFERES SOME ACTIVE ACTIVITIES AND ADLS

## 2021-06-18 ASSESSMENT — PAIN DESCRIPTION - LOCATION
LOCATION: KNEE

## 2021-06-18 ASSESSMENT — PAIN DESCRIPTION - ORIENTATION
ORIENTATION: RIGHT

## 2021-06-18 ASSESSMENT — PAIN DESCRIPTION - PROGRESSION
CLINICAL_PROGRESSION: NOT CHANGED
CLINICAL_PROGRESSION: GRADUALLY WORSENING
CLINICAL_PROGRESSION: RAPIDLY IMPROVING
CLINICAL_PROGRESSION: NOT CHANGED
CLINICAL_PROGRESSION: GRADUALLY WORSENING

## 2021-06-18 ASSESSMENT — PAIN DESCRIPTION - DESCRIPTORS
DESCRIPTORS: ACHING
DESCRIPTORS: ACHING;DISCOMFORT
DESCRIPTORS: PATIENT UNABLE TO DESCRIBE
DESCRIPTORS: OTHER (COMMENT)

## 2021-06-18 ASSESSMENT — PAIN DESCRIPTION - FREQUENCY
FREQUENCY: CONTINUOUS
FREQUENCY: INTERMITTENT
FREQUENCY: CONTINUOUS

## 2021-06-18 ASSESSMENT — PAIN SCALES - GENERAL
PAINLEVEL_OUTOF10: 7
PAINLEVEL_OUTOF10: 5
PAINLEVEL_OUTOF10: 2
PAINLEVEL_OUTOF10: 5
PAINLEVEL_OUTOF10: 2
PAINLEVEL_OUTOF10: 5
PAINLEVEL_OUTOF10: 2
PAINLEVEL_OUTOF10: 6
PAINLEVEL_OUTOF10: 1
PAINLEVEL_OUTOF10: 5
PAINLEVEL_OUTOF10: 0
PAINLEVEL_OUTOF10: 5
PAINLEVEL_OUTOF10: 7
PAINLEVEL_OUTOF10: 6

## 2021-06-18 ASSESSMENT — PAIN DESCRIPTION - ONSET
ONSET: ON-GOING
ONSET: GRADUAL
ONSET: ON-GOING
ONSET: GRADUAL

## 2021-06-18 ASSESSMENT — PAIN DESCRIPTION - PAIN TYPE
TYPE: SURGICAL PAIN

## 2021-06-18 ASSESSMENT — ENCOUNTER SYMPTOMS: SHORTNESS OF BREATH: 0

## 2021-06-18 NOTE — PROGRESS NOTES
Physical Therapy    Facility/Department: [facility]  Initial Assessment    NAME: Sadiq Soria  : 1995  MRN: [id number]    Date of Service: 2021    Discharge Recommendations:  Home with assist PRN   PT Equipment Recommendations  Equipment Needed: Yes  Mobility Devices: Walker;Crutches  Walker: Rolling  Crutches: Axillary    Assessment   Body structures, Functions, Activity limitations: Decreased functional mobility ; Decreased ADL status; Decreased balance  Assessment: Patient did very well for day of surgery, able to take a few steps with RW and maintain NWB on RLE. Recommend increase ambulation distance next visit and perform stairs. Paitent will benefit from skilled PT to improve gait, transfers. and balance. Prognosis: Good  Decision Making: Medium Complexity  PT Education: Goals;PT Role;Plan of Care;Home Exercise Program;Gait Training;Transfer Training;Precautions;General Safety  Patient Education: Patient educated to safety with transfers and gait, NWB RLE, and HEP: AP and QS. Patient demo good understanding. REQUIRES PT FOLLOW UP: Yes  Activity Tolerance  Activity Tolerance: Patient Tolerated treatment well       Patient Diagnosis(es): The encounter diagnosis was Post-op pain. has a past medical history of Anemia, Anxiety, Bipolar disorder ([facility] [address] Delta storage pool disease [facility]), Depression, Hypotension, Learning disability, Nausea & vomiting, Obesity, PONV (postoperative nausea and vomiting), Seizures ([address] and SOB (shortness of breath). has a past surgical history that includes Tonsillectomy and adenoidectomy; intrauterine device insertion; Cholecystectomy, laparoscopic (16); [place] tooth extraction; Endometrial ablation (); Tubal ligation (2018); hernia repair; eye surgery (Left); and Knee arthroscopy (Right, 2021).     Restrictions  Restrictions/Precautions  Restrictions/Precautions: Weight Bearing, General Precautions, Fall Risk  Required Braces or Orthoses?: Yes  Lower Extremity Weight Bearing Restrictions  Right Lower Extremity Weight Bearing: Non Weight Bearing  Required Braces or Orthoses  Right Lower Extremity Brace: Knee Brace  RLE Brace Type: Hinged knee brace locked in extension  Position Activity Restriction  Other position/activity restrictions: IV, infu block  Vision/Hearing  Vision: Impaired (glasses broken)  Vision Exceptions: Wears glasses for distance  Hearing: Within functional limits     Subjective  General  Chart Reviewed: Yes  Patient assessed for rehabilitation services?: Yes  Additional Pertinent Hx: Bipolar, Anemia, anxiety, depression, obesity, seizures  Response To Previous Treatment: Not applicable  Family / Caregiver Present: Yes (Mother)  Diagnosis: Recurrent dislocation right patella, right knee arthroscopy with lateral release and right tibial tubercle osteotomy 6/18/21  Follows Commands: Within Functional Limits  General Comment  Comments: RN reports patient medically appropriate for PT. Subjective  Subjective: Patient agreeable to PT, mother answering most questions. Patient reprots feeling tired and RLE still very numb.      Pre Treatment Pain Screening  Intervention List: Patient able to continue with treatment    Orientation  Orientation  Overall Orientation Status: Within Functional Limits  Social/Functional History  Social/Functional History  Lives With:  (Lives alone, but going to [facility], mom with have to go to work on Monday.)  Type of Home: House  Home Layout: Two level, Able to Live on Main level with bedroom/bathroom  Home Access: Stairs to enter without rails  Entrance Stairs - Number of Steps: 3 to 5 steps into home  Bathroom Shower/Tub: Tub/Shower unit  [employer]: Standard  Bathroom Equipment:  (None)  Home Equipment:  (Borrowing rollator)  ADL Assistance: Independent  Homemaking Assistance: Independent  Ambulation Assistance: Independent  Transfer Assistance: Independent  Active : No  Occupation: Full time employment  Type of occupation: Housekeeping and laundry  Additional Comments: Several falls in the last few months due to knee buckling  Cognition   Cognition  Overall Cognitive Status: WNL    Objective     Observation/Palpation  Posture: Good  Observation: Right LE hinged knee brace locked in extension, polar care, unfu block. Flat affect. AROM RLE (degrees)  RLE General AROM: Ankle and knee WFL, knee not assessed due to sx precautions  AROM LLE (degrees)  LLE AROM : WNL  AROM RUE (degrees)  RUE AROM : WNL  AROM LUE (degrees)  LUE AROM : WNL  Strength RLE  Comment: hip 3-/5, knee not assessed, ankle 2/5 (still very numb)  Strength LLE  Comment: 5/5  Strength RUE  Comment: 5/5  Strength LUE  Comment: 5/5  Tone RLE  RLE Tone:  (not assessed, numb from nerve block)  Tone LLE  LLE Tone: Normotonic  Motor Control  Gross Motor?: WFL  Sensation  Overall Sensation Status: Impaired (numb from knee down, altered sensation in thigh)  Bed mobility  Supine to Sit: Minimal assistance  Sit to Supine: Minimal assistance  Comment: MIn assist for RLE. verbal cues for safety and technique  Transfers  Sit to Stand: Minimal Assistance  Stand to sit: Minimal Assistance  Comment: RW, verbal cues for hand placement and technique to maintain NWB RLE. Fair follow through on hand placement, Patient able to maintain NWB with verbal and manual cues. Ambulation  Ambulation?: Yes  Ambulation 1  Device: Rolling Walker  Assistance: Minimal assistance  Quality of Gait: Patient given verbal cues and demo on how to maintain NWB with ambulation. Patient able to maintain NWB through out gait. Patient steady with RW.  Gait Deviations: Slow Dianelys;Decreased step length;Decreased step height;Decreased head and trunk rotation  Distance: 5 \"hops\" to top of bed     Balance  Sitting - Static: Good  Sitting - Dynamic: Good  Standing - Static: Good;- (RW)  Standing - Dynamic: Fair;+ (RW)  Exercises  Comments: Patient educated on AP and quad sets.  Patient able to perform on LLE, but RLE still too numb. Plan   Plan  Times per week: 2x/d, 7 d/wk  Current Treatment Recommendations: Strengthening, Balance Training, Functional Mobility Training, Transfer Training, Stair training, Gait Training, Endurance Training, Patient/Caregiver Education & Training, Home Exercise Program, Safety Education & Training  Safety Devices  Type of devices: All fall risk precautions in place, Gait belt, Nurse notified, Call light within reach, Left in bed, Bed alarm in place    OutComes Score    AM-PAC Score  AM-PAC Inpatient Mobility Raw Score : 15 (06/18/21 1600)  AM-PAC Inpatient T-Scale Score : 39.45 (06/18/21 1600)  Mobility Inpatient CMS 0-100% Score: 57.7 (06/18/21 1600)  Mobility Inpatient CMS G-Code Modifier : CK (06/18/21 1600)          Goals  Short term goals  Time Frame for Short term goals: 6 visits  Short term goal 1: Patient will be indep with bed mobility. Short term goal 2: Patient will be indep with transfers. Short term goal 3: Patient will amb 40 feet with RW indep while maintaining NWB RLE. Short term goal 4: Patient will negotiate 3-5 stairs with crutches and min assist (or be able to scoot up on buttocks). Short term goal 5: Patient will be indep with HEP.   Patient Goals   Patient goals : Pain control       Therapy Time   Individual Concurrent Group Co-treatment   Time In 1500         Time Out 1542         Minutes 42         Timed Code Treatment Minutes: [address] [facility] [address], PT

## 2021-06-18 NOTE — PROGRESS NOTES
Dr Ballesteros Oh at bedside to do peripheral nerve block, patient tolerated procedure well, vs documented

## 2021-06-18 NOTE — H&P
Interval H&P Note    Pt Name: Liborio Correa  MRN: [id number]  YOB: 1995  Date of evaluation: 6/18/2021      [x] I have reviewed the Orthopedic Surgery Progress Note by Dr. Carlota Flores dated 5/20/2021 in epic which meets the criteria for an Interval History and Physical note which is attached below. [x] I have examined  Liborio Correa  There are no changes to the patient who is scheduled for a right knee arthroscopy with AMZ and MPFL and arthroscopic right knee ACL repair by Dr. Carlota Flores for right patella dislocation. The patient denies new health changes, fever, chills, wheezing, cough, increased SOB, chest pain, open sores or wounds. Denies hx diabetes. Last Vitamin D 6/11/2021. Vital signs: /65   Pulse 62   Temp 96.7 °F (35.9 °C) (Temporal)   Resp 16   Ht 5' 3\" (1.6 m)   Wt 193 lb (87.5 kg)   SpO2 98%   BMI 34.19 kg/m²     Allergies:  Latex, Poison ivy extract, and Sulfa antibiotics    Medications:    Prior to Admission medications    Medication Sig Start Date End Date Taking? Authorizing Provider   ondansetron (ZOFRAN ODT) 4 MG disintegrating tablet Take 1 tablet by mouth every 8 hours as needed for Nausea 6/3/21 6/3/22  GIO Tenorio CNP   butalbital-acetaminophen-caffeine (FIORICET, ESGIC) -06 MG per tablet Take 1 tablet by mouth every 4 hours as needed for Headaches 6/3/21 6/3/22  GIO Tenorio CNP   amphetamine-dextroamphetamine (ADDERALL XR) 20 MG extended release capsule Take 20 mg by mouth every morning. Historical Provider, MD   amphetamine-dextroamphetamine (ADDERALL, 10MG,) 10 MG tablet Take 10 mg by mouth daily as needed (if needed at work).   4/22/21   Historical Provider, MD   cloNIDine (CATAPRES) 0.1 MG tablet Take 0.3 mg by mouth nightly  5/4/21   Historical Provider, MD   propranolol (INDERAL) 10 MG tablet take 1 tablet by mouth once daily if needed 5/4/21   Historical Provider, MD   polyethylene glycol (GLYCOLAX) 17 GM/SCOOP powder [facility]  [address] [place] [address]  Dept: [phone]  Dept Fax: [phone]                                                              Bilateral Knee - New Patient      Subjective:           Chief Complaint   Patient presents with    New Patient       Bilateral Knee Pain R>L, x2 years      HPI:      Margi Chacon presents today with bilateral knee pain. The patient works at [employer]. The pain has been present for 2 years. The right knee hurts worse than the left. The patient has had 3 dislocations with the right knee. The patient has tried ES Tylenol, Motrin at a prescription dose, Naproxen, ice/heat, and copper compression sleeves with mild improvement. The pain is now described as Arn Leak, Thermon Banister and Vivia Actis. There is pain with weight bearing. The knees have swelled. There is painful popping and clicking. The knee has caught or locked up. The knees have given out. It is stiff upon arising from sitting. It is painful to go up and down stairs and sit for a prolonged time. Going down the stairs is worse than going up. The patient has not had a cortisone injection. The patient has not tried a lubrication injection. The patient has not tried physical therapy. The patient has not had surgery. The patient states that she will fall down her stairs because of her knees. The patient has lost 100 pounds. ROS:   Review of Systems   Constitutional: Positive for activity change. Negative for appetite change, fatigue and fever. Respiratory: Negative for apnea, cough, chest tightness and shortness of breath. Cardiovascular: Negative for chest pain, palpitations and leg swelling. Gastrointestinal: Negative for abdominal distention, abdominal pain, constipation, diarrhea, nausea and vomiting. Genitourinary: Negative for difficulty urinating, dysuria and hematuria. Musculoskeletal: Positive for arthralgias and gait problem.  Negative for joint swelling and myalgias. Skin: Negative for color change and rash. Neurological: Negative for dizziness, weakness, numbness and headaches. Psychiatric/Behavioral: Negative for sleep disturbance. 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[facility] [address]      Depression      Obesity      Seizures ([facility] [address]              Past Surgical History:    Past Surgical History         Past Surgical History:   Procedure Laterality Date    CHOLECYSTECTOMY, LAPAROSCOPIC   5-20-16    ENDOMETRIAL ABLATION   2018    EYE SURGERY Left       cyst removed    HERNIA REPAIR        INTRAUTERINE DEVICE INSERTION         removed July 16, 2015    TONSILLECTOMY AND ADENOIDECTOMY        TUBAL LIGATION   2018    WISDOM TOOTH EXTRACTION                CurrentMedications:   Current Facility-Administered Medications          Current Outpatient Medications   Medication Sig Dispense Refill    vitamin D (ERGOCALCIFEROL) 1.25 MG (89907 UT) CAPS capsule Take 1 capsule by mouth once a week 12 capsule 1    ondansetron (ZOFRAN ODT) 4 MG disintegrating tablet Take 1 tablet by mouth every 8 hours as needed for Nausea 20 tablet 0    butalbital-acetaminophen-caffeine (FIORICET, ESGIC) -40 MG per tablet Take 1 tablet by mouth every 4 hours as needed for Headaches 20 tablet 0    albuterol sulfate  (90 Base) MCG/ACT inhaler Inhale 2 puffs into the lungs every 6 hours as needed          No current facility-administered medications for this visit.             Allergies:    Latex and Sulfa antibiotics     Social History:   Social History   Social History            Socioeconomic History    Marital status: Single       Spouse name: None    Number of children: None    Years of education: None    Highest education level: None   Occupational History    None   Tobacco Use    Smoking status: Current Some Day Smoker       Packs/day: 1.00       Years: 5.00       Pack years: 5.00       Types: Cigarettes       Last attempt to quit: 2016       Years since quittin.2    Smokeless tobacco: Never Used   Substance and Sexual Activity    Alcohol use: No       Alcohol/week: 0.0 standard drinks       Comment: 2016-stopped drinking    Drug use: Yes       Frequency: 7.0 times per week       Types: Marijuana       Comment: 2016-daily marijuana use    Sexual activity: Yes       Partners: Male       Birth control/protection: Condom   Other Topics Concern    None   Social History Narrative    None      Social Determinants of Health          Financial Resource Strain: Low Risk     Difficulty of Paying Living Expenses: Not hard at all   Food Insecurity: No Food Insecurity    Worried About Running Out of Food in the Last Year: Never true    Jass of Food in the Last Year: Never true   Transportation Needs: Unmet Transportation Needs    Lack of Transportation (Medical): Yes    Lack of Transportation (Non-Medical): Yes   Physical Activity:     Days of Exercise per Week:     Minutes of Exercise per Session:    Stress:     Feeling of Stress :    Social Connections:     Frequency of Communication with Friends and Family:     Frequency of Social Gatherings with Friends and Family:     Attends Bahai Services:     Active Member of Clubs or Organizations:     Attends Club or Organization Meetings:     Marital Status:    Intimate Partner Violence:     Fear of Current or Ex-Partner:     Emotionally Abused:     Physically Abused:     Sexually Abused:             Family History:  Family History   Family History   Problem Relation Age of Onset    Diabetes Maternal Grandmother      Heart Disease Maternal Grandmother              Vitals:   /79   Pulse 61   Resp 11   Ht 5' 3\" (1.6 m)   Wt 193 lb (87.5 kg)   BMI 34.19 kg/m²  Body mass index is 34.19 kg/m².   Physical Examination:      Orthopedics: GENERAL: Alert and oriented X3 in no acute distress. SKIN: Intact without lesions or ulcerations. NEURO: Intact to sensory and motor testing. VASC: Capillary refill is less than 3 seconds. KNEE EXAM     LOCATION: Bilateral Knee  GEN: Alert and oriented X 3, in no acute distress. GAIT: The patient's gait was observed while entering the exam room and was noted to be antalgic. The extremity is in valgus alignment. SKIN: Intact without rashes, lesions, or ulcerations. No obvious deformity or swelling. NEURO: The patient responds to light touch throughout bilateral LE. Patellar and Achilles reflexes are 2/4. VASC: The bilateral LE is neurovascularly intact with 2/4 DP and 2/4 PT pulses. Brisk capillary refill. ROM: -20/140 degrees. 20 degrees of hyperextension. There is no effusion. Positive marked J Sign. Grasshopper sign of both patellas. 17 degrees of valgus alignment. MUSC: Good quad tone. LIGAMENT: Lachman's test is Negative with Good endpoint. Anterior drawer Negative. Posterior drawer Negative. There is No varus instability at 0 degrees and No varus instability at 30 degrees. There is No valgus instability at 0 degrees and No valgus instability at 30 degrees. Generalized collateral ligament laxity. SPECIAL: Bonita test is negative with no clunks, (+) patellofemoral crepitation, and (+) pain medially. PALP: There is no joint line pain bilaterally. Positive patella apprehension. Assessment:      1. Dislocation of right patella, initial encounter    2. Recurrent dislocation of patella, right    3. Patellofemoral syndrome of left knee       Procedures:    Procedure: no  Radiology:   X-rays taken today were reviewed with the patient.   XR KNEE LEFT (1-2 VIEWS)     Result Date: 5/21/2021  KNEE X-RAY   4 views of the bilateral knees including AP, bilateral tunnel, and lateral in the upright position, and skyline views reveal lateral patella subluxation, patella andreas with trochlear dysplasia arthritis and possible cartilage damage. However, once we have the CT scan completed, I explained to them that I will take a look at the results and let them know if we need to move forward with a MRI test. If we need the MRI test, then I informed the patient that I will go over everything at her pre-operative discussion appointment. The patient has elected in proceeding with a tibial osteotomy surgery with the right knee. The risks/benefits/alternatives and potential complications have been discussed with the patient. We also had a long discussion regarding the procedure itself and expectation of the recovery. All questions and concerns with addressed. Patient was instructed to call our office with any question or concerns prior to the procedure occurs. I spent 15-20 minutes face to face time with the patient during today's encounter. A physical therapy prescription was not given. Patient should return to the clinic one week before surgery for her pre-operative discussion to follow up with Raudel Lopes D.O. . The patient will call the office immediately with any problems. Encounter Medications    No orders of the defined types were placed in this encounter.                 Orders Placed This Encounter   Procedures    CT KNEE RIGHT WO CONTRAST       CT with 3D reconstruction       Standing Status:   Future       Standing Expiration Date:   5/21/2022       Scheduling Instructions:         CT with 3D reconstruction       Order Specific Question:   Reason for exam:       Answer:   pre op planning, needs a TT and TG distance       Order Specific Question:   Reason for exam:       Answer:   3D reconstruction         Nikhil JAIME, MS, AT, ATC, am scribing for and in the presence of Raudel Lopes D.O.. 5/23/2021  7:57 PM           Electronically signed by Joshua Kasper DO, on 5/23/2021 at 7:57 PM               IRaudel DO, have personally seen this patient and I have reviewed the CC, PMH, FHX and Social

## 2021-06-18 NOTE — ANESTHESIA PRE PROCEDURE
5/27/21 6/17/21  Javier Elliott MD   albuterol sulfate  (90 Base) MCG/ACT inhaler Inhale 2 puffs into the lungs every 6 hours as needed 2/13/20   Historical Provider, MD       Current medications:    Current Facility-Administered Medications   Medication Dose Route Frequency Provider Last Rate Last Admin    lactated ringers infusion   Intravenous Continuous Kt Greer  mL/hr at 06/18/21 0806 New Bag at 06/18/21 0806    sodium chloride flush 0.9 % injection 10 mL  10 mL Intravenous 2 times per day Kt Greer MD        sodium chloride flush 0.9 % injection 10 mL  10 mL Intravenous PRN Kt Greer MD        0.9 % sodium chloride infusion  25 mL Intravenous PRN Kt Greer MD        lidocaine PF 1 % injection 1 mL  1 mL Intradermal Once PRN Kt Greer MD        ceFAZolin (ANCEF) 2000 mg in dextrose 5 % 50 mL IVPB  2,000 mg Intravenous Once Elham Post, DO           Allergies:     Allergies   Allergen Reactions    Latex Rash    Poison Ivy Extract Rash     \"was one big welt\"    Sulfa Antibiotics Anaphylaxis       Problem List:    Patient Active Problem List   Diagnosis Code    Migraine headache G43.909    Vaginal bleeding N93.9    Anxiety F41.9    Vitamin D insufficiency E55.9    PTSD (post-traumatic stress disorder) F43.10    Excessive and frequent menstruation N92.0    Adult body mass index 40 and over [id number]    Attention deficit disorder without hyperactivity F98.8    Calculus of gallbladder with chronic cholecystitis without obstruction K80.10    Developmental disorder of scholastic skill F81.9    Endometriosis N80.9    Esophageal reflux K21.9    Observation of other suspected mental condition Z03.89    Pelvic congestion N94.89    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    Platelet function defect ([facility] D69.1    Morbid obesity ([facility] E66.01       Past Medical History:        Diagnosis Date    Anemia     Anxiety     is treated with Catapres and Inderal; anxiety causes her to pick at her skin    Bipolar disorder ([facility]     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[facility]     Depression     Hypotension     side effect of Inderal and Catapres taken for anxiety    Learning disability     Non-verbal learning disability, has no real concept of time, money, misinterprets body language    Nausea & vomiting     been going on since 2020, is being worked up by Dr Humberto Babcock Obesity     PONV (postoperative nausea and vomiting)     usually has a scopalamine patch for surgery    Seizures ([facility] [facility]     had one seizure in early adolescence none since    SOB (shortness of breath)     with activity at times, unable to climb a flight of stairs without SOB       Past Surgical History:        Procedure Laterality Date    CHOLECYSTECTOMY, LAPAROSCOPIC  16    ENDOMETRIAL ABLATION  2018    EYE SURGERY Left     cyst removed left eyelid    HERNIA REPAIR      INTRAUTERINE DEVICE INSERTION      removed 2015    TONSILLECTOMY AND ADENOIDECTOMY      TUBAL LIGATION  2018    WISDOM TOOTH EXTRACTION         Social History:    Social History     Tobacco Use    Smoking status: Former Smoker     Packs/day: 1.00     Years: 5.00     Pack years: 5.00     Types: Cigarettes     Quit date: 2021     Years since quittin.3    Smokeless tobacco: Never Used   Substance Use Topics    Alcohol use: No     Alcohol/week: 0.0 standard drinks     Comment: 2016-stopped drinking                                Counseling given: Not Answered      Vital Signs (Current):   Vitals:    21 0739 21 0740 21 0857   BP:  109/65 114/65   Pulse:  62 62   Resp:  16 16   Temp:  96.7 °F (35.9 °C)    TempSrc:  Temporal    SpO2:  98% 100%   Weight: 193 lb (87.5 kg)     Height: 5' 3\" (1.6 m)                                                BP Readings from Last 3 Encounters:   21 114/65   21 111/72   21 98/63       NPO Status: Time of last liquid consumption: 0 Time of last solid consumption: 2350                        Date of last liquid consumption: 06/17/21                        Date of last solid food consumption: 06/17/21    BMI:   Wt Readings from Last 3 Encounters:   06/18/21 193 lb (87.5 kg)   06/17/21 193 lb (87.5 kg)   06/17/21 190 lb (86.2 kg)     Body mass index is 34.19 kg/m². CBC:   Lab Results   Component Value Date    WBC 8.9 05/28/2021    RBC 4.39 05/28/2021    HGB 13.8 05/28/2021    HCT 44.2 05/28/2021    .7 05/28/2021    RDW 13.5 05/28/2021     05/28/2021       CMP:   Lab Results   Component Value Date     05/28/2021    K 4.2 05/28/2021     05/28/2021    CO2 24 05/28/2021    BUN 15 05/28/2021    CREATININE 0.55 05/28/2021    GFRAA >60 05/28/2021    LABGLOM >60 05/28/2021    GLUCOSE 73 05/28/2021    PROT 6.8 04/08/2021    CALCIUM 9.5 05/28/2021    BILITOT 0.5 04/08/2021    ALKPHOS 69 04/08/2021    AST 20 04/08/2021    ALT 24 04/08/2021       POC Tests: No results for input(s): POCGLU, POCNA, POCK, POCCL, POCBUN, POCHEMO, POCHCT in the last 72 hours. Coags:   Lab Results   Component Value Date    PROTIME 12.8 05/28/2021    INR 1.0 05/28/2021    APTT 30.1 05/28/2021       HCG (If Applicable):   Lab Results   Component Value Date    PREGTESTUR  03/11/2019      Comment:      neg    HCG NEGATIVE 06/18/2021        ABGs: No results found for: PHART, PO2ART, NQW5GDH, OTR7KAP, BEART, I5XQJIIG     Type & Screen (If Applicable):  No results found for: LABABO, LABRH    Drug/Infectious Status (If Applicable):  No results found for: HIV, HEPCAB    COVID-19 Screening (If Applicable): No results found for: COVID19        Anesthesia Evaluation     history of anesthetic complications: PONV.   Airway: Mallampati: I  TM distance: >3 FB   Neck ROM: full  Mouth opening: > = 3 FB Dental:          Pulmonary:       (-) shortness of breath                           Cardiovascular:        (-)  angina                Neuro/Psych:

## 2021-06-18 NOTE — ANESTHESIA POSTPROCEDURE EVALUATION
Department of Anesthesiology  Postprocedure Note    Patient: Margi Chacon  MRN: [id number]  YOB: 1995  Date of evaluation: 6/18/2021  Time:  2:23 PM     Procedure Summary     Date: 06/18/21 Room / Location: [facility] OR [address] [place] [address]    Anesthesia Start: 5691 Anesthesia Stop: 8909    Procedure: RIGHT KNEE ARTHROSCOPY WITH DEBRIDEMENT AND LATERAL RELEASE, TIBIAL TUBERCLE OSTEOTOMY (Right Knee) Diagnosis: (DX RIGHT PATELLA DISLOCATION)    Surgeons: Rani Godinez DO Responsible Provider: Nohemy Dale MD    Anesthesia Type: general ASA Status: 3          Anesthesia Type: general    John Phase I: John Score: 10    John Phase II:      Last vitals: Reviewed and per EMR flowsheets.        Anesthesia Post Evaluation    Complications: no

## 2021-06-18 NOTE — PROGRESS NOTES
Pt admitted to room 2021 per bed in stable condition from PACU  Oriented to room and surroundings  Bed in lowest position, wheels locked, 2/4 side rails up  Call light in reach, room free of clutter, adequate lighting provided  Denies any further questions at this time  Instructed to call out with any questions/concerns/new onset of pain and/or n/v   White board updated  Continue to monitor with hourly rounding  STAY WITH ME protocol initiated   Bed alarm on/Fall Risk signs in place/Fall risk sticker to wrist band  Non-skid socks on/at bedside  MEDICATION EDUCATION SHEET in place for patient/family to view & ask questions.

## 2021-06-18 NOTE — CONSULTS
[facility]  Office: [address], DO, Kait Alvarez, DO, Carri Ramos, DO, Bocuhra Pollard Blood, DO, Jolene Gan MD, Brandy Vasquez MD, Court Krishnan MD, Dillon Alves MD, Lindsey Salmeron MD, Donato Shepherd MD, Day Coats MD, Meghann Jacinto MD, Ange Fabian DO, Shahrzad Hooper MD, Harper Loera DO, Otilia Blackwell MD,  Soumya Castillo DO, Salina Chapa MD, Joe Hussein MD, London Christian MD, Monika Main MD, Pepito Los, [facility], [facility], [place]), [facility], Marie Kent, CNP, Isiah Dolan, CNS, Richar Henson, CNP, Asim Archibald, CNP, Cr Elizondo, CNP, Marquez Aguirre, CNP, Jose Moreno, CNP, Rocio Acosta PA-C, Vira Nunes [facility], Eligio Ureña, [facility], [facility], CNP, Cindy Farrell, CNP, Reza Zhang, CNP, Chana Hawkins, CNP, Linda Sellers [facility] / HISTORY AND PHYSICAL EXAMINATION            Date:   6/18/2021  Patient name:  Tigre Garcia  Date of admission:  6/18/2021  7:29 AM  MRN:   [id number]  Account:  [de-identified]  YOB: 1995  PCP:    GIO Keane CNP  Room:   2021/2021-01  Code Status:    Full Code    Physician Requesting Consult: Cash Nunes DO    Reason for Consult: Assistance with medical management    Chief Complaint:     Postop knee, right    History Obtained From:     patient, mother    History of Present Illness:     Patient reports to the hospital for scheduled knee surgery. Internal medicine has been requested to consult on the case for management of chronic medical conditions. Patient has extensive mental health history. Patient's mother reports that she has been stable on her current oral regiment.   Patient also has endorsed a 100 pound weight loss over the past 1 year with recurrent vomiting every night between 2 and 3 AM.  Patient has had an appointment with a gastroenterologist and is scheduled to have EGD and colonoscopy the first week of July. Mother reports that Zofran has been ineffective at alleviating the patient's symptoms. Postoperatively the patient's pain is well controlled. Postoperative dressing and brace are in place without signs of infiltration, hemorrhage, induration. CMS is intact distally. From internal medicine standpoint this patient can be discharged at their discretion. Patient has outpatient follow-up already scheduled with GI for her weight loss and recurrent vomiting. Patient is well-established with PCP and her mental health is reported as stable on current regiment.     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[address]     Depression     Hypotension     side effect of Inderal and Catapres taken for anxiety    Learning disability     Non-verbal learning disability, has no real concept of time, money, misinterprets body language    Nausea & vomiting     been going on since June/July 2020, is being worked up by Dr Dillon Garcia Obesity     PONV (postoperative nausea and vomiting)     usually has a scopalamine patch for surgery    Seizures ([address]     had one seizure in early adolescence none since    SOB (shortness of breath)     with activity at times, unable to climb a flight of stairs without SOB        Past Surgical History:     Past Surgical History:   Procedure Laterality Date    CHOLECYSTECTOMY, LAPAROSCOPIC  5-20-16    ENDOMETRIAL ABLATION  2018    EYE SURGERY Left     cyst removed left eyelid    HERNIA REPAIR      INTRAUTERINE DEVICE INSERTION      removed July 16, 2015    KNEE ARTHROSCOPY Right 6/18/2021    RIGHT KNEE ARTHROSCOPY WITH DEBRIDEMENT AND LATERAL RELEASE, TIBIAL TUBERCLE OSTEOTOMY performed by Haley Ureña DO at [address]   [address] [place] [address] EXTRACTION          Medications Prior to Admission:     Prior to Admission medications    Medication Sig Start Date End Date Taking? Authorizing Provider   tranexamic acid (LYSTEDA) 650 MG TABS tablet Take 1,300 mg by mouth 3 times daily   Yes Historical Provider, MD   oxyCODONE-acetaminophen (PERCOCET) 5-325 MG per tablet Take 1 tablet by mouth every 4 hours as needed for Pain for up to 7 days. Intended supply: 7 days. Take lowest dose possible to manage pain 6/18/21 6/25/21 Yes Fatmata Jason,    ondansetron (ZOFRAN ODT) 4 MG disintegrating tablet Take 1 tablet by mouth every 8 hours as needed for Nausea 6/3/21 6/3/22 Yes GIO Spears CNP   butalbital-acetaminophen-caffeine (FIORICET, ESGIC) -40 MG per tablet Take 1 tablet by mouth every 4 hours as needed for Headaches 6/3/21 6/3/22 Yes GIO Spears CNP   amphetamine-dextroamphetamine (ADDERALL XR) 20 MG extended release capsule Take 20 mg by mouth every morning. Yes Historical Provider, MD   amphetamine-dextroamphetamine (ADDERALL, 10MG,) 10 MG tablet Take 10 mg by mouth daily as needed (if needed at work). 4/22/21  Yes Historical Provider, MD   cloNIDine (CATAPRES) 0.1 MG tablet Take 0.3 mg by mouth nightly  5/4/21  Yes Historical Provider, MD   propranolol (INDERAL) 10 MG tablet take 1 tablet by mouth once daily if needed 5/4/21  Yes Historical Provider, MD   vitamin D (ERGOCALCIFEROL) 1.25 MG (566[address] [place]) CAPS capsule Take 1 capsule by mouth once a week 5/7/21 10/22/21 Yes GIO Spears CNP   promethazine (PHENERGAN) 12.5 MG tablet Take 1 tablet by mouth 3 times daily as needed for Nausea 6/18/21 6/25/21 Yes GIO [place] - [facility]   polyethylene glycol [facility]) 17 GM/SCOOP powder Follow instructions provided to you from physician's office. 5/27/21   Javier Elliott MD   bisacodyl (BISACODYL) 5 MG EC tablet Follow instructions provided given by the physician's office.  5/27/21   Rachel Morales MD   magnesium citrate solution Take 296 mLs by mouth once for 1 dose 5/27/21 6/17/21  Javier Elliott MD   albuterol sulfate  (90 Base) MCG/ACT inhaler Inhale 2 puffs into the lungs every 6 hours as needed 2/13/20   Historical Provider, MD        Allergies:     Latex, Poison ivy extract, and Sulfa antibiotics    Social History:     Tobacco:    reports that she quit smoking about 4 months ago. Her smoking use included cigarettes. She has a 5.00 pack-year smoking history. She has never used smokeless tobacco.  Alcohol:      reports no history of alcohol use. Drug Use:  reports current drug use. Frequency: 7.00 times per week. Drug: Marijuana. Family History:     Family History   Problem Relation Age of Onset    Diabetes Maternal Grandmother     Heart Disease Maternal Grandmother        Review of Systems:     Positive and Negative as described in HPI. CONSTITUTIONAL:  negative for fevers, chills, sweats, fatigue, weight loss  HEENT:  negative for vision, hearing changes, runny nose, throat pain  RESPIRATORY:  negative for shortness of breath, cough, congestion, wheezing. CARDIOVASCULAR:  negative for chest pain, palpitations. GASTROINTESTINAL:  negative for nausea, vomiting, diarrhea, constipation, change in bowel habits, abdominal pain   GENITOURINARY:  negative for difficulty of urination, burning with urination, frequency   INTEGUMENT:  negative for rash, skin lesions, easy bruising   HEMATOLOGIC/LYMPHATIC:  negative for swelling/edema   ALLERGIC/IMMUNOLOGIC:  negative for urticaria , itching  ENDOCRINE:  negative increase in drinking, increase in urination, hot or cold intolerance  MUSCULOSKELETAL:  negative joint pains, muscle aches, swelling of joints  NEUROLOGICAL:  negative for headaches, dizziness, lightheadedness, numbness, pain, tingling extremities. Reports very minimal postoperative pain to the right lower extremity.   BEHAVIOR/PSYCH:  negative for depression, anxiety    Physical Exam:     /67   Pulse 68 Temp 97.4 °F (36.3 °C) (Oral)   Resp 16   Ht 5' 3\" (1.6 m)   Wt 193 lb (87.5 kg)   SpO2 99%   BMI 34.19 kg/m²   Temp (24hrs), Av.6 °F (35.9 °C), Min:87.6 °F (30.9 °C), Max:98.1 °F (36.7 °C)    No results for input(s): POCGLU in the last 72 hours. Intake/Output Summary (Last 24 hours) at 2021 1604  Last data filed at 2021 1445  Gross per 24 hour   Intake 1293 ml   Output 120 ml   Net 1173 ml       General Appearance:  alert, well appearing, and in no acute distress  Mental status: oriented to person, place, and time with normal affect  Head:  normocephalic, atraumatic. Eye: no icterus, redness, pupils equal and reactive, extraocular eye movements intact, conjunctiva clear  Ear: normal external ear, no discharge, hearing intact  Nose:  no drainage noted  Mouth: mucous membranes moist  Neck: supple, no carotid bruits, thyroid not palpable  Lungs: Bilateral equal air entry, clear to ausculation, no wheezing, rales or rhonchi, normal effort  Cardiovascular: normal rate, regular rhythm, no murmur, gallop, rub. Abdomen: Soft, nontender, nondistended, normal bowel sounds, no hepatomegaly or splenomegaly  Neurologic: There are no new focal motor or sensory deficits, normal muscle tone and bulk, no abnormal sensation, normal speech, cranial nerves II through XII grossly intact  Skin: No gross lesions, rashes, bruising or bleeding on exposed skin area  Extremities:  peripheral pulses palpable, no pedal edema or calf pain with palpation. Postoperative dressing and brace are in place to the right lower extremities. CMS is intact distally. Psych: normal affect     Investigations:      Laboratory Testing:  Recent Results (from the past 24 hour(s))   POCT urine pregnancy    Collection Time: 21  7:39 AM   Result Value Ref Range    HCG, Pregnancy Urine (POC) NEGATIVE NEGATIVE       Imaging/Diagonstics:  MRI KNEE RIGHT WO CONTRAST    Result Date: 6/15/2021  1.   Moderate to severe chondral degenerative

## 2021-06-18 NOTE — ANESTHESIA PROCEDURE NOTES
Peripheral Block    Patient location during procedure: pre-op  Start time: 6/18/2021 2:12 PM  End time: 6/18/2021 2:22 PM  Staffing  Performed: anesthesiologist   Anesthesiologist: Buster Pino MD  Preanesthetic Checklist  Completed: patient identified, IV checked, site marked, risks and benefits discussed, surgical consent, monitors and equipment checked, pre-op evaluation, timeout performed, anesthesia consent given, oxygen available and patient being monitored  Peripheral Block  Prep: ChloraPrep  Patient monitoring: cardiac monitor, continuous pulse ox, frequent blood pressure checks and IV access  Block type: Sciatic  Laterality: right  Injection technique: catheter  Guidance: ultrasound guided  Local infiltration: lidocaine  Infiltration strength: 1 %  Dose: 3 mL  Popliteal  Provider prep: mask and sterile gloves  Local infiltration: lidocaine  Needle  Needle gauge: 21 G  Needle length: 10 cm  Needle localization: ultrasound guidance  Assessment  Injection assessment: negative aspiration for heme, no paresthesia on injection and local visualized surrounding nerve on ultrasound  Paresthesia pain: none  Slow fractionated injection: yes  Hemodynamics: stable  Additional Notes  U/S 10070.  (1) Under ultrasound guidance, a  gauge needle was inserted and placed in close proximity to the  nerve.  (2) Ultrasound was also used to visualize the spread of the anesthetic in close proximity to the nerve being blocked. (3) The nerve appeared anatomically normal, and (4 there were no apparent abnormal pathological findings on the image that were readily visible and related to the nerve being blocked. (5) A permanent ultrasound image was saved in the patient's record.             Medications Administered  Lidocaine injection 1%, 1 mL  ropivacaine (NAROPIN) injection 0.5%, 30 mL  Reason for block: post-op pain management and at surgeon's request

## 2021-06-19 VITALS
HEART RATE: 58 BPM | SYSTOLIC BLOOD PRESSURE: 92 MMHG | OXYGEN SATURATION: 96 % | HEIGHT: 63 IN | BODY MASS INDEX: 34.2 KG/M2 | DIASTOLIC BLOOD PRESSURE: 50 MMHG | RESPIRATION RATE: 18 BRPM | TEMPERATURE: 97.7 F | WEIGHT: 193 LBS

## 2021-06-19 LAB
ABSOLUTE EOS #: <0.03 K/UL (ref 0–0.44)
ABSOLUTE IMMATURE GRANULOCYTE: 0.06 K/UL (ref 0–0.3)
ABSOLUTE LYMPH #: 2.32 K/UL (ref 1.1–3.7)
ABSOLUTE MONO #: 1.12 K/UL (ref 0.1–1.2)
ANION GAP SERPL CALCULATED.3IONS-SCNC: 11 MMOL/L (ref 9–17)
BASOPHILS # BLD: 0 % (ref 0–2)
BASOPHILS ABSOLUTE: 0.03 K/UL (ref 0–0.2)
BUN BLDV-MCNC: 9 MG/DL (ref 6–20)
BUN/CREAT BLD: 14 (ref 9–20)
CALCIUM SERPL-MCNC: 8.9 MG/DL (ref 8.6–10.4)
CHLORIDE BLD-SCNC: 105 MMOL/L (ref 98–107)
CO2: 23 MMOL/L (ref 20–31)
CREAT SERPL-MCNC: 0.66 MG/DL (ref 0.5–0.9)
DIFFERENTIAL TYPE: ABNORMAL
EOSINOPHILS RELATIVE PERCENT: 0 % (ref 1–4)
GFR AFRICAN AMERICAN: >60 ML/MIN
GFR NON-AFRICAN AMERICAN: >60 ML/MIN
GFR SERPL CREATININE-BSD FRML MDRD: NORMAL ML/MIN/{1.73_M2}
GFR SERPL CREATININE-BSD FRML MDRD: NORMAL ML/MIN/{1.73_M2}
GLUCOSE BLD-MCNC: 89 MG/DL (ref 70–99)
HCT VFR BLD CALC: 38.6 % (ref 36.3–47.1)
HCT VFR BLD CALC: 39.2 % (ref 36.3–47.1)
HEMOGLOBIN: 12.2 G/DL (ref 11.9–15.1)
HEMOGLOBIN: 12.3 G/DL (ref 11.9–15.1)
IMMATURE GRANULOCYTES: 1 %
LYMPHOCYTES # BLD: 18 % (ref 24–43)
MCH RBC QN AUTO: 31.1 PG (ref 25.2–33.5)
MCH RBC QN AUTO: 31.7 PG (ref 25.2–33.5)
MCHC RBC AUTO-ENTMCNC: 31.1 G/DL (ref 28.4–34.8)
MCHC RBC AUTO-ENTMCNC: 31.9 G/DL (ref 28.4–34.8)
MCV RBC AUTO: 100 FL (ref 82.6–102.9)
MCV RBC AUTO: 99.5 FL (ref 82.6–102.9)
MONOCYTES # BLD: 9 % (ref 3–12)
NRBC AUTOMATED: 0 PER 100 WBC
NRBC AUTOMATED: 0 PER 100 WBC
PDW BLD-RTO: 13.2 % (ref 11.8–14.4)
PDW BLD-RTO: 13.3 % (ref 11.8–14.4)
PLATELET # BLD: 153 K/UL (ref 138–453)
PLATELET # BLD: 166 K/UL (ref 138–453)
PLATELET ESTIMATE: ABNORMAL
PMV BLD AUTO: 10.8 FL (ref 8.1–13.5)
PMV BLD AUTO: 10.9 FL (ref 8.1–13.5)
POTASSIUM SERPL-SCNC: 3.7 MMOL/L (ref 3.7–5.3)
RBC # BLD: 3.88 M/UL (ref 3.95–5.11)
RBC # BLD: 3.92 M/UL (ref 3.95–5.11)
RBC # BLD: ABNORMAL 10*6/UL
SEG NEUTROPHILS: 72 % (ref 36–65)
SEGMENTED NEUTROPHILS ABSOLUTE COUNT: 9.25 K/UL (ref 1.5–8.1)
SODIUM BLD-SCNC: 139 MMOL/L (ref 135–144)
WBC # BLD: 10.5 K/UL (ref 3.5–11.3)
WBC # BLD: 12.8 K/UL (ref 3.5–11.3)
WBC # BLD: ABNORMAL 10*3/UL

## 2021-06-19 PROCEDURE — 36415 COLL VENOUS BLD VENIPUNCTURE: CPT

## 2021-06-19 PROCEDURE — 97112 NEUROMUSCULAR REEDUCATION: CPT

## 2021-06-19 PROCEDURE — 97530 THERAPEUTIC ACTIVITIES: CPT

## 2021-06-19 PROCEDURE — 97116 GAIT TRAINING THERAPY: CPT

## 2021-06-19 PROCEDURE — 80048 BASIC METABOLIC PNL TOTAL CA: CPT

## 2021-06-19 PROCEDURE — 6370000000 HC RX 637 (ALT 250 FOR IP): Performed by: STUDENT IN AN ORGANIZED HEALTH CARE EDUCATION/TRAINING PROGRAM

## 2021-06-19 PROCEDURE — 85027 COMPLETE CBC AUTOMATED: CPT

## 2021-06-19 PROCEDURE — 97166 OT EVAL MOD COMPLEX 45 MIN: CPT

## 2021-06-19 PROCEDURE — 2580000003 HC RX 258: Performed by: NURSE PRACTITIONER

## 2021-06-19 PROCEDURE — 85025 COMPLETE CBC W/AUTO DIFF WBC: CPT

## 2021-06-19 PROCEDURE — 2580000003 HC RX 258: Performed by: INTERNAL MEDICINE

## 2021-06-19 PROCEDURE — 99232 SBSQ HOSP IP/OBS MODERATE 35: CPT | Performed by: INTERNAL MEDICINE

## 2021-06-19 PROCEDURE — 97535 SELF CARE MNGMENT TRAINING: CPT

## 2021-06-19 RX ORDER — CLONIDINE HYDROCHLORIDE 0.2 MG/1
0.2 TABLET ORAL NIGHTLY
Qty: 60 TABLET | Refills: 3 | Status: SHIPPED | OUTPATIENT
Start: 2021-06-19 | End: 2021-11-29

## 2021-06-19 RX ORDER — CLONIDINE HYDROCHLORIDE 0.2 MG/1
0.2 TABLET ORAL NIGHTLY
Status: DISCONTINUED | OUTPATIENT
Start: 2021-06-19 | End: 2021-06-19 | Stop reason: HOSPADM

## 2021-06-19 RX ORDER — 0.9 % SODIUM CHLORIDE 0.9 %
500 INTRAVENOUS SOLUTION INTRAVENOUS ONCE
Status: COMPLETED | OUTPATIENT
Start: 2021-06-19 | End: 2021-06-19

## 2021-06-19 RX ADMIN — ACETAMINOPHEN 650 MG: 325 TABLET ORAL at 05:46

## 2021-06-19 RX ADMIN — OXYCODONE HYDROCHLORIDE 5 MG: 5 TABLET ORAL at 07:49

## 2021-06-19 RX ADMIN — OXYCODONE HYDROCHLORIDE 5 MG: 5 TABLET ORAL at 15:38

## 2021-06-19 RX ADMIN — OXYCODONE 10 MG: 5 TABLET ORAL at 02:16

## 2021-06-19 RX ADMIN — SODIUM CHLORIDE 500 ML: 9 INJECTION, SOLUTION INTRAVENOUS at 11:58

## 2021-06-19 RX ADMIN — SODIUM CHLORIDE 500 ML: 9 INJECTION, SOLUTION INTRAVENOUS at 05:48

## 2021-06-19 RX ADMIN — TRANEXAMIC ACID 1300 MG: 650 TABLET ORAL at 14:04

## 2021-06-19 RX ADMIN — OXYCODONE HYDROCHLORIDE 5 MG: 5 TABLET ORAL at 11:57

## 2021-06-19 RX ADMIN — ACETAMINOPHEN 650 MG: 325 TABLET ORAL at 11:57

## 2021-06-19 RX ADMIN — BISACODYL 5 MG: 5 TABLET, COATED ORAL at 07:49

## 2021-06-19 RX ADMIN — TRANEXAMIC ACID 1300 MG: 650 TABLET ORAL at 07:49

## 2021-06-19 ASSESSMENT — PAIN SCALES - GENERAL
PAINLEVEL_OUTOF10: 7
PAINLEVEL_OUTOF10: 4
PAINLEVEL_OUTOF10: 4
PAINLEVEL_OUTOF10: 0
PAINLEVEL_OUTOF10: 2
PAINLEVEL_OUTOF10: 0
PAINLEVEL_OUTOF10: 4

## 2021-06-19 NOTE — PLAN OF CARE
Problem: IP BALANCE  Goal: LTG - Patient will maintain balance to allow for safe/functional mobility  6/19/2021 0208 by Raymond Sullivan RN  Outcome: Ongoing

## 2021-06-19 NOTE — PROGRESS NOTES
Physical Therapy  Facility/Department: [facility] MED SURG  Daily Treatment Note  NAME: Kathy Woodward  : 1995  MRN: [id number]    Date of Service: 2021    Discharge Recommendations:  Home with assist PRN   PT Equipment Recommendations  Equipment Needed: Yes (AX were issued and sized appropriately for pt. Pt has RW pta.)  Mobility Devices: Crutches  Crutches: Axillary    Assessment   Body structures, Functions, Activity limitations: Decreased functional mobility ; Decreased ADL status; Decreased balance  Assessment: Pt struggled with stair training with AX, able to perform stair training on buttocks. Prognosis: Good  Patient Education: Extended amount of time spent ed pt on gait, transfers and stair training with AX & stair training on buttocks. REQUIRES PT FOLLOW UP: Yes  Activity Tolerance  Activity Tolerance: Other (Limited by anxiety)     Patient Diagnosis(es): The encounter diagnosis was Post-op pain. has a past medical history of Anemia, Anxiety, Bipolar disorder ([facility] [address] [employer] storage pool disease [facility]), Depression, Hypotension, Learning disability, Nausea & vomiting, Obesity, PONV (postoperative nausea and vomiting), Seizures ([address] and SOB (shortness of breath). has a past surgical history that includes Tonsillectomy and adenoidectomy; intrauterine device insertion; Cholecystectomy, laparoscopic (16); [place] tooth extraction; Endometrial ablation (2018); Tubal ligation (2018); hernia repair; eye surgery (Left); and Knee arthroscopy (Right, 2021).     Restrictions  Restrictions/Precautions  Restrictions/Precautions: Weight Bearing, General Precautions, Fall Risk  Required Braces or Orthoses?: Yes (Knee brace locked into full ext)  Lower Extremity Weight Bearing Restrictions  Right Lower Extremity Weight Bearing: Non Weight Bearing (NWB RLE)  Required Braces or Orthoses  Right Lower Extremity Brace: Knee Brace  RLE Brace Type: Hinged knee brace locked in extension  Position Activity Restriction  Other position/activity restrictions: RUE IV, infu block  Subjective : Pt agreeable to PT tx including gait and stair training. Orientation  Orientation  Overall Orientation Status: Within Functional Limits       Objective   Bed mobility  Supine to Sit: Modified independent  Sit to Supine: Modified independent (HOB elevated, extra time to complete)  Scooting: Modified independent  Transfers  Sit to Stand: Contact guard assistance;Stand by assistance  Stand to sit: Contact guard assistance;Stand by assistance  Stand Pivot Transfers: Contact guard assistance  Comment: Transfers with AX, cues for safe hand placement, steadying as needed, vc to widen AX placement, NWB RLE  Ambulation  Ambulation?: Yes  Ambulation 1  Device: Rolling Walker; Axillary Crutches  Assistance: Contact guard assistance;Stand by assistance  Quality of Gait: Pt demos safe technique with RW, SBA/CGA with AX and steadying as needed, pt anxious with gait & stair training with AX. Gait Deviations: Slow Dianelys;Decreased step length;Decreased step height;Decreased head and trunk rotation  Distance: 3 ft x 2  Stairs/Curb  Stairs?: Yes  Stairs  # Steps : 3  Stairs Height: 6\"  Comment: Attempted stair training with AX and assist x 2, pt able to hop up one step, then became anxious and lightheaded, pt assist back to w/c. Ed. pt on numerous options for safe entry into her parents home; 5-6 without HR's. Pt then able to ascend/descend 3 steps on buttocks. Pt ed on use of intermediate seat heights to assist particularly with sit to stand once she has ascended to top step. Pt also ed on option of use of w/c and having friend or family member bump her up/down the steps. Pt agreeable to discharge with plan to ascend steps on her buttocks with assist of her parents as needed.      Balance  Posture: Good  Sitting - Static: Good  Sitting - Dynamic: Good  Standing - Static: Good;-  Standing - Dynamic: Fair;+ (Fair - with AX)  Exercises  Comments: Reviewed supine LE ther ex within precautions. OutComes Score     AM-PAC Score  AM-PAC Inpatient Mobility Raw Score : 17 (06/19/21 1201)  AM-PAC Inpatient T-Scale Score : 42.13 (06/19/21 1201)  Mobility Inpatient CMS 0-100% Score: 50.57 (06/19/21 1201)  Mobility Inpatient CMS G-Code Modifier : CK (06/19/21 1201)        Goals  Short term goals  Time Frame for Short term goals: 6 visits  Short term goal 1: Patient will be indep with bed mobility. Short term goal 2: Patient will be indep with transfers. Short term goal 3: Patient will amb 40 feet with RW indep while maintaining NWB RLE. Short term goal 4: Patient will negotiate 3-5 stairs with crutches and min assist (or be able to scoot up on buttocks). Short term goal 5: Patient will be indep with HEP. Patient Goals   Patient goals : Pain control    Plan    Plan  Times per week: 7 d/wk  Current Treatment Recommendations: Strengthening, Balance Training, Functional Mobility Training, Transfer Training, Stair training, Gait Training, Endurance Training, Patient/Caregiver Education & Training, Home Exercise Program, Safety Education & Training  Safety Devices  Type of devices:  All fall risk precautions in place, Gait belt, Nurse notified, Call light within reach, Left in bed     Therapy Time   Individual Concurrent Group Co-treatment   Time In 1040         Time Out [address]

## 2021-06-19 NOTE — CARE COORDINATION
her leg she would prefer to go to outpt therapy then. Pt stayed overnight due to low blood pressure    Home with assist from family. No DME needs.         Electronically signed by Kristal Cornelius on 6/19/21 at 3:14 PM EDT

## 2021-06-19 NOTE — PROGRESS NOTES
Occupational Therapy   Occupational Therapy Initial Assessment  Date: 2021   Patient Name: Diane Gómez  MRN: [id number]     : 1995    RN Levi Magallanes reports patient is medically stable for therapy treatment this date. Chart reviewed prior to treatment and patient is agreeable for therapy. All lines intact and patient positioned comfortably at end of treatment. All patient needs addressed prior to ending therapy session. Due to recent hospitalization and medical condition, pt would benefit from additional therapy at time of discharge to ensure safety. Please refer to the AM-PAC score for current functional status. Date of Service: 2021    Discharge Recommendations:  Patient would benefit from continued therapy after discharge  OT Equipment Recommendations  Equipment Needed: Yes  Mobility Devices: [place] Service; ADL Assistive Devices  Walker: Rolling  ADL Assistive Devices: Grab Bars - shower; Toileting - 3-in-1 Commode;Reacher;Sock-Aid Soft;Long-handled Shoe [place];Long-handled Sponge    Assessment   Performance deficits / Impairments: Decreased coordination;Decreased functional mobility ; Decreased ADL status; Decreased endurance;Decreased high-level IADLs  Assessment: Skilled OT POC is recommended for this pt to increase overall I and safety awareness in function as well as improve balance and act stas to return home with assist as needed. Prognosis: Good  Decision Making: Medium Complexity  OT Education: Precautions;OT Role;Plan of Care;Energy Conservation;Transfer Training  Patient Education: safety in function, call light use/fall prevention, recommendations for being up OOB and continued therapy services, weight shifting, pursed lip breathing, proper positioning of RLE  REQUIRES OT FOLLOW UP: Yes  Activity Tolerance  Activity Tolerance: Patient limited by fatigue;Patient limited by pain; Patient Tolerated treatment well  Activity Tolerance: fair minus  Safety Devices  Safety Devices in place: Yes  Type of devices: Call light within reach; Left in chair;Patient at risk for falls;Gait belt;Nurse notified (RN declined for chair alarms; RLE elevated on stool/pillow under to increase overall comfort/reduce pain as able)           Patient Diagnosis(es): The encounter diagnosis was Post-op pain. has a past medical history of Anemia, Anxiety, Bipolar disorder ([facility] [address] [place] storage pool disease [facility]), Depression, Hypotension, Learning disability, Nausea & vomiting, Obesity, PONV (postoperative nausea and vomiting), Seizures ([facility] [address] and SOB (shortness of breath). has a past surgical history that includes Tonsillectomy and adenoidectomy; intrauterine device insertion; Cholecystectomy, laparoscopic (5-20-16); [place] tooth extraction; Endometrial ablation (2018); Tubal ligation (2018); hernia repair; eye surgery (Left); and Knee arthroscopy (Right, 6/18/2021). PER H&P: Anna Herbert pain. The patient works at [employer] pain has been present for 2 years. The right knee hurts worse than the left. The patient has had 3 dislocations with the right knee. Per chart pt is s/p:    Date of Procedure: 6/18/2021     Pre-Op Diagnosis: Right patella dislocation     Post-Op Diagnosis: Right patella dislocation       Procedure(s):  1. Right knee arthroscopy   2. Right arthroscopic lateral release   3.  Right tibial tubercle osteotomy with anterior medialization     Restrictions  Restrictions/Precautions  Restrictions/Precautions: Weight Bearing, General Precautions, Fall Risk  Required Braces or Orthoses?: Yes  Lower Extremity Weight Bearing Restrictions  Right Lower Extremity Weight Bearing: Non Weight Bearing  Required Braces or Orthoses  Right Lower Extremity Brace: Knee Brace  RLE Brace Type: Hinged knee brace locked in extension  Position Activity Restriction  Other position/activity restrictions: RUE IV, infu block    Subjective   General  Chart Reviewed: Yes  Patient assessed for rehabilitation services?: Yes  Family / Caregiver Present: No  Pain Assessment  *pt c/o 3-4/10 pain in R knee to ankle and states she was medicated prior to writer arrival     Social/Functional History  Social/Functional History  Lives With: Alone (pt plans to DC to her parent's house after hospitalization; pt states her mom will have to go to work on Monday.)  Type of Home: House  Home Layout: Two level, Able to Live on Main level with bedroom/bathroom  Home Access: Stairs to enter without rails (front door)  Entrance Stairs - Number of Steps: 3 to 5 steps into home  Bathroom Shower/Tub: Tub/Shower unit  [place] Toilet: Standard  Bathroom Equipment:  (None)  Home Equipment:  (pt states she is borrowing rollator)  [address] Help From: Family (pt states her family is supportive)  ADL Assistance: Independent  Homemaking Assistance: Independent  Homemaking Responsibilities: Yes  Ambulation Assistance: Independent  Transfer Assistance: Independent  Active : No  Occupation: Full time employment  Type of occupation: Housekeeping and laundry  Leisure & Hobbies: cleaning  Additional Comments: Pt states she has had several falls in the last few months due to her R knee buckling. Objective   Vision: Impaired (pt states her glasses are broken; pt denies visual changes)  Vision Exceptions: Wears glasses for distance  Hearing: Within functional limits    Orientation  Overall Orientation Status: Within Functional Limits  Observation/Palpation  Posture: Good (with RW)  Observation: RLE hinged knee brace locked in extension/ace wrapped, polar care, infu block. Edema: RLE  Balance  Sitting Balance: Supervision  Standing Balance: Contact guard assistance (with RW)  Standing Balance  Time: stand stas < 1 min with RW for functional tasks  Functional Mobility  Functional - Mobility Device: Rolling Walker (pt was edu on RLE NWB and importance of adherence to MD orders for healing purposes. Cognitive Status: WFL  Perception  Overall Perceptual Status: WFL     Sensation  Overall Sensation Status: WFL (for BUE's and pt c/o some RLE numbness/tingling)        LUE AROM (degrees)  LUE AROM : WFL  RUE AROM (degrees)  RUE AROM : WFL  LUE Strength  Gross LUE Strength: WFL  LUE Strength Comment: BUE strength grossly 4 plus/5  RUE Strength  Gross RUE Strength: WFL                   Plan   Plan  Times per week: 4-5x/week 1x/day as stas  Current Treatment Recommendations: Strengthening, Endurance Training, Balance Training, Functional Mobility Training, Safety Education & Training, Positioning, Pain Management, Home Management Training, Self-Care / ADL, Equipment Evaluation, Education, & procurement, Patient/Caregiver Education & Training                                                  AM-PAC Score   18          Goals  Short term goals  Time Frame for Short term goals: by discharge, pt to demo  Short term goal 1: MOD I for all bed mob tasks with use of rail and modified tech/bed sheets as leg lift as needed. Short term goal 2: I with BUE HEP with use of handouts as neede to maintain strength. Short term goal 3: UB ADL to set up and LB ADL to SBA with use of AD/AE. Short term goal 4: ADL transfers and functional mob with AD to SUP level and 100% adherence to RLE NWB. Short term goal 5: toileting tasks with use of grab bar/AD as needed to SUP. Long term goals  Long term goal 1: Pt to stand with SUP and AD stas > 6 mins to reduce falls in function. Long term goal 2: Pt/caregivers to be I with pressure relief, proper positioning, EC/WS and fall prevention tech as well as DME/AD and AE recommendations with use of handouts. Patient Goals   Patient goals : My lower half and get home!        Therapy Time   Individual Concurrent Group Co-treatment   Time In 0895 (plus 10 min chart review/RN communication)         Time Out 0922         Minutes [address], [place]

## 2021-06-19 NOTE — PROGRESS NOTES
[facility]  Office: [address], DO, Ana Lopezight, DO, Elise Plana, DO, Bakari Currie Blood, DO, Marylene Freiberg, MD, Wilma Moore MD, Madelyn Gtz MD, Desiree Aleman MD, Fina Thornton MD, Kayla York MD, Karla Campa MD, Susan Shay MD, [place] Mendez, DO, Prabhakar Bryant MD, Gina Hyman, DO, Kenny Hart MD,  Orien Dakin, DO, Fallon Rosado MD, Monica Delcid MD, Zenon Hebert MD, Deep iSmms MD, Waberyl Wright, [facility], [facility] Sheila, CNP, Saige Burnett, [facility], Dylan Price, CNS, Vangie Julianna, CNP, Vale Wilkinson, CNP, Lance Reardon, CNP, Itezl Barragan, CNP, Zena Diaz, CNP, Amelia Espino PA-C, Kirsten Hurtado, [facility], Diane Ocampo, CNP, Lady Santana, CNP, Avery Stanley, CNP, Grecia Will, CNP, Ti Campbell, CNP, Shauna Ogden, [facility]    Progress Note    6/19/2021    10:30 AM    Name:   Kathy Woodward  MRN:     [id number]     Acct:      [de-identified]   Room:   2021/2021-01  IP Day:  0  Admit Date:  6/18/2021  7:29 AM    PCP:   GIO Alvarenga CNP  Code Status:  Full Code    Subjective:     C/C: Knee pain     Interval History Status: improved. Patient seen and examined today, ambulating well. No events overnight. Patient will go home after discharge to her parents house will help her with ADLs. Patient received a 500 cc bolus for hypotension this morning. Brief History:     Patient reports to the hospital for scheduled knee surgery. Internal medicine has been requested to consult on the case for management of chronic medical conditions. Patient has extensive mental health history. Patient's mother reports that she has been stable on her current oral regiment.   Patient also has endorsed a 100 pound weight loss over the past 1 year with recurrent vomiting every night between 2 and 3 AM.  Patient has had an appointment with a gastroenterologist and is scheduled to have EGD and colonoscopy the first week of July. Mother reports that Zofran has been ineffective at alleviating the patient's symptoms. Postoperatively the patient's pain is well controlled. Postoperative dressing and brace are in place without signs of infiltration, hemorrhage, induration. CMS is intact distally.     From internal medicine standpoint this patient can be discharged at their discretion. Patient has outpatient follow-up already scheduled with GI for her weight loss and recurrent vomiting. Patient is well-established with PCP and her mental health is reported as stable on current regiment. Review of Systems:     Constitutional:  negative for chills, fevers, sweats  Respiratory:  negative for cough, dyspnea on exertion, shortness of breath, wheezing  Cardiovascular:  negative for chest pain, chest pressure/discomfort, lower extremity edema, palpitations  Gastrointestinal:  negative for abdominal pain, constipation, diarrhea, nausea, vomiting  Neurological:  negative for dizziness, headache    Medications: Allergies:     Allergies   Allergen Reactions    Latex Rash    Poison Ivy Extract Rash     \"was one big welt\"    Sulfa Antibiotics Anaphylaxis       Current Meds:   Scheduled Meds:    cloNIDine  0.2 mg Oral Nightly    scopolamine  1 patch Transdermal Once    amphetamine-dextroamphetamine  20 mg Oral QAM    tranexamic acid  1,300 mg Oral TID    sodium chloride flush  10 mL Intravenous 2 times per day    acetaminophen  650 mg Oral Q6H    bisacodyl  5 mg Oral Daily     Continuous Infusions:    sodium chloride Stopped (06/18/21 2144)    sodium chloride      bupivacaine 0.125% 500 mL (06/18/21 1425)     PRN Meds: amphetamine-dextroamphetamine, butalbital-acetaminophen-caffeine, propranolol, sodium chloride flush, sodium chloride, oxyCODONE **OR** oxyCODONE, morphine **OR** morphine, magnesium hydroxide, ondansetron **OR** ondansetron, promethazine    Data:     Past Medical History:   has a past medical history of Anemia, Anxiety, Bipolar disorder ([facility] [address] [place] storage pool disease [facility]), Depression, Hypotension, Learning disability, Nausea & vomiting, Obesity, PONV (postoperative nausea and vomiting), Seizures ([facility] [address] and SOB (shortness of breath). Social History:   reports that she quit smoking about 4 months ago. Her smoking use included cigarettes. She has a 5.00 pack-year smoking history. She has never used smokeless tobacco. She reports current drug use. Frequency: 7.00 times per week. Drug: Marijuana. She reports that she does not drink alcohol. Family History:   Family History   Problem Relation Age of Onset    Diabetes Maternal Grandmother     Heart Disease Maternal Grandmother        Vitals:  BP (!) 89/53   Pulse 54   Temp 97.9 °F (36.6 °C) (Oral)   Resp 14   Ht 5' 3\" (1.6 m)   Wt 193 lb (87.5 kg)   SpO2 95%   BMI 34.19 kg/m²   Temp (24hrs), Av.8 °F (36 °C), Min:87.6 °F (30.9 °C), Max:98.1 °F (36.7 °C)    No results for input(s): POCGLU in the last 72 hours. I/O (24Hr): Intake/Output Summary (Last 24 hours) at 2021 1030  Last data filed at 2021 0717  Gross per 24 hour   Intake 3336.57 ml   Output 3220 ml   Net 116.57 ml       Labs:  Hematology:  Recent Labs     21  0531   WBC 12.8*   RBC 3.92*   HGB 12.2   HCT 39.2   .0   MCH 31.1   MCHC 31.1   RDW 13.2      MPV 10.9     Chemistry:  Recent Labs     21  0531      K 3.7      CO2 23   GLUCOSE 89   BUN 9   CREATININE 0.66   ANIONGAP 11   LABGLOM >60   GFRAA >60   CALCIUM 8.9   No results for input(s): PROT, LABALBU, LABA1C, P0LHEIZ, B7EBHFN, FT4, TSH, AST, ALT, LDH, GGT, ALKPHOS, LABGGT, BILITOT, BILIDIR, AMMONIA, AMYLASE, LIPASE, LACTATE, CHOL, HDL, LDLCHOLESTEROL, CHOLHDLRATIO, TRIG, VLDL, ZRZ45KU, PHENYTOIN, PHENYF, URICACID, POCGLU in the last 72 hours.   ABG:No results found for: POCPH, PHART, PH, POCPCO2, LGR1CTI, PCO2, POCPO2, PO2ART, PO2, POCHCO3, IZB0TTZ, HCO3, NBEA, PBEA, BEART, BE, THGBART, THB, GSM4EMZ, ALAJ6LPP, R5DYVGAL, O2SAT, FIO2  Lab Results   Component Value Date/Time    SPECIAL NOT REPORTED 12/09/2019 04:20 PM     Lab Results   Component Value Date/Time    CULTURE NO SIGNIFICANT GROWTH 12/09/2019 04:20 PM       Radiology:  MRI KNEE RIGHT WO CONTRAST    Result Date: 6/15/2021  1. Moderate to severe chondral degenerative changes at the lateral patellar facet with mild lateral patellar subluxation noted. No evidence of recent lateral patellar dislocation, but there is intermediate signal at the patellar attachment of the medial patellar retinaculum, which may be related to old injury. 2.  Thin and attenuated LCL, possibly related to remote injury. 3.  Small joint effusion. 4.  Mild edematous signal in the superolateral Hoffa's fat, which can be seen with fat pad impingement. 5.  Incidental note of high origin of the anterior tibial artery. Physical Examination:        General appearance:  alert, cooperative and no distress  Mental Status:  oriented to person, place and time and normal affect  Lungs:  clear to auscultation bilaterally, normal effort  Heart:  regular rate and rhythm, no murmur  Abdomen:  soft, nontender, nondistended, normal bowel sounds, no masses, hepatomegaly, splenomegaly  Extremities:  no edema, redness, tenderness in the calves  Skin:  no gross lesions, rashes, induration    Assessment:        Hospital Problems         Last Modified POA    Anxiety 6/18/2021 Yes    Esophageal reflux 6/18/2021 Yes    Morbid obesity ([address] 6/18/2021 Yes    Recurrent dislocation of patella, right knee 6/18/2021 Yes          Plan:        1. Hypotension-possibly secondary to her Catapres from this morning. Decrease to 0.2 mg from 0.3 mg. Patient received 1 500 cc bolus, will place an additional 500cc and check CBC at noon. 2. Anxiety  3. Esophageal reflux  4. Morbid obesity  5.  Recurrent dislocation of patella- Await further planning from orthopedic surgery. 6. Discharge plan: Discharge per orthopedic surgery.   Patient still hypotensive, Catapres may need to be adjusted    Sabi Stpehens DO  6/19/2021  10:30 AM

## 2021-06-19 NOTE — PLAN OF CARE
Problem: IP BALANCE  Goal: LTG - Patient will maintain balance to allow for safe/functional mobility  Outcome: Ongoing     Problem: Falls - Risk of:  Goal: Will remain free from falls  Description: Will remain free from falls  Outcome: Ongoing     Problem: Falls - Risk of:  Goal: Absence of physical injury  Description: Absence of physical injury  Outcome: Ongoing     Problem: Skin Integrity:  Goal: Will show no infection signs and symptoms  Description: Will show no infection signs and symptoms  Outcome: Ongoing     Problem: Skin Integrity:  Goal: Absence of new skin breakdown  Description: Absence of new skin breakdown  Outcome: Ongoing

## 2021-06-19 NOTE — PROGRESS NOTES
Dr. Vi Washington made aware of bp of 87/60 p49. Pt has been asymptomatic when ambulating, sitting in chair and returning to bed. Pt. Denies pain/discomfort.

## 2021-06-20 DIAGNOSIS — K92.0 COFFEE GROUND EMESIS: ICD-10-CM

## 2021-06-20 DIAGNOSIS — G43.009 MIGRAINE WITHOUT AURA AND WITHOUT STATUS MIGRAINOSUS, NOT INTRACTABLE: ICD-10-CM

## 2021-06-20 DIAGNOSIS — E55.9 VITAMIN D INSUFFICIENCY: ICD-10-CM

## 2021-06-21 NOTE — OP NOTE
Operative Note      Patient: Francisco Bowen  YOB: 1995  MRN: [id number]    Date of Procedure: 6/18/2021    Pre-Op Diagnosis: DX RIGHT PATELLA DISLOCATION    Post-Op Diagnosis: Right knee patella andreas, trochlear dysplasia, chondromalacia patella, tight lateral retinaculum       Procedure(s):  RIGHT KNEE ARTHROSCOPY WITH DEBRIDEMENT AND LATERAL RELEASE, TIBIAL TUBERCLE OSTEOTOMY    Surgeon(s):  Chas Garcia DO    Assistant:   Surgical Assistant: Essie Gutierrez  Resident: Hernesto Hoang DO; Ana Orozco DO    Anesthesia: General    Estimated Blood Loss (mL): 20    Complications: None    Specimens:   * No specimens in log *    Implants:  Implant Name Type Inv. Item Serial No.  Lot No. LRB No. Used Action   GRAFT BNE SUB SM 5CC DBM BIOCOMPOSITE OSTEOSET CANC CHIP - [id number]  GRAFT BNE SUB SM 5CC DBM BIOCOMPOSITE OSTEOSET CANC CHIP [id number] [employer] technologies  Right 1 Implanted   SCREW BONE L L48MM DIA5MM COMPR FULL THRD  SCREW BONE L L48MM DIA5MM COMPR FULL THRD  [employer]  Right 1 Implanted   SCREW BONE L L44MM DIA5MM HDLSS COMPR FULL THRD  SCREW BONE L L44MM DIA5MM HDLSS COMPR FULL THRD  [employer]  Right 1 Implanted         Drains:   [REMOVED] Urethral Catheter Non-latex 16 fr (Removed)       Findings: See postop diagnosis    Detailed Description of Procedure:   Amleia Florez is a 24-year-old female with longstanding right knee pain with patellar subluxation. She has findings of patella alto, trochlear dysplasia, lateral patellar tilt and subluxation. She has an elevated TT to TG ratio. She has failed extensive conservative therapy. She understands risk and benefits of the procedure. Informed consent was signed. Operative site was marked. Preoperative antibiotics were given. Patient was taken operative suite placed supine position op table. General inhalation anesthetic with endotracheal ovation was performed. Yu cath was placed.   Well-padded tourniquet was placed on her right proximal thigh. Exam under anesthesia revealed a subluxable patella to 90 degrees of flexion. Patient was prepped draped in normal sterile fashion. Surgical timeout was performed. 3 portal arthroscopic technique was utilized including an inferolateral inferomedial and superior lateral portals to excess patellar tracking. 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 There was lateral tracking and tilt noted. There was grade III chondromalacia on the lateral facet and an area of 15 x 20 mm. The medial facet was spared except for what appeared to be a healed MPFL avulsion. A suction shaver was utilized through both the superior lateral and inferomedial portals while viewing from the opposite portal to debride the patella back down to a stable border. There is one small area of grade 4 in the central portion of this but it was mostly grade 3. The suprapatellar pouch, medial and lateral gutter and all compartments were suctioned of multiple small loose bodies. The medial compartment was entered the medial femoral condyle, medial tibial plateau medial meniscus were intact to visualization and probing. The intercondylar notch was entered the ACL and PCL were intact to visualization and probing and stress testing. The lateral compartment was entered the lateral femoral condyle lateral tibial plateau and lateral meniscus was intact to visualization and probing. The scope was placed in the inferomedial portal a lateral release ArthroCare was obtained. An arthroscopic lateral release was performed from the superior pole of the patella of the lateral portal relieving the lateral patellar tilt and tight lateral retinaculum. All instruments removed from the knee joint. A midline incision was made from midportion of the patellar tendon to 5 cm distal to the tibial tubercle. Full-thickness skin flap was established.   The patellar tendon was delineated through the infrapatellar bursa.  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 An Arthrex and Z T3 guide was placed at 45 degree angle cut and pinned in place. Cut was checked to not exit the posterior cortex of the tibia. The cut was performed with an oscillating saw. The angular cuts were connected with osteotomes. The distal periosteal cortex was cracked. The distal 1 cm of the osteotomy was excised with a rongeur for distalization. The osteotomy was transferred medially 1 cm and pinned in place. This was verified with C arm fluoroscopy. Guide pins were measured and two 5.0 headless compression screws were placed with excellent bite. There was now excellent patellar tracking with no subluxation with a centered patella at 30 degrees of knee flexion. This is verified with the arthroscope in the superior lateral portal.  It was felt that a MPFL reconstruction was not needed and may over constrained the patella and compress the patellar articular surface. Tourniquet was dropped and all bleeding was controlled with Bovie electrocautery. Bone putty was placed in the exposed bone surfaces. Anterior compartment was closed with 0 Vicryl suture. Subtenons tissue was closed with 0 Vicryl 2-0 Vicryl 3-0 Monocryl. Aquacel dressing was placed. Bulky knee dressing was placed. Hinged knee brace was placed locked in extension. Patient was awakened by part anesthesia and transferred to the postanesthesia care unit in stable condition.     Electronically signed by Author Francisco DO on 6/21/2021 at 6:37 AM

## 2021-06-21 NOTE — TELEPHONE ENCOUNTER
LOV 4/7/21  RTO 4 weeks; F/U scheduled  LRF 6/3/21 and 5/7/21    Health Maintenance   Topic Date Due    Hepatitis C screen  Never done    Cervical cancer screen  Never done    Flu vaccine (Season Ended) 09/01/2021    DTaP/Tdap/Td vaccine (2 - Td or Tdap) 03/06/2029    COVID-19 Vaccine  Completed    HIV screen  Completed    Hepatitis A vaccine  Aged Out    Hepatitis B vaccine  Aged Out    Hib vaccine  Aged Out    Meningococcal (ACWY) vaccine  Aged Out    Pneumococcal 0-64 years Vaccine  Aged Out    Varicella vaccine  Discontinued             (applicable per patient's age: Cancer Screenings, Depression Screening, Fall Risk Screening, Immunizations)    Hemoglobin A1C (%)   Date Value   04/08/2021 5.2   07/29/2020 5.3   12/09/2019 5.4     LDL Cholesterol (mg/dL)   Date Value   07/29/2020 127     LDL Calculated (mg/dL)   Date Value   04/08/2021 172 (H)     AST (U/L)   Date Value   04/08/2021 20     ALT (U/L)   Date Value   04/08/2021 24     BUN (mg/dL)   Date Value   06/19/2021 9      (goal A1C is < 7)   (goal LDL is <100) need 30-50% reduction from baseline     BP Readings from Last 3 Encounters:   06/19/21 (!) 92/50   06/18/21 137/78   05/28/21 111/72    (goal /80)      All Future Testing planned in [facility]:  Lab Frequency Next Occurrence   XR KNEE RIGHT (1-2 VIEWS) Once 04/08/2022   Lactate Dehydrogenase Once 04/08/2022   Calprotectin Stool Once 04/08/2022   Clostridium Difficile Toxin/Antigen Once 04/08/2022   Gastrointestinal Panel, Molecular Once 04/08/2022   Fecal lactoferrin Once 04/08/2022   Blood Occult Stool #1 Once 04/08/2022   H.  Pylori Antigen, EIA Once 04/08/2022   Shadow Networksight Psychotropic (combinatorial pharmacogenomics test) Once 04/08/2022   EGD Once 05/27/2021   COLONOSCOPY W/ OR W/O BIOPSY Once 05/27/2021       Next Visit Date:  Future Appointments   Date Time Provider Mario Bullard   6/22/2021  2:00 PM Karolina Eisenberg, APRN - [facility]   6/29/2021  1:15 PM Cornelious Serge Kim Ball PA-C [facility]            Patient Active Problem List:     Migraine headache     Vaginal bleeding     Anxiety     Vitamin D insufficiency     PTSD (post-traumatic stress disorder)     Excessive and frequent menstruation     Adult body mass index 40 and over     Attention deficit disorder without hyperactivity     Calculus of gallbladder with chronic cholecystitis without obstruction     Developmental disorder of scholastic skill     Endometriosis     Esophageal reflux     Observation of other suspected mental condition     Pelvic congestion     Platelet dense granule deficiency (HCC)     Platelet function defect ([address]     Morbid obesity ([address]     Recurrent dislocation of patella, right knee

## 2021-06-22 ENCOUNTER — TELEMEDICINE (OUTPATIENT)
Dept: FAMILY MEDICINE CLINIC | Age: 26
End: 2021-06-22
Payer: MEDICARE

## 2021-06-22 DIAGNOSIS — K92.0 COFFEE GROUND EMESIS: ICD-10-CM

## 2021-06-22 DIAGNOSIS — R11.2 NON-INTRACTABLE VOMITING WITH NAUSEA, UNSPECIFIED VOMITING TYPE: ICD-10-CM

## 2021-06-22 DIAGNOSIS — R63.4 WEIGHT LOSS, UNINTENTIONAL: ICD-10-CM

## 2021-06-22 DIAGNOSIS — K21.9 GASTROESOPHAGEAL REFLUX DISEASE, UNSPECIFIED WHETHER ESOPHAGITIS PRESENT: Primary | ICD-10-CM

## 2021-06-22 PROCEDURE — 99214 OFFICE O/P EST MOD 30 MIN: CPT | Performed by: NURSE PRACTITIONER

## 2021-06-22 RX ORDER — ONDANSETRON 4 MG/1
4 TABLET, ORALLY DISINTEGRATING ORAL EVERY 8 HOURS PRN
Qty: 20 TABLET | Refills: 0 | Status: SHIPPED | OUTPATIENT
Start: 2021-06-22 | End: 2021-07-09 | Stop reason: SDUPTHER

## 2021-06-22 RX ORDER — ERGOCALCIFEROL 1.25 MG/1
50000 CAPSULE ORAL WEEKLY
Qty: 12 CAPSULE | Refills: 1 | Status: SHIPPED | OUTPATIENT
Start: 2021-06-22 | End: 2021-07-09 | Stop reason: SDUPTHER

## 2021-06-22 RX ORDER — BUTALBITAL, ACETAMINOPHEN AND CAFFEINE 50; 325; 40 MG/1; MG/1; MG/1
1 TABLET ORAL EVERY 4 HOURS PRN
Qty: 20 TABLET | Refills: 0 | Status: SHIPPED | OUTPATIENT
Start: 2021-06-22 | End: 2021-07-09 | Stop reason: SDUPTHER

## 2021-06-22 ASSESSMENT — ENCOUNTER SYMPTOMS
TROUBLE SWALLOWING: 0
RHINORRHEA: 0
WHEEZING: 0
ABDOMINAL PAIN: 0
BLOOD IN STOOL: 1
COUGH: 0
NAUSEA: 0
CHEST TIGHTNESS: 0
SINUS PRESSURE: 0
SORE THROAT: 0
CONSTIPATION: 1
SHORTNESS OF BREATH: 0
DIARRHEA: 0
BACK PAIN: 0

## 2021-06-22 NOTE — PROGRESS NOTES
[address]  [phone]    21    TELEHEALTH EVALUATION -- Audio/Visual (During AKAJT-59 public health emergency)    Patient ID: Angelica Sarah (:  1995) 30 y.o. female, has requested an audio/video evaluation for the following concern(s):  Established patient    Have you seen any other physician or provider since your last visit? Yes - Records Obtained Ortho- Dr. Kenia Abel- Dr. Gildardo Sanchez   Have you had any other diagnostic tests since your last visit? Yes - Records Obtained MRI Knee, CT Knee, US Renal, Blood work  Have you been seen in the emergency room and/or had an admission to a hospital since we last saw you? Yes - Records Obtained [address] ([address]) Right Knee Surgery    Chief Complaint  Angelica Sarah presents today for Emesis (Onset 1 year. 5 nights per week. Lost 100lb this year. Appetite changes. Coffee ground. ) and Rectal Bleeding (Onset 1 year. Denies any fever. )  . ASSESSMENT/PLAN    1. Gastroesophageal reflux disease, unspecified whether esophagitis present  2. Weight loss, unintentional  3. Coffee ground emesis  4. Non-intractable vomiting with nausea, unspecified vomiting type       Return for As needed. On this date 2021 I have spent 30+ minutes reviewing previous notes, test results and face to face with the patient discussing the diagnosis and importance of compliance with the treatment plan as well as documenting on the day of the visit.     Patient Care Team:  GIO Dalal CNP as PCP - General (Nurse Practitioner Family)  GIO Dalal CNP as PCP - [facility] Empaneled Provider  Demi Bass (Obstetrics & Gynecology)  Danelle Amato MD as Consulting Physician (Gastroenterology)  Author Francisco DO as Consulting Physician (Orthopedic Surgery)    SUBJECTIVE/OBJECTIVE    History of Present illness / Visit Summary   She is scheduled for follow-up appointment today regarding her ongoing abdominal pain discomfort. Patient also has ongoing nausea and vomiting. Office appointment was changed to virtual visit today due to the fact the patient recently had surgical intervention done to her knee by orthopedic surgeon. Due to technical difficulties we were able to connect after the initiation of her appointment. Patient does appear comfortable resting in the chair in her mother's home. Patient is seen with her mother for the duration until all the above is resolved. Her knee surgery was done on June 18. Patient was discharged from the hospital the next day and June 19. Patient is rarely taking Percocet any longer. She is doing well with pain control with acetaminophen. The pain pump was removed from her knee yesterday per her mother. There is no signs of infection. Patient will have physical therapy however they are taking their time due to the concern for delta granular disease. Patient also has follow-up appointment with orthopedic surgeon PA on June 29. There is no staples but there are still stitches in place. Patient also has scheduled EGD and colonoscopy with gastroenterologist on July 1. There is high concern with her dysphagia that she may need an esophageal dilation completed. I am also highly suspicious of stomach/duodenal ulcer. Also due to patient's ongoing projectile vomiting, especially eating solid foods, I am concerned of esophageal stricture. Patient is able to keep protein drinks down with no nausea, is more the solid foods that are agitating her. Review of Systems  Review of Systems   Constitutional: Negative for activity change, appetite change, chills, fatigue and fever. HENT: Negative for congestion, ear pain, postnasal drip, rhinorrhea, sinus pressure, sneezing, sore throat and trouble swallowing. Respiratory: Negative for cough, chest tightness, shortness of breath and wheezing. Cardiovascular: Negative for chest pain, palpitations and leg swelling. Gastrointestinal: Positive for blood in stool and constipation (giving stool softeners ). Negative for abdominal pain, diarrhea and nausea. Genitourinary: Negative for difficulty urinating, dysuria, frequency, hematuria and urgency. Musculoskeletal: Positive for arthralgias and joint swelling. Negative for back pain, gait problem and myalgias. Recent right knee procedure    Skin: Negative for rash and wound. Allergic/Immunologic: Negative for environmental allergies. Neurological: Negative for dizziness, syncope, light-headedness, numbness and headaches. Hematological: Does not bruise/bleed easily. Psychiatric/Behavioral: Positive for agitation and dysphoric mood. Negative for decreased concentration, self-injury, sleep disturbance and suicidal ideas. The patient is nervous/anxious. Physical exam   Physical Exam  Vitals and nursing note reviewed. Constitutional:       General: She is not in acute distress. Appearance: Normal appearance. She is well-developed and well-groomed. She is not ill-appearing or toxic-appearing. HENT:      Head: Normocephalic. Nose: Nose normal.      Mouth/Throat:      Lips: Pink. No lesions. Pulmonary:      Effort: Pulmonary effort is normal. No respiratory distress. Skin:     Coloration: Skin is not cyanotic, jaundiced or pale. Neurological:      Mental Status: She is alert and oriented to person, place, and time. Psychiatric:         Attention and Perception: Attention and perception normal.         Mood and Affect: Mood and affect normal.         Speech: Speech normal.         Behavior: Behavior normal. Behavior is cooperative. Thought Content: Thought content normal. Thought content does not include suicidal ideation. Thought content does not include suicidal plan.          Cognition and Memory: Cognition and memory normal.         Judgment: Judgment normal.       Due to this being a TeleHealth encounter, evaluation of the following organ systems is limited: Vitals/Constitutional/EENT/Resp/CV/GI//MS/Neuro/Skin/Heme-Lymph-Imm. Medical history    [x] Laboratory Results, Vital signs, Imaging, Active Problems, Immunizations, Current/Recently Discontinued Medications, Health Maintenance Activities Due, Referral Notes (if available) were reviewed per writer       This is a telehealth visit that was performed with the originating site at Patient Location: home and Provider Location of [place], [place]. Verbal consent to participate in video visit was obtained. Pursuant to the emergency declaration under the [facility], [facility] waiver authority and the [employer] Act, this Virtual Visit was conducted, with patient's consent, to reduce the patient's risk of exposure to COVID-19 and provide continuity of care for an established/new patient. Services were provided through a video synchronous discussion virtually to substitute for in-person clinic visit. I discussed with the patient the nature of our telehealth visits via interactive/real-time audio/video that:  - I would evaluate the patient and recommend diagnostics and treatments based on my assessment  - Our sessions are not being recorded and that personal health information is protected  - Our team would provide follow up care in person if/when the patient needs it. Services were provided through a video synchronous discussion virtually to substitute for in-person clinic visit. Electronically signed by GIO Morejon CNP, APRN-CNP on 6/30/2021 at 5:25 PM    This note is created with the assistance of a speech-recognition program. While intending to generate a document that actually reflects the content of the visit, no guarantees can be provided that every mistake has been identified and corrected by editing.

## 2021-06-24 ENCOUNTER — OFFICE VISIT (OUTPATIENT)
Dept: ORTHOPEDIC SURGERY | Age: 26
End: 2021-06-24

## 2021-06-24 VITALS
DIASTOLIC BLOOD PRESSURE: 70 MMHG | WEIGHT: 192 LBS | SYSTOLIC BLOOD PRESSURE: 99 MMHG | HEIGHT: 63 IN | BODY MASS INDEX: 34.02 KG/M2 | RESPIRATION RATE: 12 BRPM | HEART RATE: 74 BPM

## 2021-06-24 DIAGNOSIS — M22.01 RECURRENT DISLOCATION OF PATELLA, RIGHT: ICD-10-CM

## 2021-06-24 DIAGNOSIS — Z98.890 S/P RIGHT KNEE SURGERY: Primary | ICD-10-CM

## 2021-06-24 DIAGNOSIS — M25.561 RIGHT KNEE PAIN, UNSPECIFIED CHRONICITY: Primary | ICD-10-CM

## 2021-06-24 PROCEDURE — 99024 POSTOP FOLLOW-UP VISIT: CPT | Performed by: ORTHOPAEDIC SURGERY

## 2021-06-24 RX ORDER — OXYCODONE HYDROCHLORIDE AND ACETAMINOPHEN 5; 325 MG/1; MG/1
1-2 TABLET ORAL EVERY 6 HOURS PRN
Qty: 36 TABLET | Refills: 0 | Status: SHIPPED | OUTPATIENT
Start: 2021-06-24 | End: 2021-07-13 | Stop reason: SDUPTHER

## 2021-06-24 ASSESSMENT — ENCOUNTER SYMPTOMS
NAUSEA: 0
ABDOMINAL PAIN: 0
COLOR CHANGE: 0
APNEA: 0
ABDOMINAL DISTENTION: 0
DIARRHEA: 0
RESPIRATORY NEGATIVE: 1
SHORTNESS OF BREATH: 0
VOMITING: 0
CONSTIPATION: 0
COUGH: 0
CHEST TIGHTNESS: 0

## 2021-06-24 NOTE — PROGRESS NOTES
[facility]  [address] [place] [id number]  Dept: [phone]  Dept Fax: [phone]        Post Operative Follow Up    Subjective:     Chief Complaint   Patient presents with    Post-Op Check     R knee scope dos 6/18/21     Post Op Surgery:     The patient is here for a follow up after having a knee arthroscopy with debridement, lateral release, and tibial tubercle osteotomy. The date of surgery was on 6/18/2021. Therefore we are 6 days post op. Currently the patient's pain is controlled with Percocet. Physical therapy was not started. Patient presents today with wheelchair and crutches that is in good repair. Patient states they are doing ok but she is having posterior knee pain. Review of Systems   Constitutional: Positive for activity change. Negative for appetite change, fatigue and fever. Respiratory: Negative. Negative for apnea, cough, chest tightness and shortness of breath. Cardiovascular: Negative. Negative for chest pain, palpitations and leg swelling. Gastrointestinal: Negative for abdominal distention, abdominal pain, constipation, diarrhea, nausea and vomiting. Genitourinary: Negative for difficulty urinating, dysuria and hematuria. Musculoskeletal: Positive for arthralgias, gait problem and joint swelling. Negative for myalgias. Skin: Negative for color change and rash. Neurological: Negative for dizziness, weakness, numbness and headaches. Psychiatric/Behavioral: Positive for sleep disturbance. I have reviewed the CC, HPI, ROS, PMH, FHX, Social History, and if not present in this note, I have reviewed in the patient's chart. I agree with the documentation provided by other staff and have reviewed their documentation prior to providing my signature indicating agreement.   Vitals:   BP 99/70   Pulse 74   Resp 12   Ht 5' 3\" (1.6 m)   Wt 192 lb (87.1 kg)   BMI 34.01 kg/m²  Body mass index is 34.01 kg/m². Physical Examination:     Orthopedics:    GENERAL: Alert and oriented X3 in no acute distress. SKIN: Intact without lesions or ulcerations. Incision is healing well with no signs of infection. NEURO: Intact to sensory and motor testing. VASC: Capillary refill is less than 3 seconds. Post Op Exam:    LOCATION: Right knee  SITE: Distal neurocirculatory status is intact. EXAM: Sensation is intact to light touch, there is full motor function of the extremity. ROM: 10/75 degrees   Procedures:     Procedure:  No    Radiology:   XR KNEE RIGHT (1-2 VIEWS)    Result Date: 6/28/2021  2 views including AP and lateral nonweightbearing of the right knee reveal postop surgical changes of a tibial osteotomy. Hardware is in acceptable position with no signs of migration. Impression: Stable postop surgical changes of the right knee     Assessment:     1. S/P right knee surgery    2. Recurrent dislocation of patella, right      Plan:   Post Op Treatment : Patient had the treatment regimen reviewed today 1 weeks s/p Right knee tibial tubercle osteotomy   . I reviewed the films with the patient. We discussed some of the etiologies and natural histories of post AMZ . We also discussed the various treatment alternatives including anti-inflammatory medications, physical therapy, injections, further imaging studies and as a last resort surgery. During today's visit, we described the post op course. The patient then stated that they understand the plan and at this time, the patient has opted post op protocal, non weight bearing no active knee extension . An Rx refill of Percocet was given . Patient should return to the office in 2 weeks with PCXR NWB including patella sunrise to f/u with  Senge Lipoma PA-C and at that visit, patient should be passively 0 to 90 degrees . The patient will call the office immediately with any problems that may arise.      Orders Placed This Encounter   Medications    oxyCODONE-acetaminophen (PERCOCET) 5-325 MG per tablet     Sig: Take 1-2 tablets by mouth every 6 hours as needed for Pain for up to 7 days. Dispense:  36 tablet     Refill:  0     Reduce doses taken as pain becomes manageable       Orders Placed This Encounter   Procedures    External Referral To Physical Therapy     Referral Priority:   Routine     Referral Type:   Eval and Treat     Referral Reason:   Specialty Services Required     Requested Specialty:   Physical Therapy     Number of Visits Requested:   1       Madelyn JAIME PA-C am scribing for and in the presence of Nino Mccain D.O.. 6/29/2021  4:56 PM    INino DO, have personally seen this patient and I have reviewed the CC, PMH, FHX and Social History as provided by other clinical staff. I reassessed the HPI and ROS as scribed by Madelyn Hodgson PA-C in my presence and it is both accurate and complete. Thereafter, I personally performed the PE, reviewed the imaging and established the DX and POC. I agree with the documentation provided by the Physician Assistant. I have reviewed all documentation in its entirety prior to providing my signature indicating agreement. Any areas of disagreement are noted on the chart.     Electronically signed by Patrick Perales DO on 6/29/2021 at 4:59 PM    Electronically signed by Patrick Perales DO, on 6/29/2021 at 4:56 PM

## 2021-06-30 ENCOUNTER — ANESTHESIA EVENT (OUTPATIENT)
Dept: ENDOSCOPY | Age: 26
End: 2021-06-30
Payer: MEDICARE

## 2021-07-01 ENCOUNTER — HOSPITAL ENCOUNTER (OUTPATIENT)
Age: 26
Setting detail: OUTPATIENT SURGERY
Discharge: HOME | End: 2021-07-01
Attending: INTERNAL MEDICINE | Admitting: INTERNAL MEDICINE
Payer: MEDICARE

## 2021-07-01 ENCOUNTER — ANESTHESIA (OUTPATIENT)
Dept: ENDOSCOPY | Age: 26
End: 2021-07-01
Payer: MEDICARE

## 2021-07-01 VITALS
HEART RATE: 68 BPM | TEMPERATURE: 97.1 F | SYSTOLIC BLOOD PRESSURE: 108 MMHG | RESPIRATION RATE: 16 BRPM | OXYGEN SATURATION: 97 % | WEIGHT: 190 LBS | BODY MASS INDEX: 33.66 KG/M2 | HEIGHT: 63 IN | DIASTOLIC BLOOD PRESSURE: 65 MMHG

## 2021-07-01 VITALS — SYSTOLIC BLOOD PRESSURE: 124 MMHG | OXYGEN SATURATION: 100 % | DIASTOLIC BLOOD PRESSURE: 86 MMHG

## 2021-07-01 DIAGNOSIS — R63.4 WEIGHT LOSS: Primary | ICD-10-CM

## 2021-07-01 PROCEDURE — 43239 EGD BIOPSY SINGLE/MULTIPLE: CPT | Performed by: INTERNAL MEDICINE

## 2021-07-01 PROCEDURE — 7100000010 HC PHASE II RECOVERY - FIRST 15 MIN: Performed by: INTERNAL MEDICINE

## 2021-07-01 PROCEDURE — 7100000030 HC ASPR PHASE II RECOVERY - FIRST 15 MIN: Performed by: INTERNAL MEDICINE

## 2021-07-01 PROCEDURE — 6370000000 HC RX 637 (ALT 250 FOR IP): Performed by: ANESTHESIOLOGY

## 2021-07-01 PROCEDURE — 45380 COLONOSCOPY AND BIOPSY: CPT | Performed by: INTERNAL MEDICINE

## 2021-07-01 PROCEDURE — 7100000001 HC PACU RECOVERY - ADDTL 15 MIN: Performed by: INTERNAL MEDICINE

## 2021-07-01 PROCEDURE — 88305 TISSUE EXAM BY PATHOLOGIST: CPT

## 2021-07-01 PROCEDURE — 7100000000 HC PACU RECOVERY - FIRST 15 MIN: Performed by: INTERNAL MEDICINE

## 2021-07-01 PROCEDURE — 3609010300 HC COLONOSCOPY W/BIOPSY SINGLE/MULTIPLE: Performed by: INTERNAL MEDICINE

## 2021-07-01 PROCEDURE — 6360000002 HC RX W HCPCS: Performed by: NURSE ANESTHETIST, CERTIFIED REGISTERED

## 2021-07-01 PROCEDURE — 3609012400 HC EGD TRANSORAL BIOPSY SINGLE/MULTIPLE: Performed by: INTERNAL MEDICINE

## 2021-07-01 PROCEDURE — 7100000031 HC ASPR PHASE II RECOVERY - ADDTL 15 MIN: Performed by: INTERNAL MEDICINE

## 2021-07-01 PROCEDURE — 7100000011 HC PHASE II RECOVERY - ADDTL 15 MIN: Performed by: INTERNAL MEDICINE

## 2021-07-01 PROCEDURE — 3700000001 HC ADD 15 MINUTES (ANESTHESIA): Performed by: INTERNAL MEDICINE

## 2021-07-01 PROCEDURE — 3700000000 HC ANESTHESIA ATTENDED CARE: Performed by: INTERNAL MEDICINE

## 2021-07-01 PROCEDURE — 2709999900 HC NON-CHARGEABLE SUPPLY: Performed by: INTERNAL MEDICINE

## 2021-07-01 PROCEDURE — 45384 COLONOSCOPY W/LESION REMOVAL: CPT | Performed by: INTERNAL MEDICINE

## 2021-07-01 PROCEDURE — 2580000003 HC RX 258: Performed by: ANESTHESIOLOGY

## 2021-07-01 RX ORDER — PROPOFOL 10 MG/ML
INJECTION, EMULSION INTRAVENOUS CONTINUOUS PRN
Status: DISCONTINUED | OUTPATIENT
Start: 2021-07-01 | End: 2021-07-01 | Stop reason: SDUPTHER

## 2021-07-01 RX ORDER — METOCLOPRAMIDE HYDROCHLORIDE 5 MG/ML
10 INJECTION INTRAMUSCULAR; INTRAVENOUS
Status: DISCONTINUED | OUTPATIENT
Start: 2021-07-01 | End: 2021-07-01 | Stop reason: HOSPADM

## 2021-07-01 RX ORDER — PROMETHAZINE HYDROCHLORIDE 12.5 MG/1
12.5 TABLET ORAL EVERY 6 HOURS PRN
COMMUNITY
End: 2021-11-29 | Stop reason: ALTCHOICE

## 2021-07-01 RX ORDER — DIPHENHYDRAMINE HYDROCHLORIDE 50 MG/ML
12.5 INJECTION INTRAMUSCULAR; INTRAVENOUS
Status: DISCONTINUED | OUTPATIENT
Start: 2021-07-01 | End: 2021-07-01 | Stop reason: HOSPADM

## 2021-07-01 RX ORDER — PROMETHAZINE HYDROCHLORIDE 25 MG/ML
6.25 INJECTION, SOLUTION INTRAMUSCULAR; INTRAVENOUS
Status: DISCONTINUED | OUTPATIENT
Start: 2021-07-01 | End: 2021-07-01 | Stop reason: CLARIF

## 2021-07-01 RX ORDER — SCOLOPAMINE TRANSDERMAL SYSTEM 1 MG/1
1 PATCH, EXTENDED RELEASE TRANSDERMAL ONCE
Status: DISCONTINUED | OUTPATIENT
Start: 2021-07-01 | End: 2021-07-01 | Stop reason: HOSPADM

## 2021-07-01 RX ORDER — 0.9 % SODIUM CHLORIDE 0.9 %
500 INTRAVENOUS SOLUTION INTRAVENOUS
Status: DISCONTINUED | OUTPATIENT
Start: 2021-07-01 | End: 2021-07-01 | Stop reason: HOSPADM

## 2021-07-01 RX ORDER — FENTANYL CITRATE 50 UG/ML
25 INJECTION, SOLUTION INTRAMUSCULAR; INTRAVENOUS EVERY 5 MIN PRN
Status: DISCONTINUED | OUTPATIENT
Start: 2021-07-01 | End: 2021-07-01 | Stop reason: HOSPADM

## 2021-07-01 RX ORDER — MEPERIDINE HYDROCHLORIDE 25 MG/ML
12.5 INJECTION INTRAMUSCULAR; INTRAVENOUS; SUBCUTANEOUS EVERY 5 MIN PRN
Status: DISCONTINUED | OUTPATIENT
Start: 2021-07-01 | End: 2021-07-01 | Stop reason: HOSPADM

## 2021-07-01 RX ORDER — LABETALOL HYDROCHLORIDE 5 MG/ML
5 INJECTION, SOLUTION INTRAVENOUS EVERY 10 MIN PRN
Status: DISCONTINUED | OUTPATIENT
Start: 2021-07-01 | End: 2021-07-01 | Stop reason: HOSPADM

## 2021-07-01 RX ORDER — HYDRALAZINE HYDROCHLORIDE 20 MG/ML
5 INJECTION INTRAMUSCULAR; INTRAVENOUS EVERY 10 MIN PRN
Status: DISCONTINUED | OUTPATIENT
Start: 2021-07-01 | End: 2021-07-01 | Stop reason: HOSPADM

## 2021-07-01 RX ORDER — SODIUM CHLORIDE, SODIUM LACTATE, POTASSIUM CHLORIDE, CALCIUM CHLORIDE 600; 310; 30; 20 MG/100ML; MG/100ML; MG/100ML; MG/100ML
INJECTION, SOLUTION INTRAVENOUS CONTINUOUS
Status: DISCONTINUED | OUTPATIENT
Start: 2021-07-01 | End: 2021-07-01 | Stop reason: HOSPADM

## 2021-07-01 RX ORDER — LIDOCAINE HYDROCHLORIDE 10 MG/ML
1 INJECTION, SOLUTION EPIDURAL; INFILTRATION; INTRACAUDAL; PERINEURAL
Status: DISCONTINUED | OUTPATIENT
Start: 2021-07-01 | End: 2021-07-01 | Stop reason: HOSPADM

## 2021-07-01 RX ORDER — OXYCODONE HYDROCHLORIDE AND ACETAMINOPHEN 5; 325 MG/1; MG/1
1 TABLET ORAL
Status: DISCONTINUED | OUTPATIENT
Start: 2021-07-01 | End: 2021-07-01 | Stop reason: HOSPADM

## 2021-07-01 RX ADMIN — PROPOFOL 100 MG: 10 INJECTION, EMULSION INTRAVENOUS at 09:57

## 2021-07-01 RX ADMIN — SODIUM CHLORIDE, POTASSIUM CHLORIDE, SODIUM LACTATE AND CALCIUM CHLORIDE: 600; 310; 30; 20 INJECTION, SOLUTION INTRAVENOUS at 09:10

## 2021-07-01 RX ADMIN — PROPOFOL 50 MG: 10 INJECTION, EMULSION INTRAVENOUS at 10:01

## 2021-07-01 RX ADMIN — PROPOFOL 125 MCG/KG/MIN: 10 INJECTION, EMULSION INTRAVENOUS at 09:55

## 2021-07-01 ASSESSMENT — PULMONARY FUNCTION TESTS
PIF_VALUE: 0
PIF_VALUE: 2
PIF_VALUE: 0

## 2021-07-01 ASSESSMENT — PAIN DESCRIPTION - ORIENTATION
ORIENTATION: RIGHT

## 2021-07-01 ASSESSMENT — PAIN DESCRIPTION - DESCRIPTORS
DESCRIPTORS: ACHING

## 2021-07-01 ASSESSMENT — PAIN DESCRIPTION - LOCATION
LOCATION: KNEE

## 2021-07-01 ASSESSMENT — PAIN SCALES - GENERAL
PAINLEVEL_OUTOF10: 2
PAINLEVEL_OUTOF10: 2
PAINLEVEL_OUTOF10: 4

## 2021-07-01 ASSESSMENT — ENCOUNTER SYMPTOMS
VOMITING: 1
SHORTNESS OF BREATH: 1
DIARRHEA: 1
ANAL BLEEDING: 1

## 2021-07-01 ASSESSMENT — PAIN DESCRIPTION - FREQUENCY: FREQUENCY: CONTINUOUS

## 2021-07-01 ASSESSMENT — PAIN DESCRIPTION - PAIN TYPE
TYPE: OTHER (COMMENT)
TYPE: CHRONIC PAIN
TYPE: CHRONIC PAIN

## 2021-07-01 ASSESSMENT — PAIN - FUNCTIONAL ASSESSMENT: PAIN_FUNCTIONAL_ASSESSMENT: 0-10

## 2021-07-01 NOTE — OP NOTE
Operative Note    PROCEDURE NOTE    DATE OF PROCEDURE: 7/1/2021     SURGEON: Patience Guadalupe MD  Facility: [facility]  ASSISTANT: None  Anesthesia: mac   PREOPERATIVE DIAGNOSIS:   Bilious vomiting  Weight loss  Diarrhea    Diagnosis:  Patient has some edema at the GE junction and look like more of the folds are been swollen on retroflex please see the images I went ahead and look between the 4 there is a tiny little ulcer I took a biopsy from it measuring around 5 mm        Stomach mild inflammation, biopsies were taken    Random small bowel biopsies were taken            POSTOPERATIVE DIAGNOSIS: As described below    OPERATION: Upper GI endoscopy with Biopsy    ANESTHESIA: Moderate Sedation     ESTIMATED BLOOD LOSS: Less than 50 ml    COMPLICATIONS: None. SPECIMENS:  Was Obtained:   Patient has some edema at the GE junction and look like more of the folds are been swollen on retroflex please see the images I went ahead and look between the 4 there is a tiny little ulcer I took a biopsy from it measuring around 5 mm        Stomach mild inflammation, biopsies were taken    Random small bowel biopsies were taken      HISTORY: The patient is a 30y.o. year old female with history of above preop diagnosis. I recommended esophagogastroduodenoscopy with possible biopsy and I explained the risk, benefits, expected outcome, and alternatives to the procedure. Risks included but are not limited to bleeding, infection, respiratory distress, hypotension, and perforation of the esophagus, stomach, or duodenum. Patient understands and is in agreement. The patient was counseled at length about the risks of lawson Covid-19 during their perioperative period and any recovery window from their procedure. The patient was made aware that lawson Covid-19  may worsen their prognosis for recovering from their procedure  and lend to a higher morbidity and/or mortality risk.   All material risks, benefits, and reasonable alternatives including postponing the procedure were discussed. The patient does wish to proceed with the procedure at this time. PROCEDURE: The patient was given IV conscious sedation. The patient's SPO2 remained above 90% throughout the procedure. The gastroscope was inserted orally and advanced under direct vision through the esophagus, through the stomach, through the pylorus, and into the descending duodenum. Post sedation note : The patient's SPO2 remained above 90% throughout the procedure. the vital signs remained stable , and no immediate complication form the procedure noted, patient will be ready for d/c when criteria is met . Findings:    Retropharyngeal area was grossly normal appearing    Esophagus: abnormal: Patient has some edema at the GE junction and look like more of the folds are been swollen on retroflex please see the images I went ahead and look between the 4 there is a tiny little ulcer I took a biopsy from it measuring around 5 mm                Stomach:    Fundus: normal    Body: abnormal: Stomach mild inflammation, biopsies were taken    Antrum: abnormal: Stomach mild inflammation, biopsies were taken  There was a pulsating compressing into the in the upper part of the stomach not sure if this is a reflection of the heart or the aorta  Or a different entity for which we will get a CAT scan of the chest and abdomen and pelvis    Duodenum:     Descending: abnormal: Random small bowel biopsies were taken    Bulb: abnormal: Random small bowel biopsies were taken    The scope was removed and the patient tolerated the procedure well. Recommendations/Plan:   1. F/U Biopsies  2. F/U In Office in 3-4 weeks  3. Discussed with the family  4.  CT of the chest abdomen and pelvis    Electronically signed by Brayden Washington MD  on 7/1/2021 at 10:04 AM

## 2021-07-01 NOTE — H&P
HISTORY and [id number]       NAME:  Roes Matos  MRN: [id number]   YOB: 1995   Date: 7/1/2021   Age: 30 y.o. Gender: female       COMPLAINT AND PRESENT HISTORY:   Rose Matos is 30 y.o.,  female, will be having a Colonoscopy and EGD per Dr Ana Singh   No Prior Colonoscopy  and EGD was done. Pre diagnosis: DIARRHEA  HPI:   Pt present today with her mother, the mother doing all the talking during the H&P examination. Patient has positive FH of Colon Cancer in grandfather , no FH of esophageal cancer   Patient reports changes in bowel habits. She complian of diarrhea with vomiting  started over the last year,  has to use the bathroom five time per day. The mother states that her daughter vomite after she eat( any kind of food) any time during the day, some time she vomit coffee ground emesis. Per her mother,she lost  100 pound over the last year. Pt notice she has some blood on the tissue after she wipe her self every time after bowel movement. She has no  experiencing red/ black/ BRBPR stools. Patient has no history or complaining of abd. Pain. Patient denies any Dysphagia. The mother states that her daughter  has foul smelling burps    Pt has no  hx of GERD , she denies any heartburn. Pt  Denies fever/chills, chest pain or palpitation , no SOB. Review of additional significant medical hx:  Seizures :  Pt had on seizure in early adolescence none since.     Lab Results   Component Value Date    WBC 10.5 06/19/2021    HGB 12.3 06/19/2021    HCT 38.6 06/19/2021    MCV 99.5 06/19/2021     06/19/2021     Lab Results   Component Value Date     06/19/2021    K 3.7 06/19/2021     06/19/2021    CO2 23 06/19/2021    BUN 9 06/19/2021    CREATININE 0.66 06/19/2021    GLUCOSE 89 06/19/2021    CALCIUM 8.9 06/19/2021      BP Readings from Last 3 Encounters:   06/24/21 99/70   06/19/21 (!) 92/50   06/18/21 137/78       NPO status: Single     Spouse name: Not on file    Number of children: Not on file    Years of education: Not on file    Highest education level: Not on file   Occupational History    Not on file   Tobacco Use    Smoking status: Former Smoker     Packs/day: 1.00     Years: 5.00     Pack years: 5.00     Types: Cigarettes     Quit date: 2021     Years since quittin.4    Smokeless tobacco: Never Used   Vaping Use    Vaping Use: Every day    Substances: Nicotine   Substance and Sexual Activity    Alcohol use: No     Alcohol/week: 0.0 standard drinks     Comment: 2016-stopped drinking    Drug use: Yes     Frequency: 7.0 times per week     Types: Marijuana     Comment: 2016-daily marijuana use, improves appetite and helps her sleep    Sexual activity: Yes     Partners: Male     Birth control/protection: Condom   Other Topics Concern    Not on file   Social History Narrative    Not on file     Social Determinants of Health     Financial Resource Strain: Low Risk     Difficulty of Paying Living Expenses: Not hard at all   Food Insecurity: No Food Insecurity    Worried About Running Out of Food in the Last Year: Never true    Jass of Food in the Last Year: Never true   Transportation Needs: Unmet Transportation Needs    Lack of Transportation (Medical):  Yes    Lack of Transportation (Non-Medical): Yes   Physical Activity:     Days of Exercise per Week:     Minutes of Exercise per Session:    Stress:     Feeling of Stress :    Social Connections:     Frequency of Communication with Friends and Family:     Frequency of Social Gatherings with Friends and Family:     Attends Oriental orthodox Services:     Active Member of Clubs or Organizations:     Attends Club or Organization Meetings:     Marital Status:    Intimate Partner Violence:     Fear of Current or Ex-Partner:     Emotionally Abused:     Physically Abused:     Sexually Abused:            REVIEW OF SYSTEMS      Allergies   Allergen Reactions  Latex Rash    Poison Ivy Extract Rash     \"was one big welt\"    Sulfa Antibiotics Anaphylaxis       No current facility-administered medications on file prior to encounter. Current Outpatient Medications on File Prior to Encounter   Medication Sig Dispense Refill    cloNIDine (CATAPRES) 0.2 MG tablet Take 1 tablet by mouth nightly 60 tablet 3    amphetamine-dextroamphetamine (ADDERALL, 10MG,) 10 MG tablet Take 10 mg by mouth daily as needed (if needed at work).  propranolol (INDERAL) 10 MG tablet take 1 tablet by mouth once daily if needed      albuterol sulfate  (90 Base) MCG/ACT inhaler Inhale 2 puffs into the lungs every 6 hours as needed (Patient not taking: Reported on 6/22/2021)         Review of Systems   HENT: Negative. Eyes: Positive for visual disturbance. Respiratory: Positive for shortness of breath. Cardiovascular: Negative. Gastrointestinal: Positive for anal bleeding, diarrhea and vomiting. Genitourinary: Negative. Skin: Negative. Neurological: Negative. Psychiatric/Behavioral: Negative. GENERAL PHYSICAL EXAM     Vitals: see nursing flow sheet for vital signs     GENERAL APPEARANCE:   Chela Machado is 30 y.o.,  female,  nourished, conscious, alert. Does not appear to be distress or pain at this time. Physical Exam  Constitutional:       Appearance: Normal appearance. HENT:      Head: Normocephalic. Nose: Nose normal.      Mouth/Throat:      Mouth: Mucous membranes are moist.   Eyes:      General:         Right eye: No discharge. Left eye: No discharge. Pupils: Pupils are equal, round, and reactive to light. Cardiovascular:      Rate and Rhythm: Normal rate and regular rhythm. Pulses: Normal pulses. Radial pulses are 2+ on the right side and 2+ on the left side. Dorsalis pedis pulses are 2+ on the right side and 2+ on the left side.         Posterior tibial pulses are 2+ on the right side and 2+ on the left side. Heart sounds: Normal heart sounds. Pulmonary:      Effort: Pulmonary effort is normal.      Breath sounds: Normal breath sounds. No wheezing. Abdominal:      General: Bowel sounds are normal.      Palpations: There is no mass. Tenderness: There is no abdominal tenderness. Musculoskeletal:      Cervical back: Normal range of motion and neck supple. Comments: Pt is on wheelchair due to her right knee injury    Skin:     General: Skin is warm and dry. Neurological:      General: No focal deficit present. Mental Status: She is alert and oriented to person, place, and time.    Psychiatric:         Mood and Affect: Mood normal.                   PROVISIONAL DIAGNOSES / SURGERY:      DIARRHEA   EGD  COLONOSCOPY DIAGNOSTIC    Patient Active Problem List    Diagnosis Date Noted    Recurrent dislocation of patella, right knee 06/18/2021    Adult body mass index 40 and over 07/29/2020    Attention deficit disorder without hyperactivity 07/29/2020    Developmental disorder of scholastic skill 07/29/2020    Endometriosis 07/29/2020    Esophageal reflux 07/29/2020    Observation of other suspected mental condition 07/29/2020    Pelvic congestion 07/29/2020    Platelet function defect ([address] 07/29/2020    Morbid obesity ([address] 07/29/2020    PTSD (post-traumatic stress disorder) 12/10/2019    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

## 2021-07-01 NOTE — ANESTHESIA POSTPROCEDURE EVALUATION
Department of Anesthesiology  Postprocedure Note    Patient: Marshall Sterling  MRN: [id number]  YOB: 1995  Date of evaluation: 7/1/2021  Time:  11:41 AM     Procedure Summary     Date: 07/01/21 Room / Location: [facility] [place] ENDO 01 / [facility]    Anesthesia Start: 4507 Anesthesia Stop: 1030    Procedures:       EGD BIOPSY (N/A Esophagus)      COLONOSCOPY POLYPECTOMY HOT BIOPSY (N/A ) Diagnosis:       (DIARRHEA)      (PT VACCINATED)    Surgeons: Slava Ivy MD Responsible Provider: Claudene Grey, MD    Anesthesia Type: MAC ASA Status: 2          Anesthesia Type: MAC    John Phase I: John Score: 10    John Phase II: John Score: 10    Last vitals: Reviewed and per EMR flowsheets.        Anesthesia Post Evaluation    Comments: POST- ANESTHESIA EVALUATION       Pt Name: Marshall Sterling  MRN: [id number]  YOB: 1995  Date of evaluation: 7/1/2021  Time:  11:41 AM      /65   Pulse 67   Temp 97.1 °F (36.2 °C)   Resp 16   Ht 5' 3\" (1.6 m)   Wt 190 lb (86.2 kg)   SpO2 100%   BMI 33.66 kg/m²      Consciousness Level  Awake  Cardiopulmonary Status  Stable  Pain Adequately Treated YES  Nausea / Vomiting  NO  Adequate Hydration  YES  Anesthesia Related Complications NONE      Electronically signed by Claudene Grey, MD on 7/1/2021 at 11:41 AM

## 2021-07-01 NOTE — OP NOTE
PROCEDURE NOTE    DATE OF PROCEDURE: 7/1/2021    SURGEON: Cira Ruiz MD  Facility : [facility]  ASSISTANT: None  Anesthesia: mac   PREOPERATIVE DIAGNOSIS:   Diarrhea  Weight loss  Vomiting    POSTOPERATIVE DIAGNOSIS: as described below    OPERATION: Total colonoscopy     ANESTHESIA: Moderate Sedation    ESTIMATED BLOOD LOSS: less than 50     COMPLICATIONS: None. SPECIMENS:  Was Obtained:   Right colon polyp sessile measuring 9 mm I removed it with hot biopsy forceps  It was in the cecum across from the ileocecal valve    Random biopsies throughout the colon were taken        HISTORY: The patient is a 30y.o. year old female with history of above preop diagnosis. I recommended colonoscopy with possible biopsy or polypectomy and I explained the risk, benefits, expected outcome, and alternatives to the procedure. Risks included but are not limited to bleeding, infection, respiratory distress, hypotension, and perforation of the colon and possibility of missing a lesion. The patient understands and is in agreement. The patient was counseled at length about the risks of lawson Covid-19 during their perioperative period and any recovery window from their procedure. The patient was made aware that lawson Covid-19  may worsen their prognosis for recovering from their procedure  and lend to a higher morbidity and/or mortality risk. All material risks, benefits, and reasonable alternatives including postponing the procedure were discussed. The patient does wish to proceed with the procedure at this time. PROCEDURE: The patient was given IV conscious sedation. The patient's SPO2 remained above 90% throughout the procedure. The colonoscope was inserted per rectum and advanced under direct vision to the cecum without difficulty. Post sedation note : The patient's SPO2 remained above 90% throughout the procedure. the vital signs remained stable , and no immediate complication form the procedure noted, patient will be ready for d/c when criteria is met . The prep was good. Findings:  Terminal ileum: normal    Cecum/Ascending colon: abnormal: Right colon polyp sessile measuring 9 mm I removed it with hot biopsy forceps  It was in the cecum across from the ileocecal valve      Transverse colon: normal    Descending/Sigmoid colon: normal    Rectum/Anus: examined in normal and retroflexed positions and was normal      Random biopsies throughout the colon were taken      Withdrawal Time was (minutes): 10    The colon was decompressed and the scope was removed. The patient tolerated the procedure well. Recommendations/Plan:   1. Lifestyle and dietary modifications as discussed  2. F/U Biopsies  3. F/U In OfficeYes  4. Discussed with the family  5.  Repeat colonoscopy [id number]    Electronically signed by Jacklyn Worthington MD  on 7/1/2021 at 10:23 AM

## 2021-07-01 NOTE — ANESTHESIA PRE PROCEDURE
Facility-Administered Medications   Medication Dose Route Frequency Provider Last Rate Last Admin    lactated ringers infusion   Intravenous Continuous Graham Aldrich MD        lidocaine PF 1 % injection 1 mL  1 mL Intradermal Once PRN Anastasia Sutton MD           Allergies:     Allergies   Allergen Reactions    Latex Rash    Poison Ivy Extract Rash     \"was one big welt\"    Sulfa Antibiotics Anaphylaxis       Problem List:    Patient Active Problem List   Diagnosis Code    Migraine headache G43.909    Vaginal bleeding N93.9    Anxiety F41.9    Vitamin D insufficiency E55.9    PTSD (post-traumatic stress disorder) F43.10    Excessive and frequent menstruation N92.0    Adult body mass index 40 and over [id number]    Attention deficit disorder without hyperactivity F98.8    Calculus of gallbladder with chronic cholecystitis without obstruction K80.10    Developmental disorder of scholastic skill F81.9    Endometriosis N80.9    Esophageal reflux K21.9    Observation of other suspected mental condition Z03.89    Pelvic congestion N94.89    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    Platelet function defect (HCC) D69.1    Morbid obesity (HCC) E66.01    Recurrent dislocation of patella, right knee M22.01       Past Medical History:        Diagnosis Date    Anemia     Anxiety     is treated with Catapres and Inderal; anxiety causes her to pick at her skin    Bipolar disorder ([address]     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[address]     Depression     Hypotension     side effect of Inderal and Catapres taken for anxiety    Learning disability     Non-verbal learning disability, has no real concept of time, money, misinterprets body language    Nausea & vomiting     been going on since June/July 2020, is being worked up by Dr Geni Rai    Obesity     PONV (postoperative nausea and vomiting)     usually has a scopalamine patch for surgery    Seizures ([address]     had one seizure in early adolescence none since    SOB (shortness of breath)     with activity at times, unable to climb a flight of stairs without SOB       Past Surgical History:        Procedure Laterality Date    CHOLECYSTECTOMY, LAPAROSCOPIC  16    ENDOMETRIAL ABLATION  2018    EYE SURGERY Left     cyst removed left eyelid    HERNIA REPAIR      INTRAUTERINE DEVICE INSERTION      removed 2015    KNEE ARTHROSCOPY Right 2021    RIGHT KNEE ARTHROSCOPY WITH DEBRIDEMENT AND LATERAL RELEASE, TIBIAL TUBERCLE OSTEOTOMY performed by Olinda Perea DO at [address]      WISDOM TOOTH EXTRACTION         Social History:    Social History     Tobacco Use    Smoking status: Former Smoker     Packs/day: 1.00     Years: 5.00     Pack years: 5.00     Types: Cigarettes     Quit date: 2021     Years since quittin.4    Smokeless tobacco: Never Used   Substance Use Topics    Alcohol use: No     Alcohol/week: 0.0 standard drinks     Comment: 2016-stopped drinking                                Counseling given: Not Answered      Vital Signs (Current):   Vitals:    21 0840   BP: 102/72   Pulse: 62   Resp: 16   Temp: 97.3 °F (36.3 °C)   TempSrc: Infrared   SpO2: 98%   Weight: 190 lb (86.2 kg)   Height: 5' 3\" (1.6 m)                                              BP Readings from Last 3 Encounters:   21 102/72   21 99/70   21 (!) 92/50       NPO Status: Time of last liquid consumption:                         Time of last solid consumption: 1800                        Date of last liquid consumption: 21                        Date of last solid food consumption: 21    BMI:   Wt Readings from Last 3 Encounters:   21 190 lb (86.2 kg)   21 192 lb (87.1 kg)   21 190 lb (86.2 kg)     Body mass index is 33.66 kg/m².     CBC:   Lab Results   Component Value Date    WBC 10.5 2021    RBC 3.88 2021    HGB 12.3 2021    HCT 38.6 06/19/2021    MCV 99.5 06/19/2021    RDW 13.3 06/19/2021     06/19/2021       CMP:   Lab Results   Component Value Date     06/19/2021    K 3.7 06/19/2021     06/19/2021    CO2 23 06/19/2021    BUN 9 06/19/2021    CREATININE 0.66 06/19/2021    GFRAA >60 06/19/2021    LABGLOM >60 06/19/2021    GLUCOSE 89 06/19/2021    PROT 6.8 04/08/2021    CALCIUM 8.9 06/19/2021    BILITOT 0.5 04/08/2021    ALKPHOS 69 04/08/2021    AST 20 04/08/2021    ALT 24 04/08/2021       POC Tests: No results for input(s): POCGLU, POCNA, POCK, POCCL, POCBUN, POCHEMO, POCHCT in the last 72 hours. Coags:   Lab Results   Component Value Date    PROTIME 12.8 05/28/2021    INR 1.0 05/28/2021    APTT 30.1 05/28/2021       HCG (If Applicable):   Lab Results   Component Value Date    PREGTESTUR  03/11/2019      Comment:      neg    HCG NEGATIVE 06/18/2021        ABGs: No results found for: PHART, PO2ART, PFO8QWW, QNM7OFB, BEART, H4KQMFBC     Type & Screen (If Applicable):  No results found for: LABABO, LABRH    Drug/Infectious Status (If Applicable):  No results found for: HIV, HEPCAB    COVID-19 Screening (If Applicable): No results found for: COVID19        Anesthesia Evaluation  Patient summary reviewed and Nursing notes reviewed   history of anesthetic complications: PONV. Airway: Mallampati: III  TM distance: >3 FB   Neck ROM: full  Mouth opening: > = 3 FB Dental: normal exam         Pulmonary:Negative Pulmonary ROS and normal exam  breath sounds clear to auscultation                             Cardiovascular:Negative CV ROS            Rhythm: regular  Rate: normal                    Neuro/Psych:   (+) seizures:, headaches: migraine headaches, psychiatric history:depression/anxiety             GI/Hepatic/Renal:   (+) GERD:,          ROS comment: N/V  Abdominal pain  Unintentional weight loss. Endo/Other:    (+) blood dyscrasia::., .                 Abdominal:             Vascular: negative vascular ROS.          Other Findings:           Anesthesia Plan      MAC     ASA 2       Induction: intravenous. MIPS: Postoperative opioids intended and Prophylactic antiemetics administered. Anesthetic plan and risks discussed with patient. Plan discussed with CRNA.                   Jourdan Alfaro MD   7/1/2021

## 2021-07-02 ENCOUNTER — TELEPHONE (OUTPATIENT)
Dept: GASTROENTEROLOGY | Age: 26
End: 2021-07-02

## 2021-07-02 LAB — SURGICAL PATHOLOGY REPORT: NORMAL

## 2021-07-02 NOTE — TELEPHONE ENCOUNTER
LVM for patient to call the office to schedule follow up appointment.    egd-colon follow up 2-3 months from 7/1/21

## 2021-07-09 DIAGNOSIS — E55.9 VITAMIN D INSUFFICIENCY: ICD-10-CM

## 2021-07-09 DIAGNOSIS — G43.009 MIGRAINE WITHOUT AURA AND WITHOUT STATUS MIGRAINOSUS, NOT INTRACTABLE: ICD-10-CM

## 2021-07-09 DIAGNOSIS — K92.0 COFFEE GROUND EMESIS: ICD-10-CM

## 2021-07-09 RX ORDER — ONDANSETRON 4 MG/1
4 TABLET, ORALLY DISINTEGRATING ORAL EVERY 8 HOURS PRN
Qty: 20 TABLET | Refills: 0 | Status: SHIPPED | OUTPATIENT
Start: 2021-07-09 | End: 2021-08-12 | Stop reason: SDUPTHER

## 2021-07-09 RX ORDER — BUTALBITAL, ACETAMINOPHEN AND CAFFEINE 50; 325; 40 MG/1; MG/1; MG/1
1 TABLET ORAL EVERY 4 HOURS PRN
Qty: 20 TABLET | Refills: 0 | Status: SHIPPED | OUTPATIENT
Start: 2021-07-09 | End: 2021-08-12 | Stop reason: SDUPTHER

## 2021-07-09 RX ORDER — ERGOCALCIFEROL 1.25 MG/1
50000 CAPSULE ORAL WEEKLY
Qty: 12 CAPSULE | Refills: 1 | Status: SHIPPED | OUTPATIENT
Start: 2021-07-09 | End: 2021-11-29 | Stop reason: SDUPTHER

## 2021-07-09 NOTE — TELEPHONE ENCOUNTER
Last visit: 06/22/2021  Last Med refill: 06/22/2021  Does patient have enough medication for 72 hours: Yes    Next Visit Date:  No future appointments. Health Maintenance   Topic Date Due    Hepatitis C screen  Never done    Cervical cancer screen  Never done    Flu vaccine (1) 09/01/2021    DTaP/Tdap/Td vaccine (2 - Td or Tdap) 03/06/2029    COVID-19 Vaccine  Completed    HIV screen  Completed    Hepatitis A vaccine  Aged Out    Hepatitis B vaccine  Aged Out    Hib vaccine  Aged Out    Meningococcal (ACWY) vaccine  Aged Out    Pneumococcal 0-64 years Vaccine  Aged Out    Varicella vaccine  Discontinued       Hemoglobin A1C (%)   Date Value   04/08/2021 5.2   07/29/2020 5.3   12/09/2019 5.4             ( goal A1C is < 7)   No results found for: LABMICR  LDL Cholesterol (mg/dL)   Date Value   07/29/2020 127   12/09/2019 129     LDL Calculated (mg/dL)   Date Value   04/08/2021 172 (H)       (goal LDL is <100)   AST (U/L)   Date Value   04/08/2021 20     ALT (U/L)   Date Value   04/08/2021 24     BUN (mg/dL)   Date Value   06/19/2021 9     BP Readings from Last 3 Encounters:   07/01/21 124/86   07/01/21 108/65   06/24/21 99/70          (goal 120/80)    All Future Testing planned in [facility]  Lab Frequency Next Occurrence   XR KNEE RIGHT (1-2 VIEWS) Once 04/08/2022   Lactate Dehydrogenase Once 04/08/2022   Calprotectin Stool Once 04/08/2022   Clostridium Difficile Toxin/Antigen Once 04/08/2022   Gastrointestinal Panel, Molecular Once 04/08/2022   Fecal lactoferrin Once 04/08/2022   Blood Occult Stool #1 Once 04/08/2022   H.  Pylori Antigen, EIA Once 04/08/2022   Genesight Psychotropic (combinatorial pharmacogenomics test) Once 04/08/2022   EGD Once 05/27/2021   COLONOSCOPY W/ OR W/O BIOPSY Once 05/27/2021   CT CHEST ABDOMEN PELVIS W CONTRAST Once 07/01/2021               Patient Active Problem List:     Migraine headache     Vaginal bleeding     Anxiety     Vitamin D insufficiency     PTSD (post-traumatic stress disorder)     Excessive and frequent menstruation     Adult body mass index 40 and over     Attention deficit disorder without hyperactivity     Calculus of gallbladder with chronic cholecystitis without obstruction     Developmental disorder of scholastic skill     Endometriosis     Esophageal reflux     Observation of other suspected mental condition     Pelvic congestion     Platelet dense granule deficiency (HCC)     Platelet function defect ([address]     Morbid obesity ([address]     Recurrent dislocation of patella, right knee

## 2021-07-13 DIAGNOSIS — Z98.890 S/P RIGHT KNEE SURGERY: ICD-10-CM

## 2021-07-13 RX ORDER — OXYCODONE HYDROCHLORIDE AND ACETAMINOPHEN 5; 325 MG/1; MG/1
1-2 TABLET ORAL EVERY 6 HOURS PRN
Qty: 28 TABLET | Refills: 0 | Status: SHIPPED | OUTPATIENT
Start: 2021-07-13 | End: 2021-07-20

## 2021-07-20 ENCOUNTER — OFFICE VISIT (OUTPATIENT)
Dept: ORTHOPEDIC SURGERY | Age: 26
End: 2021-07-20

## 2021-07-20 VITALS
BODY MASS INDEX: 33.66 KG/M2 | SYSTOLIC BLOOD PRESSURE: 106 MMHG | HEIGHT: 63 IN | WEIGHT: 190 LBS | DIASTOLIC BLOOD PRESSURE: 73 MMHG | HEART RATE: 83 BPM | RESPIRATION RATE: 14 BRPM

## 2021-07-20 DIAGNOSIS — M22.01 RECURRENT DISLOCATION OF PATELLA, RIGHT KNEE: Primary | ICD-10-CM

## 2021-07-20 DIAGNOSIS — Z98.890 S/P RIGHT KNEE SURGERY: Primary | ICD-10-CM

## 2021-07-20 PROCEDURE — 99024 POSTOP FOLLOW-UP VISIT: CPT | Performed by: PHYSICIAN ASSISTANT

## 2021-07-20 NOTE — PROGRESS NOTES
[facility]  [address] [facility] [address]  Dept: [phone]  Dept Fax: [phone]        Post Operative Follow Up    Subjective:     Chief Complaint   Patient presents with    Post-Op Check     R knee scope dos 6/18/21     Post Op Surgery:     The patient is here for a follow up after having a right knee arthroscopy with debridement, lateral release, and tibial tubercle osteotomy. The date of surgery was on 6/18/2021. Therefore we are 5 weeks post op. Currently the patient's pain is controlled with Percocet very rarely. Physical therapy was started. Patient presents today with crutches and brace that is in good repair. Patient states they are doing much better. She would like to know if it safe for her not to use the brace at night. Review of Systems   Constitutional: Positive for activity change. Negative for appetite change, fatigue and fever. Respiratory: Negative. Negative for apnea, cough, chest tightness and shortness of breath. Cardiovascular: Negative. Negative for chest pain, palpitations and leg swelling. Gastrointestinal: Negative for abdominal distention, abdominal pain, constipation, diarrhea, nausea and vomiting. Genitourinary: Negative for difficulty urinating, dysuria and hematuria. Musculoskeletal: Positive for arthralgias, gait problem and joint swelling. Negative for myalgias. Skin: Negative for color change and rash. Neurological: Negative for dizziness, weakness, numbness and headaches. Psychiatric/Behavioral: Positive for sleep disturbance. I have reviewed the CC, HPI, ROS, PMH, FHX, Social History, and if not present in this note, I have reviewed in the patient's chart. I agree with the documentation provided by other staff and have reviewed their documentation prior to providing my signature indicating agreement.   Vitals:   /73   Pulse 83   Resp 14   Ht 5' 3\" (1.6 m) Wt 190 lb (86.2 kg)   BMI 33.66 kg/m²  Body mass index is 33.66 kg/m². Physical Examination:     Orthopedics:    GENERAL: Alert and oriented X3 in no acute distress. SKIN: Intact without lesions or ulcerations. Incision is healing well with no signs of infection. NEURO: Intact to sensory and motor testing. VASC: Capillary refill is less than 3 seconds. Post Op Exam:    LOCATION: Right knee  SITE: Distal neurocirculatory status is intact. EXAM: Sensation is intact to light touch, there is full motor function of the extremity. ROM: 0/90 degrees  Procedures:     Procedure: yes, sutures removed    Radiology:   KNEE X-RAY    4 views of the right knee including AP, bilateral tunnel, and lateral in the upright position, and skyline views reveal anatomic alignment with no fracture or dislocation. Kellgren grade II changes of osteoarthritis (joint space narrowing, osteophyte, subchondral sclerosis, bony deformity/cyst) of the tricompartment(s). No osseous loose bodies. No bony erosion or periosteal reaction. No soft tissue masses. Postop surgical changes of a tibial osteotomy with bone graft that looks in acceptable position with progressive healing. Impression: Stable postop surgical changes of the left knee. Assessment:     1. S/P right knee surgery      Plan:   Post Op Treatment : Patient had the treatment regimen reviewed today 5 weeks s/p a right knee arthroscopy with debridement, lateral release, and tibial tubercle osteotomy. I reviewed the films with the patient. We discussed some of the etiologies and natural histories of recovery after tibial osteotomy. We also discussed the various treatment alternatives including anti-inflammatory medications, physical therapy, injections, further imaging studies and as a last resort surgery. During today's visit, we discussed the that I am happy with her motion.   Over the next 2 weeks she can begin 20% weightbearing with her brace locked in extension. Then in 2 weeks, she can begin weightbearing as tolerated with 2 crutches and the brace locked in extension. She can then moved to 1 crutch if she has no pain and then to no crutches. She does not need to sleep in the brace. Patient should return to the office in 4 weeks with PCXR to f/u with  Rashida Dexter PA-C. The patient will call the office immediately with any problems that may arise. No orders of the defined types were placed in this encounter. Orders Placed This Encounter   Procedures    External Referral To Physical Therapy     Referral Priority:   Routine     Referral Type:   Eval and Treat     Referral Reason:   Specialty Services Required     Requested Specialty:   Physical Therapy     Number of Visits Requested:   1       I, Rashida Dexter PA-C, have personally seen this patient, reviewed the chart including history, and imaging if done. I personally  performed the physical exam and obtained any needed additional history. I placed orders, performed or supervised procedures and developed the treatment plan.     Electronically signed by Lee Buckley PA-C, on 7/21/2021 at 1:39 PM      Electronically signed by Lee Buckley PA-C, on 7/21/2021 at 1:38 PM

## 2021-07-21 ASSESSMENT — ENCOUNTER SYMPTOMS
APNEA: 0
SHORTNESS OF BREATH: 0
CHEST TIGHTNESS: 0
DIARRHEA: 0
NAUSEA: 0
CONSTIPATION: 0
COUGH: 0
ABDOMINAL PAIN: 0
ABDOMINAL DISTENTION: 0
COLOR CHANGE: 0
RESPIRATORY NEGATIVE: 1
VOMITING: 0

## 2021-08-12 DIAGNOSIS — K92.0 COFFEE GROUND EMESIS: ICD-10-CM

## 2021-08-12 DIAGNOSIS — G43.009 MIGRAINE WITHOUT AURA AND WITHOUT STATUS MIGRAINOSUS, NOT INTRACTABLE: ICD-10-CM

## 2021-08-12 DIAGNOSIS — E55.9 VITAMIN D INSUFFICIENCY: ICD-10-CM

## 2021-08-13 RX ORDER — BUTALBITAL, ACETAMINOPHEN AND CAFFEINE 50; 325; 40 MG/1; MG/1; MG/1
1 TABLET ORAL EVERY 4 HOURS PRN
Qty: 20 TABLET | Refills: 0 | Status: SHIPPED | OUTPATIENT
Start: 2021-08-13 | End: 2021-09-16 | Stop reason: SDUPTHER

## 2021-08-13 RX ORDER — ERGOCALCIFEROL 1.25 MG/1
50000 CAPSULE ORAL WEEKLY
Qty: 12 CAPSULE | Refills: 1 | OUTPATIENT
Start: 2021-08-13 | End: 2022-01-28

## 2021-08-13 RX ORDER — ONDANSETRON 4 MG/1
4 TABLET, ORALLY DISINTEGRATING ORAL EVERY 8 HOURS PRN
Qty: 20 TABLET | Refills: 0 | Status: SHIPPED | OUTPATIENT
Start: 2021-08-13 | End: 2021-09-16 | Stop reason: SDUPTHER

## 2021-08-13 NOTE — TELEPHONE ENCOUNTER
LOV: 06-    LRF: 07/09/2021    Health Maintenance   Topic Date Due    Hepatitis C screen  Never done    Cervical cancer screen  Never done    Flu vaccine (1) 09/01/2021    DTaP/Tdap/Td vaccine (2 - Td or Tdap) 03/06/2029    COVID-19 Vaccine  Completed    HIV screen  Completed    Hepatitis A vaccine  Aged Out    Hepatitis B vaccine  Aged Out    Hib vaccine  Aged Out    Meningococcal (ACWY) vaccine  Aged Out    Pneumococcal 0-64 years Vaccine  Aged Out    Varicella vaccine  Discontinued             (applicable per patient's age: Cancer Screenings, Depression Screening, Fall Risk Screening, Immunizations)    Hemoglobin A1C (%)   Date Value   04/08/2021 5.2   07/29/2020 5.3   12/09/2019 5.4     LDL Cholesterol (mg/dL)   Date Value   07/29/2020 127     LDL Calculated (mg/dL)   Date Value   04/08/2021 172 (H)     AST (U/L)   Date Value   04/08/2021 20     ALT (U/L)   Date Value   04/08/2021 24     BUN (mg/dL)   Date Value   06/19/2021 9      (goal A1C is < 7)   (goal LDL is <100) need 30-50% reduction from baseline     BP Readings from Last 3 Encounters:   07/20/21 106/73   07/01/21 124/86   07/01/21 108/65    (goal /80)      All Future Testing planned in [facility]:  Lab Frequency Next Occurrence   XR KNEE RIGHT (1-2 VIEWS) Once 04/08/2022   Lactate Dehydrogenase Once 04/08/2022   Calprotectin Stool Once 04/08/2022   Clostridium Difficile Toxin/Antigen Once 04/08/2022   Gastrointestinal Panel, Molecular Once 04/08/2022   Fecal lactoferrin Once 04/08/2022   Blood Occult Stool #1 Once 04/08/2022   H.  Pylori Antigen, EIA Once 04/08/2022   Genesight Psychotropic (combinatorial pharmacogenomics test) Once 04/08/2022   EGD Once 05/27/2021   COLONOSCOPY W/ OR W/O BIOPSY Once 05/27/2021   CT CHEST ABDOMEN PELVIS W CONTRAST Once 07/01/2021       Next Visit Date:  Future Appointments   Date Time Provider Mario Bullard   8/17/2021  1:15 PM Mamta Carter, [address] [place] [address]            Patient Active Problem List:     Migraine headache     Vaginal bleeding     Anxiety     Vitamin D insufficiency     PTSD (post-traumatic stress disorder)     Excessive and frequent menstruation     Adult body mass index 40 and over     Attention deficit disorder without hyperactivity     Calculus of gallbladder with chronic cholecystitis without obstruction     Developmental disorder of scholastic skill     Endometriosis     Esophageal reflux     Observation of other suspected mental condition     Pelvic congestion     Platelet dense granule deficiency (HCC)     Platelet function defect ([address]     Morbid obesity ([address]     Recurrent dislocation of patella, right knee

## 2021-08-17 ENCOUNTER — OFFICE VISIT (OUTPATIENT)
Dept: ORTHOPEDIC SURGERY | Age: 26
End: 2021-08-17

## 2021-08-17 VITALS
RESPIRATION RATE: 13 BRPM | HEIGHT: 63 IN | DIASTOLIC BLOOD PRESSURE: 70 MMHG | BODY MASS INDEX: 33.66 KG/M2 | SYSTOLIC BLOOD PRESSURE: 102 MMHG | HEART RATE: 76 BPM | WEIGHT: 190 LBS

## 2021-08-17 DIAGNOSIS — M22.01 RECURRENT DISLOCATION OF PATELLA, RIGHT KNEE: Primary | ICD-10-CM

## 2021-08-17 DIAGNOSIS — M22.02 RECURRENT DISLOCATION OF PATELLA, LEFT: ICD-10-CM

## 2021-08-17 PROCEDURE — 99024 POSTOP FOLLOW-UP VISIT: CPT | Performed by: PHYSICIAN ASSISTANT

## 2021-08-17 ASSESSMENT — ENCOUNTER SYMPTOMS
RESPIRATORY NEGATIVE: 1
NAUSEA: 0
ABDOMINAL PAIN: 0
VOMITING: 0
ABDOMINAL DISTENTION: 0
CONSTIPATION: 0
COUGH: 0
CHEST TIGHTNESS: 0
APNEA: 0
SHORTNESS OF BREATH: 0
DIARRHEA: 0
COLOR CHANGE: 0

## 2021-08-17 NOTE — PROGRESS NOTES
[facility]  [address] [place] [id number]  Dept: [phone]  Dept Fax: [phone]        Post Operative Follow Up    Subjective:     Chief Complaint   Patient presents with    Post-Op Check     R knee scope dos 6/18/21     Post Op Surgery:     The patient is here for a follow up after having a right knee arthroscopy with debridement, lateral release, and tibial tubercle osteotomy. The date of surgery was on 6/18/2021. Therefore we are almost 9 weeks postop. She is not needing anything for pain. She is currently going to physical therapy 3 times a week. Patient states they are doing better. She presents today with no assisting devices and full weightbearing. She also does her exercises on her own. Review of Systems   Constitutional: Positive for activity change. Negative for appetite change, fatigue and fever. Respiratory: Negative. Negative for apnea, cough, chest tightness and shortness of breath. Cardiovascular: Negative. Negative for chest pain, palpitations and leg swelling. Gastrointestinal: Negative for abdominal distention, abdominal pain, constipation, diarrhea, nausea and vomiting. Genitourinary: Negative for difficulty urinating, dysuria and hematuria. Musculoskeletal: Positive for arthralgias and gait problem. Negative for joint swelling and myalgias. Skin: Negative for color change and rash. Neurological: Negative for dizziness, weakness, numbness and headaches. Psychiatric/Behavioral: Negative for sleep disturbance. I have reviewed the CC, HPI, ROS, PMH, FHX, Social History, and if not present in this note, I have reviewed in the patient's chart. I agree with the documentation provided by other staff and have reviewed their documentation prior to providing my signature indicating agreement.   Vitals:   /70   Pulse 76   Resp 13   Ht 5' 3\" (1.6 m)   Wt 190 lb (86.2 kg)   BMI 33.66 kg/m²  Body mass index is 33.66 kg/m². Physical Examination:     Orthopedics:    GENERAL: Alert and oriented X3 in no acute distress. SKIN: Intact without lesions or ulcerations. Incision is well healed with no signs of infection. NEURO: Intact to sensory and motor testing. VASC: Capillary refill is less than 3 seconds. Post Op Exam:    LOCATION: Right knee  SITE: Distal neurocirculatory status is intact. EXAM: Sensation is intact to light touch, there is full motor function of the extremity. ROM: 0/125 degrees. Improving quad tone    Radiology:   KNEE X-RAY    4 views of the right knee including AP, bilateral tunnel, and lateral in the upright position, and skyline views reveal anatomic alignment with no fracture or dislocation. Kellgren grade II changes of osteoarthritis (joint space narrowing, osteophyte, subchondral sclerosis, bony deformity/cyst) of the tricompartment(s). No osseous loose bodies. No bony erosion or periosteal reaction. No soft tissue masses. Stop surgical changes of a tibial osteotomy with bone graft in acceptable position with progressive healing. Impression: Stable postop surgical changes of the right knee. Assessment:     1. Recurrent dislocation of patella, right knee    2. Recurrent dislocation of patella, left      Plan:   Post Op Treatment : Patient had the treatment regimen reviewed today. I reviewed the films with the patient. During today's visit, discussed that the patient is doing really well. I am going to get some x-rays of her right knee today. The patient then stated that they understand the plan and at this time, the patient has opted continuing physical therapy with no restrictions. She is ready to return to work. She also has the recurrent dislocation/subluxation of the left knee.   She had talked with Dr. Eliana Pirce regarding a J brace for the left knee and since working to be holding off on surgery for that would like to get a patellar stabilizing brace for the left knee. Patient should return to the office in 4 weeks without x-ray to f/u with me. The patient will call the office immediately with any problems that may arise. No orders of the defined types were placed in this encounter.       Orders Placed This Encounter   Procedures    XR KNEE RIGHT (MIN 4 VIEWS)     Standing Status:   Future     Standing Expiration Date:   8/17/2022    External Referral To Physical Therapy     Referral Priority:   Routine     Referral Type:   Eval and Treat     Referral Reason:   Specialty Services Required     Requested Specialty:   Physical Therapy     Number of Visits Requested:   1         Electronically signed by Cecelia Addison PA-C, on 8/17/2021 at 1:43 PM

## 2021-09-16 DIAGNOSIS — K92.0 COFFEE GROUND EMESIS: ICD-10-CM

## 2021-09-16 DIAGNOSIS — G43.009 MIGRAINE WITHOUT AURA AND WITHOUT STATUS MIGRAINOSUS, NOT INTRACTABLE: ICD-10-CM

## 2021-09-17 NOTE — TELEPHONE ENCOUNTER
Care Management Discharge Note    Discharge Date: 05/11/21       Discharge Disposition: Home, Home Infusion    Discharge Services: None    Discharge DME: None    Discharge Transportation: family or friend will provide    Patient/family educated on Medicare website which has current facility and service quality ratings: no    Education Provided on the Discharge Plan:  Yes  Persons Notified of Discharge Plans: Patient  Patient/Family in Agreement with the Plan: yes    Handoff Referral Completed: Yes    Additional Information:  Pt status reviewed/discussed during care team rounds.  Pt participated in PLC yesterday.   Anticipate discharge home today.  Pt will discharge on q day IV Ceftriaxone.     Met with pt.  Reviewed anticipated plan for discharge.  Patient confirmed that all home infusion supplies were delivered to her home over the weekend.  Pt noted possibly wanting to be assessed for a paracentesis prior to discharge home today.  Per pt she is scheduled for an outpatient paracentesis on Friday but she is feeling a little full and is concerned about waiting until Friday.  Pt notes no concerns regarding anticipated plan for discharge.    Reviewed discharge home infusion orders.    Paged provider inquiring about pending discharge today.  Reviewed above with Gemma WRIGHT.   Updated [facility] Liaison,.       Brissa Ruelas RN BSN, PHN, ACM-RN  7A RN Care Coordinator  Phone: [phone]  Pager [phone]    5/11/2021 10:43 AM         LOV 6/22/21  RTO As needed  [facility] 8/13/21    Health Maintenance   Topic Date Due    Hepatitis C screen  Never done    Pap smear  Never done    Flu vaccine (1) 09/01/2021    DTaP/Tdap/Td vaccine (2 - Td or Tdap) 03/06/2029    COVID-19 Vaccine  Completed    HIV screen  Completed    Hepatitis A vaccine  Aged Out    Hepatitis B vaccine  Aged Out    Hib vaccine  Aged Out    Meningococcal (ACWY) vaccine  Aged Out    Pneumococcal 0-64 years Vaccine  Aged Out    Varicella vaccine  Discontinued             (applicable per patient's age: Cancer Screenings, Depression Screening, Fall Risk Screening, Immunizations)    Hemoglobin A1C (%)   Date Value   04/08/2021 5.2   07/29/2020 5.3   12/09/2019 5.4     LDL Cholesterol (mg/dL)   Date Value   07/29/2020 127     LDL Calculated (mg/dL)   Date Value   04/08/2021 172 (H)     AST (U/L)   Date Value   04/08/2021 20     ALT (U/L)   Date Value   04/08/2021 24     BUN (mg/dL)   Date Value   06/19/2021 9      (goal A1C is < 7)   (goal LDL is <100) need 30-50% reduction from baseline     BP Readings from Last 3 Encounters:   08/17/21 102/70   07/20/21 106/73   07/01/21 124/86    (goal /80)      All Future Testing planned in [facility]:  Lab Frequency Next Occurrence   XR KNEE RIGHT (1-2 VIEWS) Once 04/08/2022   Lactate Dehydrogenase Once 04/08/2022   Calprotectin Stool Once 04/08/2022   Clostridium Difficile Toxin/Antigen Once 04/08/2022   Gastrointestinal Panel, Molecular Once 04/08/2022   Fecal lactoferrin Once 04/08/2022   Blood Occult Stool #1 Once 04/08/2022   H.  Pylori Antigen, EIA Once 04/08/2022   Genesight Psychotropic (combinatorial pharmacogenomics test) Once 04/08/2022   CT CHEST ABDOMEN PELVIS W CONTRAST Once 07/01/2021       Next Visit Date:  Future Appointments   Date Time Provider Mario Bullard   9/21/2021  1:15 PM Gus Spivey, [address] [facility]            Patient Active Problem List:     Migraine headache     Vaginal bleeding     Anxiety Vitamin D insufficiency     PTSD (post-traumatic stress disorder)     Excessive and frequent menstruation     Adult body mass index 40 and over     Attention deficit disorder without hyperactivity     Calculus of gallbladder with chronic cholecystitis without obstruction     Developmental disorder of scholastic skill     Endometriosis     Esophageal reflux     Observation of other suspected mental condition     Pelvic congestion     Platelet dense granule deficiency (HCC)     Platelet function defect ([address]     Morbid obesity ([address]     Recurrent dislocation of patella, right knee

## 2021-09-18 RX ORDER — ONDANSETRON 4 MG/1
4 TABLET, ORALLY DISINTEGRATING ORAL EVERY 8 HOURS PRN
Qty: 20 TABLET | Refills: 0 | Status: SHIPPED | OUTPATIENT
Start: 2021-09-18 | End: 2021-11-08 | Stop reason: SDUPTHER

## 2021-09-18 RX ORDER — BUTALBITAL, ACETAMINOPHEN AND CAFFEINE 50; 325; 40 MG/1; MG/1; MG/1
1 TABLET ORAL EVERY 4 HOURS PRN
Qty: 20 TABLET | Refills: 0 | Status: SHIPPED | OUTPATIENT
Start: 2021-09-18 | End: 2021-11-08 | Stop reason: SDUPTHER

## 2021-10-20 ENCOUNTER — OFFICE VISIT (OUTPATIENT)
Dept: ORTHOPEDIC SURGERY | Age: 26
End: 2021-10-20
Payer: MEDICARE

## 2021-10-20 VITALS
RESPIRATION RATE: 14 BRPM | HEART RATE: 81 BPM | WEIGHT: 211 LBS | BODY MASS INDEX: 37.39 KG/M2 | HEIGHT: 63 IN | SYSTOLIC BLOOD PRESSURE: 98 MMHG | DIASTOLIC BLOOD PRESSURE: 74 MMHG

## 2021-10-20 DIAGNOSIS — M22.01 RECURRENT DISLOCATION OF PATELLA, RIGHT KNEE: Primary | ICD-10-CM

## 2021-10-20 DIAGNOSIS — Z98.890 S/P RIGHT KNEE SURGERY: ICD-10-CM

## 2021-10-20 PROCEDURE — 99213 OFFICE O/P EST LOW 20 MIN: CPT | Performed by: PHYSICIAN ASSISTANT

## 2021-10-20 PROCEDURE — G8484 FLU IMMUNIZE NO ADMIN: HCPCS | Performed by: PHYSICIAN ASSISTANT

## 2021-10-20 PROCEDURE — G8427 DOCREV CUR MEDS BY ELIG CLIN: HCPCS | Performed by: PHYSICIAN ASSISTANT

## 2021-10-20 PROCEDURE — G8417 CALC BMI ABV UP PARAM F/U: HCPCS | Performed by: PHYSICIAN ASSISTANT

## 2021-10-20 PROCEDURE — 1036F TOBACCO NON-USER: CPT | Performed by: PHYSICIAN ASSISTANT

## 2021-10-20 RX ORDER — NAPROXEN 500 MG/1
500 TABLET ORAL 2 TIMES DAILY WITH MEALS
Qty: 60 TABLET | Refills: 0 | Status: SHIPPED | OUTPATIENT
Start: 2021-10-20 | End: 2022-09-21

## 2021-10-20 ASSESSMENT — ENCOUNTER SYMPTOMS
CHEST TIGHTNESS: 0
APNEA: 0
COLOR CHANGE: 0
SHORTNESS OF BREATH: 0
VOMITING: 0
COUGH: 0
ABDOMINAL PAIN: 0
CONSTIPATION: 0
NAUSEA: 0
RESPIRATORY NEGATIVE: 1
ABDOMINAL DISTENTION: 0
DIARRHEA: 0

## 2021-10-20 NOTE — PROGRESS NOTES
[facility]  [address]  [facility] [address]  Dept: [phone]  Dept Fax: [phone]        Post Operative Follow Up    Subjective:     Chief Complaint   Patient presents with    Follow-up     R knee, 4M s/p scope dos 6/18/21     Post Op Surgery:     The patient is here for a follow up after having a right knee arthroscopy with debridement, lateral release, and tibial tubercle osteotomy. The date of surgery was on 6/18/2021. Therefore we are almost 4 months postop. She is not needing anything for pain. She finished physical therapy. She states she has not been able to find a job yet but is looking. Review of Systems   Constitutional: Positive for activity change. Negative for appetite change, fatigue and fever. Respiratory: Negative. Negative for apnea, cough, chest tightness and shortness of breath. Cardiovascular: Negative. Negative for chest pain, palpitations and leg swelling. Gastrointestinal: Negative for abdominal distention, abdominal pain, constipation, diarrhea, nausea and vomiting. Genitourinary: Negative for difficulty urinating, dysuria and hematuria. Musculoskeletal: Positive for arthralgias. Negative for gait problem, joint swelling and myalgias. Skin: Negative for color change and rash. Neurological: Negative for dizziness, weakness, numbness and headaches. Psychiatric/Behavioral: Negative for sleep disturbance. I have reviewed the CC, HPI, ROS, PMH, FHX, Social History, and if not present in this note, I have reviewed in the patient's chart. I agree with the documentation provided by other staff and have reviewed their documentation prior to providing my signature indicating agreement. Vitals:   BP 98/74   Pulse 81   Resp 14   Ht 5' 3\" (1.6 m)   Wt 211 lb (95.7 kg)   BMI 37.38 kg/m²  Body mass index is 37.38 kg/m².   Physical Examination:     Orthopedics:    GENERAL: Alert and oriented X3 in no acute distress. SKIN: Intact without lesions or ulcerations. Incision is well healed with no signs of infection. NEURO: Intact to sensory and motor testing. VASC: Capillary refill is less than 3 seconds. Post Op Exam:    LOCATION: Right knee  SITE: Distal neurocirculatory status is intact. EXAM: Sensation is intact to light touch, there is full motor function of the extremity. ROM: 0/134 degrees. No extension lag. Good quad tone. No patellar hypermobility or apprehension. Radiology:   No results found. Assessment:     1. Recurrent dislocation of patella, right knee    2. S/P right knee surgery      Plan:   Post Op Treatment : Patient had the treatment regimen reviewed today 4 months status post right knee arthroscopy with debridement, lateral release and tibial tubercle osteotomy. I reviewed the films with the patient. During today's visit, discussed that she is doing extremely well. She really has no restrictions at this time. Her x-rays look great at her last appointment and look completely healed. The patient then stated that they understand the plan and at this time, the patient has opted following up as needed. She knows eventually she will need her left knee done but at this time she is not ready to have that big of a surgery. She is ready to proceed with the surgery we can get her in with Dr. Roxy Ribeiro to discuss having the same surgery done on the other knee. She did ask if she could have some naproxen for when her knee is sore. I told her I will give her 1 prescription of naproxen and then she can either purchase it over-the-counter or speak to her family doctor about refills. Patient should return to the office as needed to f/u with me. The patient will call the office immediately with any problems that may arise.      Orders Placed This Encounter   Medications    naproxen (NAPROSYN) 500 MG tablet     Sig: Take 1 tablet by mouth 2 times daily (with meals)

## 2021-11-08 DIAGNOSIS — K92.0 COFFEE GROUND EMESIS: ICD-10-CM

## 2021-11-08 DIAGNOSIS — G43.009 MIGRAINE WITHOUT AURA AND WITHOUT STATUS MIGRAINOSUS, NOT INTRACTABLE: ICD-10-CM

## 2021-11-08 NOTE — TELEPHONE ENCOUNTER
Last visit:6/22/21  Last Med refill: 9/18/21  Does patient have enough medication for 72 hours: No:     Next Visit Date:  No future appointments. Health Maintenance   Topic Date Due    Hepatitis C screen  Never done    Pap smear  Never done    Flu vaccine (1) 09/01/2021    DTaP/Tdap/Td vaccine (2 - Td or Tdap) 03/06/2029    COVID-19 Vaccine  Completed    HIV screen  Completed    Hepatitis A vaccine  Aged Out    Hepatitis B vaccine  Aged Out    Hib vaccine  Aged Out    Meningococcal (ACWY) vaccine  Aged Out    Pneumococcal 0-64 years Vaccine  Aged Out    Varicella vaccine  Discontinued       Hemoglobin A1C (%)   Date Value   04/08/2021 5.2   07/29/2020 5.3   12/09/2019 5.4             ( goal A1C is < 7)   No results found for: LABMICR  LDL Cholesterol (mg/dL)   Date Value   07/29/2020 127   12/09/2019 129     LDL Calculated (mg/dL)   Date Value   04/08/2021 172 (H)       (goal LDL is <100)   AST (U/L)   Date Value   04/08/2021 20     ALT (U/L)   Date Value   04/08/2021 24     BUN (mg/dL)   Date Value   06/19/2021 9     BP Readings from Last 3 Encounters:   10/20/21 98/74   08/17/21 102/70   07/20/21 106/73          (goal 120/80)    All Future Testing planned in [facility]  Lab Frequency Next Occurrence   XR KNEE RIGHT (1-2 VIEWS) Once 04/08/2022   Lactate Dehydrogenase Once 04/08/2022   Calprotectin Stool Once 04/08/2022   Clostridium Difficile Toxin/Antigen Once 04/08/2022   Gastrointestinal Panel, Molecular Once 04/08/2022   Fecal lactoferrin Once 04/08/2022   Blood Occult Stool #1 Once 04/08/2022   H.  Pylori Antigen, EIA Once 04/08/2022   Genesight Psychotropic (combinatorial pharmacogenomics test) Once 04/08/2022   CT CHEST ABDOMEN PELVIS W CONTRAST Once 07/01/2021               Patient Active Problem List:     Migraine headache     Vaginal bleeding     Anxiety     Vitamin D insufficiency     PTSD (post-traumatic stress disorder)     Excessive and frequent menstruation     Adult body mass index 40 and over     Attention deficit disorder without hyperactivity     Calculus of gallbladder with chronic cholecystitis without obstruction     Developmental disorder of scholastic skill     Endometriosis     Esophageal reflux     Observation of other suspected mental condition     Pelvic congestion     Platelet dense granule deficiency (HCC)     Platelet function defect ([address]     Morbid obesity ([address]     Recurrent dislocation of patella, right knee

## 2021-11-09 RX ORDER — BUTALBITAL, ACETAMINOPHEN AND CAFFEINE 50; 325; 40 MG/1; MG/1; MG/1
1 TABLET ORAL EVERY 4 HOURS PRN
Qty: 20 TABLET | Refills: 0 | Status: SHIPPED | OUTPATIENT
Start: 2021-11-09 | End: 2021-11-27 | Stop reason: SDUPTHER

## 2021-11-09 RX ORDER — ONDANSETRON 4 MG/1
4 TABLET, ORALLY DISINTEGRATING ORAL EVERY 8 HOURS PRN
Qty: 20 TABLET | Refills: 0 | Status: SHIPPED | OUTPATIENT
Start: 2021-11-09 | End: 2021-11-27 | Stop reason: SDUPTHER

## 2021-11-27 DIAGNOSIS — G43.009 MIGRAINE WITHOUT AURA AND WITHOUT STATUS MIGRAINOSUS, NOT INTRACTABLE: ICD-10-CM

## 2021-11-27 DIAGNOSIS — K92.0 COFFEE GROUND EMESIS: ICD-10-CM

## 2021-11-27 DIAGNOSIS — E55.9 VITAMIN D INSUFFICIENCY: ICD-10-CM

## 2021-11-27 RX ORDER — ERGOCALCIFEROL 1.25 MG/1
50000 CAPSULE ORAL WEEKLY
Qty: 12 CAPSULE | Refills: 1 | Status: CANCELLED | OUTPATIENT
Start: 2021-11-27 | End: 2022-05-14

## 2021-11-29 ENCOUNTER — OFFICE VISIT (OUTPATIENT)
Dept: FAMILY MEDICINE CLINIC | Age: 26
End: 2021-11-29
Payer: MEDICARE

## 2021-11-29 VITALS
DIASTOLIC BLOOD PRESSURE: 80 MMHG | OXYGEN SATURATION: 97 % | HEART RATE: 79 BPM | BODY MASS INDEX: 36.21 KG/M2 | RESPIRATION RATE: 20 BRPM | WEIGHT: 204.4 LBS | SYSTOLIC BLOOD PRESSURE: 110 MMHG

## 2021-11-29 DIAGNOSIS — F43.10 PTSD (POST-TRAUMATIC STRESS DISORDER): ICD-10-CM

## 2021-11-29 DIAGNOSIS — R05.9 COUGH: ICD-10-CM

## 2021-11-29 DIAGNOSIS — E55.9 VITAMIN D INSUFFICIENCY: ICD-10-CM

## 2021-11-29 DIAGNOSIS — F98.8 ATTENTION DEFICIT DISORDER WITHOUT HYPERACTIVITY: ICD-10-CM

## 2021-11-29 DIAGNOSIS — F41.9 ANXIETY: ICD-10-CM

## 2021-11-29 DIAGNOSIS — J18.9 PNEUMONIA OF RIGHT UPPER LOBE DUE TO INFECTIOUS ORGANISM: Primary | ICD-10-CM

## 2021-11-29 DIAGNOSIS — F33.2 SEVERE EPISODE OF RECURRENT MAJOR DEPRESSIVE DISORDER, WITHOUT PSYCHOTIC FEATURES (HCC): ICD-10-CM

## 2021-11-29 PROBLEM — F17.200 TOBACCO USE DISORDER: Status: ACTIVE | Noted: 2021-11-29

## 2021-11-29 PROBLEM — R11.15 CYCLICAL VOMITING: Status: ACTIVE | Noted: 2021-11-29

## 2021-11-29 PROBLEM — F32.A DEPRESSIVE DISORDER: Status: ACTIVE | Noted: 2021-11-29

## 2021-11-29 PROCEDURE — G8417 CALC BMI ABV UP PARAM F/U: HCPCS | Performed by: NURSE PRACTITIONER

## 2021-11-29 PROCEDURE — 99214 OFFICE O/P EST MOD 30 MIN: CPT | Performed by: NURSE PRACTITIONER

## 2021-11-29 PROCEDURE — 4004F PT TOBACCO SCREEN RCVD TLK: CPT | Performed by: NURSE PRACTITIONER

## 2021-11-29 PROCEDURE — G8427 DOCREV CUR MEDS BY ELIG CLIN: HCPCS | Performed by: NURSE PRACTITIONER

## 2021-11-29 PROCEDURE — G8484 FLU IMMUNIZE NO ADMIN: HCPCS | Performed by: NURSE PRACTITIONER

## 2021-11-29 RX ORDER — ERGOCALCIFEROL 1.25 MG/1
50000 CAPSULE ORAL WEEKLY
Qty: 12 CAPSULE | Refills: 1 | Status: SHIPPED | OUTPATIENT
Start: 2021-11-29 | End: 2022-06-09 | Stop reason: SDUPTHER

## 2021-11-29 RX ORDER — BENZONATATE 100 MG/1
100-200 CAPSULE ORAL 3 TIMES DAILY PRN
Qty: 60 CAPSULE | Refills: 0 | Status: SHIPPED | OUTPATIENT
Start: 2021-11-29 | End: 2021-12-06

## 2021-11-29 RX ORDER — NAPROXEN 500 MG/1
500 TABLET ORAL 2 TIMES DAILY WITH MEALS
Qty: 60 TABLET | Refills: 0 | Status: CANCELLED | OUTPATIENT
Start: 2021-11-29

## 2021-11-29 RX ORDER — DOXYCYCLINE HYCLATE 100 MG
100 TABLET ORAL 2 TIMES DAILY
Qty: 20 TABLET | Refills: 0 | Status: SHIPPED | OUTPATIENT
Start: 2021-11-29 | End: 2021-12-09

## 2021-11-29 ASSESSMENT — ENCOUNTER SYMPTOMS
ABDOMINAL PAIN: 0
CHEST TIGHTNESS: 1
WHEEZING: 0
ABDOMINAL DISTENTION: 0
BLOOD IN STOOL: 0
SORE THROAT: 0
COUGH: 1
TROUBLE SWALLOWING: 0
CONSTIPATION: 0
SHORTNESS OF BREATH: 0
BACK PAIN: 0
NAUSEA: 0
SINUS PRESSURE: 1
RHINORRHEA: 0
DIARRHEA: 0

## 2021-11-29 ASSESSMENT — PATIENT HEALTH QUESTIONNAIRE - PHQ9
10. IF YOU CHECKED OFF ANY PROBLEMS, HOW DIFFICULT HAVE THESE PROBLEMS MADE IT FOR YOU TO DO YOUR WORK, TAKE CARE OF THINGS AT HOME, OR GET ALONG WITH OTHER PEOPLE: 3
2. FEELING DOWN, DEPRESSED OR HOPELESS: 3
9. THOUGHTS THAT YOU WOULD BE BETTER OFF DEAD, OR OF HURTING YOURSELF: 0
SUM OF ALL RESPONSES TO PHQ QUESTIONS 1-9: 16
1. LITTLE INTEREST OR PLEASURE IN DOING THINGS: 3
SUM OF ALL RESPONSES TO PHQ QUESTIONS 1-9: 16
6. FEELING BAD ABOUT YOURSELF - OR THAT YOU ARE A FAILURE OR HAVE LET YOURSELF OR YOUR FAMILY DOWN: 1
7. TROUBLE CONCENTRATING ON THINGS, SUCH AS READING THE NEWSPAPER OR WATCHING TELEVISION: 3
SUM OF ALL RESPONSES TO PHQ9 QUESTIONS 1 & 2: 6
3. TROUBLE FALLING OR STAYING ASLEEP: 3
8. MOVING OR SPEAKING SO SLOWLY THAT OTHER PEOPLE COULD HAVE NOTICED. OR THE OPPOSITE, BEING SO FIGETY OR RESTLESS THAT YOU HAVE BEEN MOVING AROUND A LOT MORE THAN USUAL: 0
SUM OF ALL RESPONSES TO PHQ QUESTIONS 1-9: 16
5. POOR APPETITE OR OVEREATING: 0
4. FEELING TIRED OR HAVING LITTLE ENERGY: 3

## 2021-11-29 ASSESSMENT — COLUMBIA-SUICIDE SEVERITY RATING SCALE - C-SSRS
2. HAVE YOU ACTUALLY HAD ANY THOUGHTS OF KILLING YOURSELF?: NO
6. HAVE YOU EVER DONE ANYTHING, STARTED TO DO ANYTHING, OR PREPARED TO DO ANYTHING TO END YOUR LIFE?: NO
1. WITHIN THE PAST MONTH, HAVE YOU WISHED YOU WERE DEAD OR WISHED YOU COULD GO TO SLEEP AND NOT WAKE UP?: NO

## 2021-11-29 ASSESSMENT — VISUAL ACUITY: OU: 1

## 2021-11-29 NOTE — PROGRESS NOTES
[address]  [phone]    11/29/21     Patient ID  Elbert Egan is a 27 y.o. female  Established patient    Chief Complaint  Elbert Egan presents today for Hearing Problem and Ear Fullness (Coughing up mucus, left ear. Onset 2 days. Dark green mucus. Thick and chunky.)    Have you seen any other physician or provider since your last visit? Yes - Records Obtained Ángel- Dr. Meghana Doe- Dr. Naila Veliz   Have you had any other diagnostic tests since your last visit? Yes - Records Obtained EGD   Have you been seen in the emergency room and/or had an admission to a hospital since we last saw you? No    ASSESSMENT/PLAN  1. Pneumonia of right upper lobe due to infectious organism  -     benzonatate (TESSALON) 100 MG capsule; Take 1-2 capsules by mouth 3 times daily as needed for Cough, Disp-60 capsule, R-0Normal  -     doxycycline hyclate (VIBRA-TABS) 100 MG tablet; Take 1 tablet by mouth 2 times daily for 10 days, Disp-20 tablet, R-0Normal  2. Cough  3. Severe episode of recurrent major depressive disorder, without psychotic features ([facility] [address]  -     Genesight Psychotropic (combinatorial pharmacogenomics test); Future  4. Anxiety  -     Genesight Psychotropic (combinatorial pharmacogenomics test); Future  5. PTSD (post-traumatic stress disorder)  -     Genesight Psychotropic (combinatorial pharmacogenomics test); Future  6. Attention deficit disorder without hyperactivity  -     Genesight Psychotropic (combinatorial pharmacogenomics test); Future  7. Vitamin D insufficiency  -     vitamin D (ERGOCALCIFEROL) 1.25 MG (22658 UT) CAPS capsule; Take 1 capsule by mouth once a week, Disp-12 capsule, R-1Normal     Continue to follow with psychiatry     Return for As needed, Call if systems do not improve or get worse.     Patient Care Team:  GIO Muñiz CNP as PCP - General (Nurse Practitioner Family)  GIO Muñiz CNP as PCP - [facility] Empaneled Provider  Sisi Paniagua (Obstetrics & Gynecology)  Nida Leslie MD as Consulting Physician (Gastroenterology)  Devon Parikh DO as Consulting Physician (Orthopedic Surgery)    SUBJECTIVE/OBJECTIVE  History of Present illness / Visit Summary     Depression  Patient complains of depression. Complains of depressed mood, difficulty concentrating, fatigue, impaired memory, insomnia and psychomotor agitation. Denies current suicidal and homicidal plan or intent. Current treatment includes Celexa, Latuda, Adderall, clonidine, risperidal and individual therapy. Complains of the following side effects from the treatment: multiple. Onset was approximately several years ago, gradually worsening since that time. Diagnosed ADHD, PTSD, anxiety at young age. Not currently taking any medications. Discussed GeneSight testing in past.      Unable to view chart due to no Internet connection. Review of Systems  Review of Systems   Constitutional: Positive for chills and fatigue. Negative for activity change, appetite change and fever. HENT: Positive for congestion, ear pain (left ear, fullness) and sinus pressure. Negative for postnasal drip, rhinorrhea, sneezing, sore throat and trouble swallowing. Respiratory: Positive for cough (worse HS) and chest tightness. Negative for shortness of breath and wheezing. Cardiovascular: Negative for chest pain, palpitations and leg swelling. Gastrointestinal: Negative for abdominal distention, abdominal pain, blood in stool, constipation, diarrhea and nausea. No GI sxs - no longer following with GI?  EGD completed. Cyclic vomiting was due to excessive THC    Genitourinary: Negative for difficulty urinating, dysuria, frequency, hematuria and urgency. Musculoskeletal: Positive for myalgias. Negative for arthralgias (generalized), back pain, gait problem and joint swelling. Recent right knee intervention.  Follows with orthopedic    Skin: shiner. Conjunctiva/sclera: Conjunctivae normal.   Cardiovascular:      Rate and Rhythm: Normal rate and regular rhythm. No extrasystoles are present. Pulses: Normal pulses. Radial pulses are 2+ on the right side and 2+ on the left side. Dorsalis pedis pulses are 2+ on the right side and 2+ on the left side. Heart sounds: Normal heart sounds, S1 normal and S2 normal. No murmur heard. Pulmonary:      Effort: Pulmonary effort is normal. No accessory muscle usage, prolonged expiration or respiratory distress. Breath sounds: Decreased air movement present. Examination of the right-upper field reveals rhonchi. Rhonchi present. No wheezing. Musculoskeletal:      Cervical back: Normal range of motion. No torticollis. No pain with movement. Right lower leg: No edema. Left lower leg: No edema. Lymphadenopathy:      Cervical: Cervical adenopathy present. Right cervical: Superficial cervical adenopathy present. Left cervical: Superficial cervical adenopathy present. Skin:     General: Skin is warm and dry. Coloration: Skin is not ashen, cyanotic, jaundiced or pale. Neurological:      General: No focal deficit present. Mental Status: She is alert and oriented to person, place, and time. Motor: Motor function is intact. Gait: Gait is intact. Psychiatric:         Attention and Perception: Attention and perception normal.         Mood and Affect: Mood is depressed. Affect is flat. Speech: Speech normal.         Behavior: Behavior normal. Behavior is cooperative. Thought Content: Thought content normal. Thought content does not include homicidal or suicidal ideation. Thought content does not include homicidal or suicidal plan.          Cognition and Memory: Cognition and memory normal.         Judgment: Judgment normal.           Electronically signed by GIO Luis CNP, APRN-CNP on 12/6/2021 at 9:06 PM    Please note that this chart was generated using voice recognition Dragon dictation software. Although every effort was made to ensure the accuracy of this automated transcription, some errors in transcription may have occurred.

## 2021-11-30 RX ORDER — ONDANSETRON 4 MG/1
4 TABLET, ORALLY DISINTEGRATING ORAL EVERY 8 HOURS PRN
Qty: 20 TABLET | Refills: 0 | Status: SHIPPED | OUTPATIENT
Start: 2021-11-30 | End: 2021-12-24 | Stop reason: SDUPTHER

## 2021-11-30 RX ORDER — BUTALBITAL, ACETAMINOPHEN AND CAFFEINE 50; 325; 40 MG/1; MG/1; MG/1
1 TABLET ORAL EVERY 4 HOURS PRN
Qty: 20 TABLET | Refills: 0 | Status: SHIPPED | OUTPATIENT
Start: 2021-11-30 | End: 2021-12-24 | Stop reason: SDUPTHER

## 2021-11-30 NOTE — TELEPHONE ENCOUNTER
menstruation     Adult body mass index 40 and over     Attention deficit disorder without hyperactivity     Calculus of gallbladder with chronic cholecystitis without obstruction     Developmental disorder of scholastic skill     Endometriosis     Esophageal reflux     Observation of other suspected mental condition     Pelvic congestion     Platelet dense granule deficiency (HCC)     Platelet function defect ([address]     Morbid obesity ([address]     Recurrent dislocation of patella, right knee     Tobacco use disorder     Cyclical vomiting     Depressive disorder

## 2021-12-13 ENCOUNTER — PATIENT MESSAGE (OUTPATIENT)
Dept: FAMILY MEDICINE CLINIC | Age: 26
End: 2021-12-13

## 2021-12-13 DIAGNOSIS — R05.9 COUGH: Primary | ICD-10-CM

## 2021-12-13 NOTE — TELEPHONE ENCOUNTER
From: Shabnam Sims  To: Karolina Carrillo  Sent: 12/13/2021 1:05 PM EST  Subject: Good morning    The mucus is still pooling even after the doxycycline, is there a way to have something sent over to [facility] or do I need to come in first? I've tried OTC generic DayQuil and NyQuil capsules with short relief. And steamy showers with maybe 30 mins of relief as well.

## 2021-12-24 DIAGNOSIS — K92.0 COFFEE GROUND EMESIS: ICD-10-CM

## 2021-12-24 DIAGNOSIS — G43.009 MIGRAINE WITHOUT AURA AND WITHOUT STATUS MIGRAINOSUS, NOT INTRACTABLE: ICD-10-CM

## 2021-12-24 DIAGNOSIS — E55.9 VITAMIN D INSUFFICIENCY: ICD-10-CM

## 2021-12-24 RX ORDER — ERGOCALCIFEROL 1.25 MG/1
50000 CAPSULE ORAL WEEKLY
Qty: 12 CAPSULE | Refills: 1 | Status: CANCELLED | OUTPATIENT
Start: 2021-12-24 | End: 2022-06-10

## 2021-12-26 DIAGNOSIS — G43.009 MIGRAINE WITHOUT AURA AND WITHOUT STATUS MIGRAINOSUS, NOT INTRACTABLE: ICD-10-CM

## 2021-12-26 DIAGNOSIS — K92.0 COFFEE GROUND EMESIS: ICD-10-CM

## 2021-12-27 RX ORDER — ONDANSETRON 4 MG/1
4 TABLET, ORALLY DISINTEGRATING ORAL EVERY 8 HOURS PRN
Qty: 20 TABLET | Refills: 0 | Status: SHIPPED | OUTPATIENT
Start: 2021-12-27 | End: 2021-12-29 | Stop reason: SDUPTHER

## 2021-12-27 RX ORDER — BUTALBITAL, ACETAMINOPHEN AND CAFFEINE 50; 325; 40 MG/1; MG/1; MG/1
1 TABLET ORAL EVERY 4 HOURS PRN
Qty: 20 TABLET | Refills: 0 | Status: SHIPPED | OUTPATIENT
Start: 2021-12-27 | End: 2021-12-29 | Stop reason: SDUPTHER

## 2021-12-27 NOTE — TELEPHONE ENCOUNTER
LOV 11/29/21  RTO As needed  [facility] 11/30/21    Health Maintenance   Topic Date Due    Hepatitis C screen  Never done    Pap smear  Never done    COVID-19 Vaccine (2 - Booster for Recordant series) 07/15/2021    Flu vaccine (1) 09/01/2021    DTaP/Tdap/Td vaccine (2 - Td or Tdap) 03/06/2029    Pneumococcal 0-64 years Vaccine (2 of 2 - PPSV23) 09/20/2058    HIV screen  Completed    Hepatitis A vaccine  Aged Out    Hepatitis B vaccine  Aged Out    Hib vaccine  Aged Out    Meningococcal (ACWY) vaccine  Aged Out    Varicella vaccine  Discontinued             (applicable per patient's age: Cancer Screenings, Depression Screening, Fall Risk Screening, Immunizations)    Hemoglobin A1C (%)   Date Value   04/08/2021 5.2   07/29/2020 5.3   12/09/2019 5.4     LDL Cholesterol (mg/dL)   Date Value   07/29/2020 127     LDL Calculated (mg/dL)   Date Value   04/08/2021 172 (H)     AST (U/L)   Date Value   04/08/2021 20     ALT (U/L)   Date Value   04/08/2021 24     BUN (mg/dL)   Date Value   06/19/2021 9      (goal A1C is < 7)   (goal LDL is <100) need 30-50% reduction from baseline     BP Readings from Last 3 Encounters:   11/29/21 110/80   10/20/21 98/74   08/17/21 102/70    (goal /80)      All Future Testing planned in [facility]:  Lab Frequency Next Occurrence   XR KNEE RIGHT (1-2 VIEWS) Once 04/08/2022   Lactate Dehydrogenase Once 04/08/2022   Calprotectin Stool Once 04/08/2022   Clostridium Difficile Toxin/Antigen Once 04/08/2022   Gastrointestinal Panel, Molecular Once 04/08/2022   Fecal lactoferrin Once 04/08/2022   Blood Occult Stool #1 Once 04/08/2022   H. Pylori Antigen, EIA Once 04/08/2022   CT CHEST ABDOMEN PELVIS W CONTRAST Once 07/01/2021   Genesight Psychotropic (combinatorial pharmacogenomics test) Once 11/29/2021   XR CHEST STANDARD (2 VW) Once 12/15/2021       Next Visit Date:  No future appointments.          Patient Active Problem List:     Migraine headache     Vaginal bleeding     Anxiety     Vitamin D insufficiency     PTSD (post-traumatic stress disorder)     Excessive and frequent menstruation     Adult body mass index 40 and over     Attention deficit disorder without hyperactivity     Calculus of gallbladder with chronic cholecystitis without obstruction     Developmental disorder of scholastic skill     Endometriosis     Esophageal reflux     Observation of other suspected mental condition     Pelvic congestion     Platelet dense granule deficiency (HCC)     Platelet function defect ([address]     Morbid obesity ([address]     Recurrent dislocation of patella, right knee     Tobacco use disorder     Cyclical vomiting     Depressive disorder

## 2021-12-29 RX ORDER — ONDANSETRON 4 MG/1
4 TABLET, ORALLY DISINTEGRATING ORAL EVERY 8 HOURS PRN
Qty: 20 TABLET | Refills: 0 | Status: SHIPPED | OUTPATIENT
Start: 2021-12-29 | End: 2022-01-27 | Stop reason: SDUPTHER

## 2021-12-29 RX ORDER — BUTALBITAL, ACETAMINOPHEN AND CAFFEINE 50; 325; 40 MG/1; MG/1; MG/1
1 TABLET ORAL EVERY 4 HOURS PRN
Qty: 20 TABLET | Refills: 0 | Status: SHIPPED | OUTPATIENT
Start: 2021-12-29 | End: 2022-01-27 | Stop reason: SDUPTHER

## 2022-01-27 DIAGNOSIS — G43.009 MIGRAINE WITHOUT AURA AND WITHOUT STATUS MIGRAINOSUS, NOT INTRACTABLE: ICD-10-CM

## 2022-01-27 DIAGNOSIS — K92.0 COFFEE GROUND EMESIS: ICD-10-CM

## 2022-01-27 RX ORDER — BUTALBITAL, ACETAMINOPHEN AND CAFFEINE 50; 325; 40 MG/1; MG/1; MG/1
1 TABLET ORAL EVERY 4 HOURS PRN
Qty: 20 TABLET | Refills: 0 | Status: SHIPPED | OUTPATIENT
Start: 2022-01-27 | End: 2022-02-28 | Stop reason: SDUPTHER

## 2022-01-27 RX ORDER — ONDANSETRON 4 MG/1
4 TABLET, ORALLY DISINTEGRATING ORAL EVERY 8 HOURS PRN
Qty: 20 TABLET | Refills: 0 | Status: SHIPPED | OUTPATIENT
Start: 2022-01-27 | End: 2022-02-28 | Stop reason: SDUPTHER

## 2022-01-27 NOTE — TELEPHONE ENCOUNTER
Last visit: 11/29/21  Last Med refill: butalbital 12/29/21, Zofran 12/29/21  Does patient have enough medication for 72 hours: Yes. Next Visit Date:  No future appointments. Health Maintenance   Topic Date Due    Hepatitis C screen  Never done    Pap smear  Never done    COVID-19 Vaccine (2 - Booster for [place] series) 07/15/2021    Flu vaccine (1) 09/01/2021    Depression Monitoring  11/29/2022    DTaP/Tdap/Td vaccine (2 - Td or Tdap) 03/06/2029    Pneumococcal 0-64 years Vaccine (2 of 2 - PPSV23) 09/20/2058    HIV screen  Completed    Hepatitis A vaccine  Aged Out    Hepatitis B vaccine  Aged Out    Hib vaccine  Aged Out    Meningococcal (ACWY) vaccine  Aged Out    Varicella vaccine  Discontinued       Hemoglobin A1C (%)   Date Value   04/08/2021 5.2   07/29/2020 5.3   12/09/2019 5.4             ( goal A1C is < 7)   No results found for: LABMICR  LDL Cholesterol (mg/dL)   Date Value   07/29/2020 127   12/09/2019 129     LDL Calculated (mg/dL)   Date Value   04/08/2021 172 (H)       (goal LDL is <100)   AST (U/L)   Date Value   04/08/2021 20     ALT (U/L)   Date Value   04/08/2021 24     BUN (mg/dL)   Date Value   06/19/2021 9     BP Readings from Last 3 Encounters:   11/29/21 110/80   10/20/21 98/74   08/17/21 102/70          (goal 120/80)    All Future Testing planned in [facility]  Lab Frequency Next Occurrence   XR KNEE RIGHT (1-2 VIEWS) Once 04/08/2022   Lactate Dehydrogenase Once 04/08/2022   Calprotectin Stool Once 04/08/2022   Clostridium Difficile Toxin/Antigen Once 04/08/2022   Gastrointestinal Panel, Molecular Once 04/08/2022   Fecal lactoferrin Once 04/08/2022   Blood Occult Stool #1 Once 04/08/2022   H.  Pylori Antigen, EIA Once 04/08/2022   CT CHEST ABDOMEN PELVIS W CONTRAST Once 07/01/2021   Genesight Psychotropic (combinatorial pharmacogenomics test) Once 11/29/2021   XR CHEST STANDARD (2 VW) Once 01/13/2022               Patient Active Problem List:     Migraine headache Vaginal bleeding     Anxiety     Vitamin D insufficiency     PTSD (post-traumatic stress disorder)     Excessive and frequent menstruation     Adult body mass index 40 and over     Attention deficit disorder without hyperactivity     Calculus of gallbladder with chronic cholecystitis without obstruction     Developmental disorder of scholastic skill     Endometriosis     Esophageal reflux     Observation of other suspected mental condition     Pelvic congestion     Platelet dense granule deficiency (HCC)     Platelet function defect ([address]     Morbid obesity ([address]     Recurrent dislocation of patella, right knee     Tobacco use disorder     Cyclical vomiting     Depressive disorder

## 2022-02-02 PROBLEM — D12.6 TUBULAR ADENOMA OF COLON: Status: ACTIVE | Noted: 2022-02-02

## 2022-02-28 DIAGNOSIS — G43.009 MIGRAINE WITHOUT AURA AND WITHOUT STATUS MIGRAINOSUS, NOT INTRACTABLE: ICD-10-CM

## 2022-02-28 DIAGNOSIS — K92.0 COFFEE GROUND EMESIS: ICD-10-CM

## 2022-03-01 RX ORDER — BUTALBITAL, ACETAMINOPHEN AND CAFFEINE 50; 325; 40 MG/1; MG/1; MG/1
1 TABLET ORAL EVERY 4 HOURS PRN
Qty: 20 TABLET | Refills: 0 | Status: SHIPPED | OUTPATIENT
Start: 2022-03-01 | End: 2022-03-21 | Stop reason: SDUPTHER

## 2022-03-01 RX ORDER — ONDANSETRON 4 MG/1
4 TABLET, ORALLY DISINTEGRATING ORAL EVERY 8 HOURS PRN
Qty: 20 TABLET | Refills: 0 | Status: SHIPPED | OUTPATIENT
Start: 2022-03-01 | End: 2022-03-21 | Stop reason: SDUPTHER

## 2022-03-01 NOTE — TELEPHONE ENCOUNTER
Last visit: 11/29/21  Last Med refill: Fioricet 01/27/22, Zofran 01/27/22  Does patient have enough medication for 72 hours: Yes     Next Visit Date:  No future appointments. Health Maintenance   Topic Date Due    Hepatitis C screen  Never done    COVID-19 Vaccine (2 - Booster for Page [employer] series) 07/15/2021    Flu vaccine (1) 09/01/2021    Depression Monitoring  11/29/2022    Colorectal Cancer Screen  07/01/2024    Pap smear  02/18/2025    DTaP/Tdap/Td vaccine (2 - Td or Tdap) 03/06/2029    Pneumococcal 0-64 years Vaccine (2 of 2 - PPSV23) 09/20/2058    HIV screen  Completed    Hepatitis A vaccine  Aged Out    Hepatitis B vaccine  Aged Out    Hib vaccine  Aged Out    Meningococcal (ACWY) vaccine  Aged Out    Varicella vaccine  Discontinued       Hemoglobin A1C (%)   Date Value   04/08/2021 5.2   07/29/2020 5.3   12/09/2019 5.4             ( goal A1C is < 7)   No results found for: LABMICR  LDL Cholesterol (mg/dL)   Date Value   07/29/2020 127   12/09/2019 129     LDL Calculated (mg/dL)   Date Value   04/08/2021 172 (H)       (goal LDL is <100)   AST (U/L)   Date Value   04/08/2021 20     ALT (U/L)   Date Value   04/08/2021 24     BUN (mg/dL)   Date Value   06/19/2021 9     BP Readings from Last 3 Encounters:   11/29/21 110/80   10/20/21 98/74   08/17/21 102/70          (goal 120/80)    All Future Testing planned in [facility]  Lab Frequency Next Occurrence   XR KNEE RIGHT (1-2 VIEWS) Once 04/08/2022   Lactate Dehydrogenase Once 04/08/2022   Calprotectin Stool Once 04/08/2022   Clostridium Difficile Toxin/Antigen Once 04/08/2022   Gastrointestinal Panel, Molecular Once 04/08/2022   Fecal lactoferrin Once 04/08/2022   Blood Occult Stool #1 Once 04/08/2022   H.  Pylori Antigen, EIA Once 04/08/2022   CT CHEST ABDOMEN PELVIS W CONTRAST Once 07/01/2021   Genesight Psychotropic (combinatorial pharmacogenomics test) Once 11/29/2021   XR CHEST STANDARD (2 VW) Once 03/14/2022               Patient Active Problem List:     Migraine headache     Vaginal bleeding     Anxiety     Vitamin D insufficiency     PTSD (post-traumatic stress disorder)     Excessive and frequent menstruation     Adult body mass index 40 and over     Attention deficit disorder without hyperactivity     Calculus of gallbladder with chronic cholecystitis without obstruction     Developmental disorder of scholastic skill     Endometriosis     Esophageal reflux     Observation of other suspected mental condition     Pelvic congestion     Platelet dense granule deficiency (HCC)     Platelet function defect ([address]     Morbid obesity ([address]     Recurrent dislocation of patella, right knee     Tobacco use disorder     Cyclical vomiting     Depressive disorder     Tubular adenoma of colon

## 2022-03-21 DIAGNOSIS — G43.009 MIGRAINE WITHOUT AURA AND WITHOUT STATUS MIGRAINOSUS, NOT INTRACTABLE: ICD-10-CM

## 2022-03-21 DIAGNOSIS — K92.0 COFFEE GROUND EMESIS: ICD-10-CM

## 2022-03-21 NOTE — TELEPHONE ENCOUNTER
Last visit: 11/29/21  Last Med refill: Fioricet 03/01/22, Zofran 03/01/22  Does patient have enough medication for 72 hours: Yes    Next Visit Date:  No future appointments. Health Maintenance   Topic Date Due    Hepatitis C screen  Never done    COVID-19 Vaccine (2 - Booster for [employer] series) 07/15/2021    Flu vaccine (1) 09/01/2021    Depression Monitoring  11/29/2022    Colorectal Cancer Screen  07/01/2024    Pap smear  02/18/2025    DTaP/Tdap/Td vaccine (2 - Td or Tdap) 03/06/2029    Pneumococcal 0-64 years Vaccine (2 of 2 - PPSV23) 09/20/2058    HIV screen  Completed    Hepatitis A vaccine  Aged Out    Hepatitis B vaccine  Aged Out    Hib vaccine  Aged Out    Meningococcal (ACWY) vaccine  Aged Out    Varicella vaccine  Discontinued       Hemoglobin A1C (%)   Date Value   04/08/2021 5.2   07/29/2020 5.3   12/09/2019 5.4             ( goal A1C is < 7)   No results found for: LABMICR  LDL Cholesterol (mg/dL)   Date Value   07/29/2020 127   12/09/2019 129     LDL Calculated (mg/dL)   Date Value   04/08/2021 172 (H)       (goal LDL is <100)   AST (U/L)   Date Value   04/08/2021 20     ALT (U/L)   Date Value   04/08/2021 24     BUN (mg/dL)   Date Value   06/19/2021 9     BP Readings from Last 3 Encounters:   11/29/21 110/80   10/20/21 98/74   08/17/21 102/70          (goal 120/80)    All Future Testing planned in [facility]  Lab Frequency Next Occurrence   XR KNEE RIGHT (1-2 VIEWS) Once 04/08/2022   Lactate Dehydrogenase Once 04/08/2022   Calprotectin Stool Once 04/08/2022   Clostridium Difficile Toxin/Antigen Once 04/08/2022   Gastrointestinal Panel, Molecular Once 04/08/2022   Fecal lactoferrin Once 04/08/2022   Blood Occult Stool #1 Once 04/08/2022   H.  Pylori Antigen, EIA Once 04/08/2022   CT CHEST ABDOMEN PELVIS W CONTRAST Once 07/01/2021   Genesight Psychotropic (combinatorial pharmacogenomics test) Once 11/29/2021   XR CHEST STANDARD (2 VW) Once 03/14/2022               Patient Active Problem List:     Migraine headache     Vaginal bleeding     Anxiety     Vitamin D insufficiency     PTSD (post-traumatic stress disorder)     Excessive and frequent menstruation     Adult body mass index 40 and over     Attention deficit disorder without hyperactivity     Calculus of gallbladder with chronic cholecystitis without obstruction     Developmental disorder of scholastic skill     Endometriosis     Esophageal reflux     Observation of other suspected mental condition     Pelvic congestion     Platelet dense granule deficiency (HCC)     Platelet function defect ([address]     Morbid obesity ([address]     Recurrent dislocation of patella, right knee     Tobacco use disorder     Cyclical vomiting     Depressive disorder     Tubular adenoma of colon

## 2022-03-22 RX ORDER — ONDANSETRON 4 MG/1
4 TABLET, ORALLY DISINTEGRATING ORAL EVERY 8 HOURS PRN
Qty: 20 TABLET | Refills: 0 | Status: SHIPPED | OUTPATIENT
Start: 2022-03-22 | End: 2022-05-11 | Stop reason: SDUPTHER

## 2022-03-22 RX ORDER — BUTALBITAL, ACETAMINOPHEN AND CAFFEINE 50; 325; 40 MG/1; MG/1; MG/1
1 TABLET ORAL EVERY 4 HOURS PRN
Qty: 20 TABLET | Refills: 0 | Status: SHIPPED | OUTPATIENT
Start: 2022-03-22 | End: 2022-04-09 | Stop reason: SDUPTHER

## 2022-04-09 DIAGNOSIS — G43.009 MIGRAINE WITHOUT AURA AND WITHOUT STATUS MIGRAINOSUS, NOT INTRACTABLE: ICD-10-CM

## 2022-04-11 RX ORDER — BUTALBITAL, ACETAMINOPHEN AND CAFFEINE 50; 325; 40 MG/1; MG/1; MG/1
1 TABLET ORAL EVERY 4 HOURS PRN
Qty: 20 TABLET | Refills: 0 | Status: SHIPPED | OUTPATIENT
Start: 2022-04-11 | End: 2022-05-11 | Stop reason: SDUPTHER

## 2022-04-11 NOTE — TELEPHONE ENCOUNTER
Last visit: 11/29/21  Last Med refill: 03/22/22  Does patient have enough medication for 72 hours: Yes    Next Visit Date:  No future appointments.     Health Maintenance   Topic Date Due    Hepatitis C screen  Never done    COVID-19 Vaccine (2 - Booster for tabulate series) 07/15/2021    Flu vaccine (Season Ended) 09/01/2022    Depression Monitoring  11/29/2022    Colorectal Cancer Screen  07/01/2024    Pap smear  02/18/2025    DTaP/Tdap/Td vaccine (2 - Td or Tdap) 03/06/2029    Pneumococcal 0-64 years Vaccine (2 of 2 - PPSV23) 09/20/2058    HIV screen  Completed    Hepatitis A vaccine  Aged Out    Hepatitis B vaccine  Aged Out    Hib vaccine  Aged Out    Meningococcal (ACWY) vaccine  Aged Out    Varicella vaccine  Discontinued       Hemoglobin A1C (%)   Date Value   04/08/2021 5.2   07/29/2020 5.3   12/09/2019 5.4             ( goal A1C is < 7)   No results found for: LABMICR  LDL Cholesterol (mg/dL)   Date Value   07/29/2020 127   12/09/2019 129     LDL Calculated (mg/dL)   Date Value   04/08/2021 172 (H)       (goal LDL is <100)   AST (U/L)   Date Value   04/08/2021 20     ALT (U/L)   Date Value   04/08/2021 24     BUN (mg/dL)   Date Value   06/19/2021 9     BP Readings from Last 3 Encounters:   11/29/21 110/80   10/20/21 98/74   08/17/21 102/70          (goal 120/80)    All Future Testing planned in [facility]  Lab Frequency Next Occurrence   CT CHEST ABDOMEN PELVIS W CONTRAST Once 07/01/2021   Genesight Psychotropic (combinatorial pharmacogenomics test) Once 11/29/2021   XR CHEST STANDARD (2 VW) Once 03/14/2022               Patient Active Problem List:     Migraine headache     Vaginal bleeding     Anxiety     Vitamin D insufficiency     PTSD (post-traumatic stress disorder)     Excessive and frequent menstruation     Adult body mass index 40 and over     Attention deficit disorder without hyperactivity     Calculus of gallbladder with chronic cholecystitis without obstruction     Developmental disorder of scholastic skill     Endometriosis     Esophageal reflux     Observation of other suspected mental condition     Pelvic congestion     Platelet dense granule deficiency (HCC)     Platelet function defect (HCC)     Morbid obesity ([address]     Recurrent dislocation of patella, right knee     Tobacco use disorder     Cyclical vomiting     Depressive disorder     Tubular adenoma of colon

## 2022-05-11 DIAGNOSIS — G43.009 MIGRAINE WITHOUT AURA AND WITHOUT STATUS MIGRAINOSUS, NOT INTRACTABLE: ICD-10-CM

## 2022-05-11 DIAGNOSIS — K92.0 COFFEE GROUND EMESIS: ICD-10-CM

## 2022-05-11 RX ORDER — BUTALBITAL, ACETAMINOPHEN AND CAFFEINE 50; 325; 40 MG/1; MG/1; MG/1
1 TABLET ORAL EVERY 4 HOURS PRN
Qty: 20 TABLET | Refills: 0 | Status: SHIPPED | OUTPATIENT
Start: 2022-05-11 | End: 2022-06-09 | Stop reason: SDUPTHER

## 2022-05-11 RX ORDER — ONDANSETRON 4 MG/1
4 TABLET, ORALLY DISINTEGRATING ORAL EVERY 8 HOURS PRN
Qty: 20 TABLET | Refills: 0 | Status: SHIPPED | OUTPATIENT
Start: 2022-05-11 | End: 2022-06-09 | Stop reason: SDUPTHER

## 2022-05-11 NOTE — TELEPHONE ENCOUNTER
Last visit: 11/29/21  Last Med refill: Fioricet 04/11/22, Zofran 03/22/22  Does patient have enough medication for 72 hours: Yes    Next Visit Date:  No future appointments.     Health Maintenance   Topic Date Due    Hepatitis C screen  Never done    Pneumococcal 0-64 years Vaccine (2 - PCV) 03/08/2020    COVID-19 Vaccine (2 - Booster for [employer] series) 07/15/2021    Flu vaccine (Season Ended) 09/01/2022    Depression Monitoring  11/29/2022    Colorectal Cancer Screen  07/01/2024    Pap smear  02/18/2025    DTaP/Tdap/Td vaccine (2 - Td or Tdap) 03/06/2029    HIV screen  Completed    Hepatitis A vaccine  Aged Out    Hepatitis B vaccine  Aged Out    Hib vaccine  Aged Out    Meningococcal (ACWY) vaccine  Aged Out    Varicella vaccine  Discontinued       Hemoglobin A1C (%)   Date Value   04/08/2021 5.2   07/29/2020 5.3   12/09/2019 5.4             ( goal A1C is < 7)   No results found for: LABMICR  LDL Cholesterol (mg/dL)   Date Value   07/29/2020 127   12/09/2019 129     LDL Calculated (mg/dL)   Date Value   04/08/2021 172 (H)       (goal LDL is <100)   AST (U/L)   Date Value   04/08/2021 20     ALT (U/L)   Date Value   04/08/2021 24     BUN (mg/dL)   Date Value   06/19/2021 9     BP Readings from Last 3 Encounters:   11/29/21 110/80   10/20/21 98/74   08/17/21 102/70          (goal 120/80)    All Future Testing planned in [facility]  Lab Frequency Next Occurrence   CT CHEST ABDOMEN PELVIS W CONTRAST Once 07/01/2021   Genesight Psychotropic (combinatorial pharmacogenomics test) Once 11/29/2021   XR CHEST STANDARD (2 VW) Once 03/14/2022               Patient Active Problem List:     Migraine headache     Vaginal bleeding     Anxiety     Vitamin D insufficiency     PTSD (post-traumatic stress disorder)     Excessive and frequent menstruation     Adult body mass index 40 and over     Attention deficit disorder without hyperactivity     Calculus of gallbladder with chronic cholecystitis without obstruction Developmental disorder of scholastic skill     Endometriosis     Esophageal reflux     Observation of other suspected mental condition     Pelvic congestion     Platelet dense granule deficiency (HCC)     Platelet function defect (HCC)     Morbid obesity (HCC)     Recurrent dislocation of patella, right knee     Tobacco use disorder     Cyclical vomiting     Depressive disorder     Tubular adenoma of colon

## 2022-06-09 DIAGNOSIS — E55.9 VITAMIN D INSUFFICIENCY: ICD-10-CM

## 2022-06-09 DIAGNOSIS — K92.0 COFFEE GROUND EMESIS: ICD-10-CM

## 2022-06-09 DIAGNOSIS — G43.009 MIGRAINE WITHOUT AURA AND WITHOUT STATUS MIGRAINOSUS, NOT INTRACTABLE: ICD-10-CM

## 2022-06-09 RX ORDER — NAPROXEN 500 MG/1
500 TABLET ORAL 2 TIMES DAILY WITH MEALS
Qty: 60 TABLET | Refills: 0 | OUTPATIENT
Start: 2022-06-09

## 2022-06-09 NOTE — TELEPHONE ENCOUNTER
[facility] note from [facility] states 1 prescription, any subsequent refills would be OTC or through her PCP

## 2022-06-14 NOTE — TELEPHONE ENCOUNTER
LOV: 11/29/2021  LRF: VIT D-11/29/2021; LPIOXX AND FIORICET- 05/11/2022  RTO: NONE  Health Maintenance   Topic Date Due    Hepatitis C screen  Never done    Pneumococcal 0-64 years Vaccine (2 - PCV) 03/08/2020    COVID-19 Vaccine (2 - Booster for [employer] series) 07/15/2021    Flu vaccine (Season Ended) 09/01/2022    Depression Monitoring  11/29/2022    Colorectal Cancer Screen  07/01/2024    Pap smear  02/18/2025    DTaP/Tdap/Td vaccine (2 - Td or Tdap) 03/06/2029    HIV screen  Completed    Hepatitis A vaccine  Aged Out    Hepatitis B vaccine  Aged Out    Hib vaccine  Aged Out    Meningococcal (ACWY) vaccine  Aged Out    Varicella vaccine  Discontinued             (applicable per patient's age: Cancer Screenings, Depression Screening, Fall Risk Screening, Immunizations)    Hemoglobin A1C (%)   Date Value   04/08/2021 5.2   07/29/2020 5.3   12/09/2019 5.4     LDL Cholesterol (mg/dL)   Date Value   07/29/2020 127     LDL Calculated (mg/dL)   Date Value   04/08/2021 172 (H)     AST (U/L)   Date Value   04/08/2021 20     ALT (U/L)   Date Value   04/08/2021 24     BUN (mg/dL)   Date Value   06/19/2021 9      (goal A1C is < 7)   (goal LDL is <100) need 30-50% reduction from baseline     BP Readings from Last 3 Encounters:   11/29/21 110/80   10/20/21 98/74   08/17/21 102/70    (goal /80)      All Future Testing planned in [facility]:  Lab Frequency Next Occurrence   CT CHEST ABDOMEN PELVIS W CONTRAST Once 07/01/2021   Genesight Psychotropic (combinatorial pharmacogenomics test) Once 11/29/2021   XR CHEST STANDARD (2 VW) Once 03/14/2022       Next Visit Date:  No future appointments.          Patient Active Problem List:     Migraine headache     Vaginal bleeding     Anxiety     Vitamin D insufficiency     PTSD (post-traumatic stress disorder)     Excessive and frequent menstruation     Adult body mass index 40 and over     Attention deficit disorder without hyperactivity     Calculus of gallbladder with chronic cholecystitis without obstruction     Developmental disorder of scholastic skill     Endometriosis     Esophageal reflux     Observation of other suspected mental condition     Pelvic congestion     Platelet dense granule deficiency (HCC)     Platelet function defect ([address]     Morbid obesity ([address]     Recurrent dislocation of patella, right knee     Tobacco use disorder     Cyclical vomiting     Depressive disorder     Tubular adenoma of colon

## 2022-06-15 RX ORDER — ONDANSETRON 4 MG/1
4 TABLET, ORALLY DISINTEGRATING ORAL EVERY 8 HOURS PRN
Qty: 20 TABLET | Refills: 0 | Status: SHIPPED | OUTPATIENT
Start: 2022-06-15 | End: 2022-07-13 | Stop reason: SDUPTHER

## 2022-06-15 RX ORDER — ERGOCALCIFEROL 1.25 MG/1
50000 CAPSULE ORAL WEEKLY
Qty: 12 CAPSULE | Refills: 1 | Status: SHIPPED | OUTPATIENT
Start: 2022-06-15 | End: 2022-11-30

## 2022-06-15 RX ORDER — BUTALBITAL, ACETAMINOPHEN AND CAFFEINE 50; 325; 40 MG/1; MG/1; MG/1
1 TABLET ORAL EVERY 4 HOURS PRN
Qty: 20 TABLET | Refills: 0 | Status: SHIPPED | OUTPATIENT
Start: 2022-06-15 | End: 2022-07-13 | Stop reason: SDUPTHER

## 2022-07-13 DIAGNOSIS — E55.9 VITAMIN D INSUFFICIENCY: ICD-10-CM

## 2022-07-13 DIAGNOSIS — K92.0 COFFEE GROUND EMESIS: ICD-10-CM

## 2022-07-13 DIAGNOSIS — G43.009 MIGRAINE WITHOUT AURA AND WITHOUT STATUS MIGRAINOSUS, NOT INTRACTABLE: ICD-10-CM

## 2022-07-14 RX ORDER — NAPROXEN 500 MG/1
500 TABLET ORAL 2 TIMES DAILY WITH MEALS
Qty: 60 TABLET | Refills: 0 | OUTPATIENT
Start: 2022-07-14

## 2022-07-19 ENCOUNTER — TELEPHONE (OUTPATIENT)
Dept: FAMILY MEDICINE CLINIC | Age: 27
End: 2022-07-19

## 2022-07-19 RX ORDER — BUTALBITAL, ACETAMINOPHEN AND CAFFEINE 50; 325; 40 MG/1; MG/1; MG/1
1 TABLET ORAL EVERY 4 HOURS PRN
Qty: 20 TABLET | Refills: 0 | Status: SHIPPED | OUTPATIENT
Start: 2022-07-19 | End: 2023-07-19

## 2022-07-19 RX ORDER — ONDANSETRON 4 MG/1
4 TABLET, ORALLY DISINTEGRATING ORAL EVERY 8 HOURS PRN
Qty: 20 TABLET | Refills: 0 | Status: SHIPPED | OUTPATIENT
Start: 2022-07-19 | End: 2023-07-19

## 2022-07-19 RX ORDER — ERGOCALCIFEROL 1.25 MG/1
50000 CAPSULE ORAL WEEKLY
Qty: 12 CAPSULE | Refills: 1 | OUTPATIENT
Start: 2022-07-19 | End: 2023-01-03

## 2022-07-28 DIAGNOSIS — K92.0 COFFEE GROUND EMESIS: ICD-10-CM

## 2022-07-28 DIAGNOSIS — G43.009 MIGRAINE WITHOUT AURA AND WITHOUT STATUS MIGRAINOSUS, NOT INTRACTABLE: ICD-10-CM

## 2022-07-28 RX ORDER — NAPROXEN 500 MG/1
500 TABLET ORAL 2 TIMES DAILY WITH MEALS
Qty: 60 TABLET | Refills: 0 | Status: CANCELLED | OUTPATIENT
Start: 2022-07-28

## 2022-07-29 RX ORDER — BUTALBITAL, ACETAMINOPHEN AND CAFFEINE 50; 325; 40 MG/1; MG/1; MG/1
1 TABLET ORAL EVERY 4 HOURS PRN
Qty: 20 TABLET | Refills: 0 | OUTPATIENT
Start: 2022-07-29 | End: 2023-07-29

## 2022-07-29 RX ORDER — ONDANSETRON 4 MG/1
4 TABLET, ORALLY DISINTEGRATING ORAL EVERY 8 HOURS PRN
Qty: 20 TABLET | Refills: 0 | OUTPATIENT
Start: 2022-07-29 | End: 2023-07-29

## 2022-07-29 NOTE — TELEPHONE ENCOUNTER
If she has significant GI issues I would like her to get her refills from her family doctor for NSAIDs. She has times where she has coffee-ground emesis which could be bleeding and naproxen could cause increased bleeding if she is taking it on a regular basis.

## 2022-08-03 DIAGNOSIS — M25.561 RIGHT KNEE PAIN, UNSPECIFIED CHRONICITY: Primary | ICD-10-CM

## 2022-08-23 DIAGNOSIS — K92.0 COFFEE GROUND EMESIS: ICD-10-CM

## 2022-08-23 DIAGNOSIS — G43.009 MIGRAINE WITHOUT AURA AND WITHOUT STATUS MIGRAINOSUS, NOT INTRACTABLE: ICD-10-CM

## 2022-08-23 NOTE — TELEPHONE ENCOUNTER
LOV 11/29/21  RTO As needed  LRF 7/19/22    Health Maintenance   Topic Date Due    Pneumococcal 0-64 years Vaccine (2 - PCV) 03/08/2020    COVID-19 Vaccine (2 - Booster for [employer]aya series) 07/15/2021    Flu vaccine (1) 09/01/2022    Depression Monitoring  11/29/2022    Colorectal Cancer Screen  07/01/2024    Pap smear  02/18/2025    DTaP/Tdap/Td vaccine (2 - Td or Tdap) 03/06/2029    Hepatitis C screen  Completed    HIV screen  Completed    Hepatitis A vaccine  Aged Out    Hepatitis B vaccine  Aged Out    Hib vaccine  Aged Out    Meningococcal (ACWY) vaccine  Aged Out    Varicella vaccine  Discontinued             (applicable per patient's age: Cancer Screenings, Depression Screening, Fall Risk Screening, Immunizations)    Hemoglobin A1C (%)   Date Value   04/08/2021 5.2   07/29/2020 5.3   12/09/2019 5.4     LDL Cholesterol (mg/dL)   Date Value   07/29/2020 127     LDL Calculated (mg/dL)   Date Value   04/08/2021 172 (H)     AST (U/L)   Date Value   04/08/2021 20     ALT (U/L)   Date Value   04/08/2021 24     BUN (mg/dL)   Date Value   06/19/2021 9      (goal A1C is < 7)   (goal LDL is <100) need 30-50% reduction from baseline     BP Readings from Last 3 Encounters:   11/29/21 110/80   10/20/21 98/74   08/17/21 102/70    (goal /80)      All Future Testing planned in [facility]:  Lab Frequency Next Occurrence   Genesight Psychotropic (combinatorial pharmacogenomics test) Once 11/29/2021   XR CHEST STANDARD (2 VW) Once 03/14/2022   XR KNEE RIGHT (MIN 4 VIEWS) Once 08/03/2022       Next Visit Date:  No future appointments.          Patient Active Problem List:     Migraine headache     Vaginal bleeding     Anxiety     Vitamin D insufficiency     PTSD (post-traumatic stress disorder)     Excessive and frequent menstruation     Adult body mass index 40 and over     Attention deficit disorder without hyperactivity     Calculus of gallbladder with chronic cholecystitis without obstruction     Developmental disorder of scholastic skill     Endometriosis     Esophageal reflux     Observation of other suspected mental condition     Pelvic congestion     Platelet dense granule deficiency (HCC)     Platelet function defect (HCC)     Morbid obesity (HCC)     Recurrent dislocation of patella, right knee     Tobacco use disorder     Cyclical vomiting     Depressive disorder     Tubular adenoma of colon

## 2022-08-24 RX ORDER — ONDANSETRON 4 MG/1
4 TABLET, ORALLY DISINTEGRATING ORAL EVERY 8 HOURS PRN
Qty: 20 TABLET | Refills: 0 | OUTPATIENT
Start: 2022-08-24 | End: 2023-08-24

## 2022-08-24 RX ORDER — BUTALBITAL, ACETAMINOPHEN AND CAFFEINE 50; 325; 40 MG/1; MG/1; MG/1
1 TABLET ORAL EVERY 4 HOURS PRN
Qty: 20 TABLET | Refills: 0 | OUTPATIENT
Start: 2022-08-24 | End: 2023-08-24

## 2022-08-31 ENCOUNTER — TELEPHONE (OUTPATIENT)
Dept: FAMILY MEDICINE CLINIC | Age: 27
End: 2022-08-31

## 2022-09-02 DIAGNOSIS — K92.0 COFFEE GROUND EMESIS: ICD-10-CM

## 2022-09-02 DIAGNOSIS — G43.009 MIGRAINE WITHOUT AURA AND WITHOUT STATUS MIGRAINOSUS, NOT INTRACTABLE: ICD-10-CM

## 2022-09-02 NOTE — TELEPHONE ENCOUNTER
These have been refused multiple times bc pt needs appt. Multiple attempts to contact over the last year and no follow up from pt. Sent letter since we are unable to reach via telephone or mychart.

## 2022-09-06 RX ORDER — ONDANSETRON 4 MG/1
TABLET, ORALLY DISINTEGRATING ORAL
Qty: 20 TABLET | Refills: 0 | OUTPATIENT
Start: 2022-09-06

## 2022-09-06 RX ORDER — BUTALBITAL, ACETAMINOPHEN AND CAFFEINE 50; 325; 40 MG/1; MG/1; MG/1
TABLET ORAL
Qty: 20 TABLET | Refills: 0 | OUTPATIENT
Start: 2022-09-06

## 2022-09-20 ENCOUNTER — OFFICE VISIT (OUTPATIENT)
Dept: PRIMARY CARE CLINIC | Age: 27
End: 2022-09-20
Payer: MEDICARE

## 2022-09-20 VITALS
SYSTOLIC BLOOD PRESSURE: 106 MMHG | DIASTOLIC BLOOD PRESSURE: 72 MMHG | WEIGHT: 204 LBS | OXYGEN SATURATION: 97 % | HEART RATE: 74 BPM | TEMPERATURE: 98 F | BODY MASS INDEX: 36.14 KG/M2

## 2022-09-20 DIAGNOSIS — R30.0 DYSURIA: ICD-10-CM

## 2022-09-20 DIAGNOSIS — N30.00 ACUTE CYSTITIS WITHOUT HEMATURIA: Primary | ICD-10-CM

## 2022-09-20 PROCEDURE — G8427 DOCREV CUR MEDS BY ELIG CLIN: HCPCS

## 2022-09-20 PROCEDURE — 81003 URINALYSIS AUTO W/O SCOPE: CPT

## 2022-09-20 PROCEDURE — 99212 OFFICE O/P EST SF 10 MIN: CPT

## 2022-09-20 PROCEDURE — G8417 CALC BMI ABV UP PARAM F/U: HCPCS

## 2022-09-20 PROCEDURE — 4004F PT TOBACCO SCREEN RCVD TLK: CPT

## 2022-09-20 RX ORDER — HYDROXYZINE PAMOATE 25 MG/1
CAPSULE ORAL
COMMUNITY
Start: 2022-08-18

## 2022-09-20 RX ORDER — PROPRANOLOL HYDROCHLORIDE 10 MG/1
TABLET ORAL
COMMUNITY
Start: 2022-09-13

## 2022-09-20 RX ORDER — BUPROPION HYDROCHLORIDE 150 MG/1
TABLET ORAL
COMMUNITY
Start: 2022-09-13

## 2022-09-20 RX ORDER — TRAZODONE HYDROCHLORIDE 50 MG/1
TABLET ORAL
COMMUNITY
Start: 2022-09-16

## 2022-09-20 ASSESSMENT — ENCOUNTER SYMPTOMS
RESPIRATORY NEGATIVE: 1
NAUSEA: 1
DIARRHEA: 1
VOMITING: 0
ABDOMINAL PAIN: 0

## 2022-09-20 NOTE — PROGRESS NOTES
[address] [place]  [facility] [address]    [address] [place]  [address] [place] [id number]  Dept: [phone]    Georgina Buerger is a 32 y.o. female Established patient, who presents to the walk-in clinic today with conditions/complaints as noted below:    Chief Complaint   Patient presents with    Hematuria     Blood in urine. Started a week ago. Painful to urinate. HPI:     Urinary Tract Infection  This is a new problem. The current episode started 1 to 4 weeks ago (1 week ago). The problem is unchanged. Associated symptoms include hematuria and pain. The pain is present in the suprapubic region. Her pain is at a severity of 6/10.      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[facility])     Depression     Hypotension     side effect of Inderal and Catapres taken for anxiety    Learning disability     Non-verbal learning disability, has no real concept of time, money, misinterprets body language    Nausea & vomiting     been going on since June/July 2020, is being worked up by Dr Reynaldo Deshpande    Obesity     PONV (postoperative nausea and vomiting)     usually has a scopalamine patch for surgery    Seizures ([facility] [address] [facility]     had one seizure in early adolescence none since    SOB (shortness of breath)     with activity at times, unable to climb a flight of stairs without SOB       Current Outpatient Medications   Medication Sig Dispense Refill    nitrofurantoin, macrocrystal-monohydrate, (MACROBID) 100 MG capsule Take 1 capsule by mouth 2 times daily for 7 days 14 capsule 0    phenazopyridine (PYRIDIUM) 200 MG tablet Take 1 tablet by mouth 3 times daily as needed for Pain 6 tablet 0    buPROPion (WELLBUTRIN XL) 150 MG extended release tablet take 1 tablet by mouth daily      hydrOXYzine pamoate (VISTARIL) 25 MG capsule take 1 capsule by mouth three times a day if needed for nausea and vomiting      propranolol (INDERAL) 10 MG tablet take 1 tablet by mouth twice a day if needed      traZODone (DESYREL) 50 MG tablet TAKE 1/2 TO 2 TABLETS BY MOUTH AT BEDTIME AS NEEDED FOR SLEEP      ondansetron (ZOFRAN ODT) 4 MG disintegrating tablet Take 1 tablet by mouth every 8 hours as needed for Nausea 20 tablet 0    butalbital-acetaminophen-caffeine (FIORICET, ESGIC) -40 MG per tablet Take 1 tablet by mouth every 4 hours as needed for Headaches 20 tablet 0    vitamin D (ERGOCALCIFEROL) 1.25 MG (47051 UT) CAPS capsule Take 1 capsule by mouth once a week 12 capsule 1    naproxen (NAPROSYN) 500 MG tablet Take 1 tablet by mouth 2 times daily (with meals) (Patient not taking: No sig reported) 60 tablet 0    albuterol sulfate  (90 Base) MCG/ACT inhaler Inhale 2 puffs into the lungs every 6 hours as needed  (Patient not taking: Reported on 9/20/2022)       No current facility-administered medications for this visit. Allergies   Allergen Reactions    Latex Rash    Poison Ivy Extract Rash     \"was one big welt\"    Sulfa Antibiotics Anaphylaxis       :     Review of Systems   Constitutional:  Negative for chills and fever. Respiratory: Negative. Cardiovascular: Negative. Gastrointestinal:  Positive for diarrhea and nausea. Negative for abdominal pain and vomiting. Genitourinary:  Positive for decreased urine volume, dysuria and hematuria. Negative for flank pain and vaginal discharge. Musculoskeletal: Negative. Skin:  Negative for rash.     :     /72 (Site: Left Upper Arm, Position: Sitting, Cuff Size: Medium Adult)   Pulse 74   Temp 98 °F (36.7 °C)   Wt 204 lb (92.5 kg)   SpO2 97%   BMI 36.14 kg/m²     Physical Exam  Vitals reviewed. Constitutional:       General: She is not in acute distress. Appearance: Normal appearance. She is obese. HENT:      Head: Normocephalic and atraumatic. Cardiovascular:      Rate and Rhythm: Normal rate and regular rhythm. Heart sounds: Normal heart sounds. Pulmonary:      Effort: Pulmonary effort is normal.      Breath sounds: Normal breath sounds. Abdominal:      General: Bowel sounds are normal. There is no distension. Palpations: Abdomen is soft. Tenderness: There is no abdominal tenderness. There is no right CVA tenderness or left CVA tenderness. Musculoskeletal:      Cervical back: Neck supple. Skin:     General: Skin is warm and dry. Findings: No rash. Neurological:      Mental Status: She is alert and oriented to person, place, and time. Psychiatric:         Mood and Affect: Mood normal.         Behavior: Behavior normal.         :          1. Acute cystitis without hematuria  -     Culture, Urine; Future  -     nitrofurantoin, macrocrystal-monohydrate, (MACROBID) 100 MG capsule; Take 1 capsule by mouth 2 times daily for 7 days, Disp-14 capsule, R-0Normal  -     phenazopyridine (PYRIDIUM) 200 MG tablet; Take 1 tablet by mouth 3 times daily as needed for Pain, Disp-6 tablet, R-0Normal  2. Dysuria  -     POCT Urinalysis No Micro (Auto)  -     nitrofurantoin, macrocrystal-monohydrate, (MACROBID) 100 MG capsule; Take 1 capsule by mouth 2 times daily for 7 days, Disp-14 capsule, R-0Normal     :      No follow-ups on file. Orders Placed This Encounter   Medications    nitrofurantoin, macrocrystal-monohydrate, (MACROBID) 100 MG capsule     Sig: Take 1 capsule by mouth 2 times daily for 7 days     Dispense:  14 capsule     Refill:  0    phenazopyridine (PYRIDIUM) 200 MG tablet     Sig: Take 1 tablet by mouth 3 times daily as needed for Pain     Dispense:  6 tablet     Refill:  0   No results found for this visit on 09/20/22. Urinalysis performed in office, sending urine for culture.   Instructed to finish the entire course of antibiotic treatment. Push oral hydration and avoid bladder irritants. Use pyridium as needed for burning/discomfort. Follow-up with PCP as needed. Patient and/or parent given educational materials - see patient instructions. Discussed use, benefit, and side effects of prescribed medications. All patient questions answered. Patient and/or parent voiced understanding.       Electronically signed by GIO Walker 9/21/2022 at 12:31 PM

## 2022-09-21 ENCOUNTER — HOSPITAL ENCOUNTER (OUTPATIENT)
Age: 27
Setting detail: SPECIMEN
Discharge: HOME | End: 2022-09-21

## 2022-09-21 LAB
BILIRUBIN, POC: ABNORMAL
BLOOD URINE, POC: ABNORMAL
CLARITY, POC: CLEAR
COLOR, POC: YELLOW
GLUCOSE URINE, POC: ABNORMAL
KETONES, POC: ABNORMAL
LEUKOCYTE EST, POC: ABNORMAL
NITRITE, POC: POSITIVE
PH, POC: 6.5
PROTEIN, POC: ABNORMAL
SPECIFIC GRAVITY, POC: 1.01
UROBILINOGEN, POC: 0.2

## 2022-09-21 RX ORDER — NITROFURANTOIN 25; 75 MG/1; MG/1
100 CAPSULE ORAL 2 TIMES DAILY
Qty: 14 CAPSULE | Refills: 0 | Status: SHIPPED | OUTPATIENT
Start: 2022-09-21 | End: 2022-09-24

## 2022-09-21 RX ORDER — PHENAZOPYRIDINE HYDROCHLORIDE 200 MG/1
200 TABLET, FILM COATED ORAL 3 TIMES DAILY PRN
Qty: 6 TABLET | Refills: 0 | Status: SHIPPED | OUTPATIENT
Start: 2022-09-21 | End: 2022-09-23

## 2022-09-22 DIAGNOSIS — N30.00 ACUTE CYSTITIS WITHOUT HEMATURIA: ICD-10-CM

## 2022-09-24 DIAGNOSIS — B95.1 GROUP B STREPTOCOCCAL UTI: ICD-10-CM

## 2022-09-24 DIAGNOSIS — N30.00 ACUTE CYSTITIS WITHOUT HEMATURIA: Primary | ICD-10-CM

## 2022-09-24 DIAGNOSIS — N39.0 GROUP B STREPTOCOCCAL UTI: ICD-10-CM

## 2022-09-24 LAB
CULTURE: ABNORMAL
CULTURE: ABNORMAL
SPECIMEN DESCRIPTION: ABNORMAL

## 2022-09-24 RX ORDER — CIPROFLOXACIN 250 MG/1
250 TABLET, FILM COATED ORAL 2 TIMES DAILY
Qty: 6 TABLET | Refills: 0 | Status: SHIPPED | OUTPATIENT
Start: 2022-09-24 | End: 2022-09-27

## 2022-09-24 NOTE — PROGRESS NOTES
Instructed to stop taking Macrobid and start on Cipro due to culture results. Follow-up if worsening or no improvement.

## 2023-05-02 ENCOUNTER — HOSPITAL ENCOUNTER (EMERGENCY)
Age: 28
Discharge: HOME | End: 2023-05-02
Attending: EMERGENCY MEDICINE
Payer: MEDICAID

## 2023-05-02 VITALS
SYSTOLIC BLOOD PRESSURE: 124 MMHG | HEIGHT: 63 IN | TEMPERATURE: 98.2 F | BODY MASS INDEX: 36.14 KG/M2 | RESPIRATION RATE: 18 BRPM | DIASTOLIC BLOOD PRESSURE: 89 MMHG | HEART RATE: 67 BPM | OXYGEN SATURATION: 97 %

## 2023-05-02 DIAGNOSIS — N76.4 ABSCESS OF LABIA MAJORA: Primary | ICD-10-CM

## 2023-05-02 PROCEDURE — 99282 EMERGENCY DEPT VISIT SF MDM: CPT

## 2023-05-02 PROCEDURE — 10060 I&D ABSCESS SIMPLE/SINGLE: CPT

## 2023-05-02 ASSESSMENT — PAIN DESCRIPTION - LOCATION: LOCATION: VAGINA

## 2023-05-02 ASSESSMENT — PAIN SCALES - GENERAL: PAINLEVEL_OUTOF10: 7

## 2023-05-02 ASSESSMENT — PAIN - FUNCTIONAL ASSESSMENT: PAIN_FUNCTIONAL_ASSESSMENT: 0-10

## 2023-05-02 NOTE — ED PROVIDER NOTES
[address]  eMERGENCY dEPARTMENT eNCOUnter   Independent Attestation     Pt Name: Johnnie Schilder  MRN: [id number]  [place] 1995  Date of evaluation: 5/2/23       Johnnie Schilder is a 32 y.o. female who presents with Groin Swelling      Vitals:   Vitals:    05/02/23 0944   BP: 124/89   Pulse: 67   Resp: 18   Temp: 98.2 °F (36.8 °C)   TempSrc: Oral   SpO2: 97%   Height: 5' 3\" (1.6 m)       Impression:   1. Abscess of labia majora          I was personally available for consultation in the Emergency Department. I have reviewed the chart and agree with the documentation as recorded by the [facility], including the assessment, treatment plan and disposition.     Sarita Perez MD  Attending Emergency  Physician        Milton Linda MD  05/02/23 [id number]

## 2023-05-02 NOTE — DISCHARGE INSTRUCTIONS
Lots of warm washclothes and baths today. It is advised that you keep your wound clean and dry otherwise. Wash it regularly. PLEASE RETURN TO THE EMERGENCY DEPARTMENT IMMEDIATELY if your symptoms worsen in anyway. You should immediately return to the ER for symptoms such as increasing pain, fever, excessive drainage from the wound, worsening warmth or redness around the area, increasing swelling, numbness or weakness to the extremity, color change or coldness to the extremity. Adrianne Joe!!!    From [facility] ([facility]) and [address] [place] [address] Emergency Services    On behalf of the Emergency Department staff at [facility]), I would like to thank you for giving us the opportunity to address your health care needs and concerns. We hope that during your visit, our service was delivered in a professional and caring manner. Please keep [facility] ([facility]) in mind as we walk with you down the path to your own personal wellness. Please expect an automated text message or email from us so we can ask a few questions about your health and progress. Based on your answers, a clinician may call you back to offer help and instructions. Please understand that early in the process of an illness or injury, an emergency department workup can be falsely reassuring. If you notice any worsening, changing or persistent symptoms please call your family doctor or return to the ER immediately. Tell us how we did during your visit at [email or web address]   and let us know about your experience

## 2023-05-02 NOTE — ED PROVIDER NOTES
[address] Emergency Department  [address] [address] [facility] [address]  Phone: [phone]  Fax: [phone]        Pt Name: Marin Deluna  MRN: [id number]  [place] 1995  Date of evaluation: 5/2/23    [address]       Chief Complaint   Patient presents with    Groin Swelling       HISTORY OF PRESENT ILLNESS (Location/Symptom, Timing/Onset, Context/Setting, Quality, Duration, Modifying Factors, Severity)      Marin Deluna is a 32 y.o. female with no pertinent PMH who presents to the ED via private auto with an abscess. Patient reports that about 2 weeks ago she noticed an ingrown hair and this is gradually worsened since the onset on the left labia. Reports it is very painful at this point. She has not tried to pop it and denies any discharge. Denies any history of similar occurrences. Denies any fever, chills, or any other concerns at this time. PAST MEDICAL / SURGICAL / SOCIAL / FAMILY HISTORY     PMH:  has a past medical history of Anemia, Anxiety, Bipolar disorder ([address] 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[address] Depression, Hypotension, Learning disability, Nausea & vomiting, Obesity, PONV (postoperative nausea and vomiting), Seizures ([address] and SOB (shortness of breath). Surgical History:  has a past surgical history that includes Tonsillectomy and adenoidectomy; intrauterine device insertion; Cholecystectomy, laparoscopic (5-20-16); [place] tooth extraction; Endometrial ablation (2018); Tubal ligation (2018); hernia repair; eye surgery (Left); Knee arthroscopy (Right, 6/18/2021); Upper gastrointestinal endoscopy (N/A, 7/1/2021); and Colonoscopy (N/A, 7/1/2021). Social History:  reports that she has been smoking cigarettes. She has a 5.00 pack-year smoking history. She has never used smokeless tobacco. She reports current drug use. Frequency: 7.00 times per week. Drug: Marijuana Yifan Collin). She reports that she does not drink alcohol.   Family History: She

## 2023-05-03 ENCOUNTER — TELEPHONE (OUTPATIENT)
Dept: FAMILY MEDICINE CLINIC | Age: 28
End: 2023-05-03

## 2023-05-03 NOTE — TELEPHONE ENCOUNTER
[facility] ([facility]) ED Follow up Call    Reason for ED visit:  Abscess    Phone has restrictions not allowing our call to complete    My chart message sent Telephone Encounter by Karen Pierre RN at 03/20/18 10:10 AM     Author:  Karen Pierre RN Service:  (none) Author Type:  Registered Nurse     Filed:  03/20/18 10:12 AM Encounter Date:  3/20/2018 Status:  Signed     :  Karen Pierre RN (Registered Nurse)            New pulm consult received 3/16/18, from Dr Zuniga, to be seen for abnormal pft and mild COPD, non-urgently  Dr Vega on call[GM1.1M]  Electronically Signed by:    Karen Pierre RN , 3/20/2018[GM1.2T]        Revision History        User Key Date/Time User Provider Type Action    > GM1.2 03/20/18 10:12 AM Karen Pierre RN Registered Nurse Sign     GM1.1 03/20/18 10:10 AM Karen Pierre, RN Registered Nurse     M - Manual, T - Template

## 2023-05-20 ENCOUNTER — APPOINTMENT (OUTPATIENT)
Dept: GENERAL RADIOLOGY | Age: 28
End: 2023-05-20
Payer: MEDICAID

## 2023-05-20 ENCOUNTER — HOSPITAL ENCOUNTER (EMERGENCY)
Age: 28
Discharge: HOME | End: 2023-05-20
Attending: EMERGENCY MEDICINE
Payer: MEDICAID

## 2023-05-20 VITALS
SYSTOLIC BLOOD PRESSURE: 131 MMHG | TEMPERATURE: 98.2 F | BODY MASS INDEX: 40.04 KG/M2 | WEIGHT: 226 LBS | OXYGEN SATURATION: 99 % | HEIGHT: 63 IN | HEART RATE: 80 BPM | RESPIRATION RATE: 18 BRPM | DIASTOLIC BLOOD PRESSURE: 81 MMHG

## 2023-05-20 DIAGNOSIS — S86.911A KNEE STRAIN, RIGHT, INITIAL ENCOUNTER: Primary | ICD-10-CM

## 2023-05-20 PROCEDURE — 73562 X-RAY EXAM OF KNEE 3: CPT

## 2023-05-20 PROCEDURE — 6370000000 HC RX 637 (ALT 250 FOR IP): Performed by: EMERGENCY MEDICINE

## 2023-05-20 PROCEDURE — 99283 EMERGENCY DEPT VISIT LOW MDM: CPT

## 2023-05-20 RX ORDER — OXYCODONE HYDROCHLORIDE AND ACETAMINOPHEN 5; 325 MG/1; MG/1
2 TABLET ORAL ONCE
Status: COMPLETED | OUTPATIENT
Start: 2023-05-20 | End: 2023-05-20

## 2023-05-20 RX ORDER — OXYCODONE HYDROCHLORIDE AND ACETAMINOPHEN 5; 325 MG/1; MG/1
1-2 TABLET ORAL EVERY 6 HOURS PRN
Qty: 15 TABLET | Refills: 0 | Status: SHIPPED | OUTPATIENT
Start: 2023-05-20 | End: 2023-05-27

## 2023-05-20 RX ADMIN — OXYCODONE HYDROCHLORIDE AND ACETAMINOPHEN 2 TABLET: 5; 325 TABLET ORAL at 17:20

## 2023-05-20 ASSESSMENT — PAIN SCALES - GENERAL: PAINLEVEL_OUTOF10: 4

## 2023-05-20 ASSESSMENT — PAIN - FUNCTIONAL ASSESSMENT: PAIN_FUNCTIONAL_ASSESSMENT: 0-10

## 2023-05-20 NOTE — DISCHARGE INSTRUCTIONS
Take medications as prescribed  Immobilizer and crutches for comfort  Follow-up with your orthopedist call for an appointment  Return immediately if any worsening symptoms or any other concerns    Tell us how we did visit: [email or web address]   and let us know about your experience

## 2023-05-20 NOTE — ED PROVIDER NOTES
[address] [facility] Emergency Department  [address] [address] [facility] [address]  Phone: [phone]  Fax: [phone]      Pt Name: Derrick Strange  MRN: [id number]  [place] 1995  Date of evaluation: 5/20/2023    CHIEF COMPLAINT       Chief Complaint   Patient presents with    Joint Swelling     Right knee       HISTORY OF PRESENT ILLNESS    Derrick Strange is a 32 y.o. female who presents to the emergency department complaining of right knee swelling and pain. Knee surgery in 2019 she says she injured it about a week ago when she kind of tripped. She was having some swelling earlier today but that is improved. She is complaining of pain over the tibial tuberosity. She did show me a picture on her phone. No calf pain or swelling. Pain 4 out of 10. She denies any other complaints. Worse with movement. Sharp. REVIEW OF SYSTEMS       Constitutional: No fevers or chills   Musculoskeletal: Right knee pain and swelling  Skin: No rash right lower extremity  Neurological: No numbness or weakness right lower extremity  PAST MEDICAL HISTORY    has a past medical history of Anemia, Anxiety, Bipolar disorder ([address] 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[address] Depression, Hypotension, Learning disability, Nausea & vomiting, Obesity, PONV (postoperative nausea and vomiting), Seizures ([address] and SOB (shortness of breath). SURGICAL HISTORY      has a past surgical history that includes Tonsillectomy and adenoidectomy; intrauterine device insertion; Cholecystectomy, laparoscopic (5-20-16); [place] tooth extraction; Endometrial ablation (2018); Tubal ligation (2018); hernia repair; eye surgery (Left); Knee arthroscopy (Right, 6/18/2021); Upper gastrointestinal endoscopy (N/A, 7/1/2021); and Colonoscopy (N/A, 7/1/2021).     CURRENT MEDICATIONS       Previous Medications    No medications on file       ALLERGIES     is allergic to latex, poison ivy extract, and sulfa

## 2023-05-22 ENCOUNTER — TELEPHONE (OUTPATIENT)
Dept: FAMILY MEDICINE CLINIC | Age: 28
End: 2023-05-22

## 2023-05-22 NOTE — TELEPHONE ENCOUNTER
[place] [facility] ([facility]) ED Follow up Call    Reason for ED visit:  Knee pain       Calling restrictions on phone number, unable to leave message

## 2023-05-29 ENCOUNTER — HOSPITAL ENCOUNTER (EMERGENCY)
Age: 28
Discharge: LEFT BEFORE DISCHARGE | End: 2023-05-29

## 2023-05-29 VITALS
DIASTOLIC BLOOD PRESSURE: 80 MMHG | TEMPERATURE: 98.6 F | SYSTOLIC BLOOD PRESSURE: 128 MMHG | WEIGHT: 226 LBS | HEART RATE: 65 BPM | HEIGHT: 63 IN | RESPIRATION RATE: 16 BRPM | BODY MASS INDEX: 40.04 KG/M2 | OXYGEN SATURATION: 100 %

## 2023-05-29 ASSESSMENT — PAIN SCALES - GENERAL: PAINLEVEL_OUTOF10: 7

## 2023-05-29 ASSESSMENT — PAIN - FUNCTIONAL ASSESSMENT: PAIN_FUNCTIONAL_ASSESSMENT: 0-10

## 2023-05-30 ENCOUNTER — TELEPHONE (OUTPATIENT)
Dept: FAMILY MEDICINE CLINIC | Age: 28
End: 2023-05-30

## 2023-05-31 ENCOUNTER — HOSPITAL ENCOUNTER (EMERGENCY)
Age: 28
Discharge: HOME | End: 2023-06-01
Attending: EMERGENCY MEDICINE
Payer: MEDICAID

## 2023-05-31 VITALS
TEMPERATURE: 98.1 F | RESPIRATION RATE: 16 BRPM | HEART RATE: 70 BPM | WEIGHT: 218 LBS | DIASTOLIC BLOOD PRESSURE: 79 MMHG | OXYGEN SATURATION: 99 % | HEIGHT: 63 IN | BODY MASS INDEX: 38.62 KG/M2 | SYSTOLIC BLOOD PRESSURE: 124 MMHG

## 2023-05-31 DIAGNOSIS — K08.89 PAIN, DENTAL: Primary | ICD-10-CM

## 2023-05-31 DIAGNOSIS — S02.5XXA CLOSED FRACTURE OF TOOTH, INITIAL ENCOUNTER: ICD-10-CM

## 2023-05-31 PROCEDURE — 6370000000 HC RX 637 (ALT 250 FOR IP): Performed by: PHYSICIAN ASSISTANT

## 2023-05-31 PROCEDURE — 99284 EMERGENCY DEPT VISIT MOD MDM: CPT

## 2023-05-31 PROCEDURE — 96372 THER/PROPH/DIAG INJ SC/IM: CPT

## 2023-05-31 PROCEDURE — 6360000002 HC RX W HCPCS: Performed by: PHYSICIAN ASSISTANT

## 2023-05-31 RX ORDER — ONDANSETRON 4 MG/1
4 TABLET, FILM COATED ORAL ONCE
Status: COMPLETED | OUTPATIENT
Start: 2023-05-31 | End: 2023-05-31

## 2023-05-31 RX ORDER — KETOROLAC TROMETHAMINE 30 MG/ML
30 INJECTION, SOLUTION INTRAMUSCULAR; INTRAVENOUS ONCE
Status: COMPLETED | OUTPATIENT
Start: 2023-05-31 | End: 2023-05-31

## 2023-05-31 RX ADMIN — ONDANSETRON HYDROCHLORIDE 4 MG: 4 TABLET, FILM COATED ORAL at 23:39

## 2023-05-31 RX ADMIN — KETOROLAC TROMETHAMINE 30 MG: 30 INJECTION, SOLUTION INTRAMUSCULAR; INTRAVENOUS at 23:38

## 2023-05-31 ASSESSMENT — PAIN SCALES - GENERAL: PAINLEVEL_OUTOF10: 8

## 2023-05-31 ASSESSMENT — PAIN - FUNCTIONAL ASSESSMENT: PAIN_FUNCTIONAL_ASSESSMENT: 0-10

## 2023-05-31 ASSESSMENT — PAIN DESCRIPTION - DESCRIPTORS: DESCRIPTORS: ACHING

## 2023-05-31 ASSESSMENT — PAIN DESCRIPTION - LOCATION: LOCATION: JAW;TEETH

## 2023-05-31 ASSESSMENT — PAIN DESCRIPTION - ORIENTATION: ORIENTATION: LEFT

## 2023-06-01 PROCEDURE — 6370000000 HC RX 637 (ALT 250 FOR IP): Performed by: PHYSICIAN ASSISTANT

## 2023-06-01 RX ORDER — CLINDAMYCIN HYDROCHLORIDE 150 MG/1
300 CAPSULE ORAL 4 TIMES DAILY
Qty: 56 CAPSULE | Refills: 0 | Status: SHIPPED | OUTPATIENT
Start: 2023-06-01 | End: 2023-06-08

## 2023-06-01 RX ORDER — CLINDAMYCIN HYDROCHLORIDE 150 MG/1
300 CAPSULE ORAL ONCE
Status: COMPLETED | OUTPATIENT
Start: 2023-06-01 | End: 2023-06-01

## 2023-06-01 RX ADMIN — CLINDAMYCIN HYDROCHLORIDE 300 MG: 150 CAPSULE ORAL at 00:27

## 2023-06-01 ASSESSMENT — ENCOUNTER SYMPTOMS
VOMITING: 1
COUGH: 0
EYE DISCHARGE: 0
SORE THROAT: 0
SHORTNESS OF BREATH: 0
BACK PAIN: 0
EYE REDNESS: 0
ABDOMINAL PAIN: 0
NAUSEA: 1

## 2023-06-01 NOTE — ED PROVIDER NOTES
[address] [facility] Emergency Department  [address] [address] [facility] [address] [facility] [address]  Phone: [phone]  Fax: [phone]      Pt Name: Alon Delgado  MRN: [id number]  [place] 1995  Date of evaluation: 5/31/2023      CHIEF COMPLAINT       Chief Complaint   Patient presents with    Dental Pain     Left lower tooth broken, has not been able to get into dentist, started yesterday with pain again and nausea       HISTORY OF PRESENT ILLNESS   (Location, Quality, Severity, Duration, Timing, Context, ModifyingFactors, Associated Signs and Symptoms)     Alon Delgado is a 32 y.o. female who presents to the ER for evaluation of dental pain. Patient states that she is having discomfort involving both a left upper molar and a left lower molar. These were the same teeth that give her problems a few weeks ago when she was seen evaluated in this ER and treated with antibiotics. Patient states that she has been unable to secure an appointment with a dentist.  She took 4 Aleve tablets around 10:30 PM to help with her pain. Patient states that she became nauseous and ended up vomiting in the parking lot. Patient rates her dental pain at 8/10. She states that she has so much discomfort she has not been able to eat and it is difficult to drink fluids. Patient smokes cigarettes. Patient has had no fevers or chills. Nursing Notes were reviewed. REVIEW OF SYSTEMS     (2-9 systems for level 4, 10 or more for level 5)    Review of Systems   Constitutional:  Negative for chills and fever. HENT:  Positive for dental problem. Negative for congestion and sore throat. Eyes:  Negative for discharge and redness. Respiratory:  Negative for cough and shortness of breath. Cardiovascular:  Negative for chest pain. Gastrointestinal:  Positive for nausea and vomiting. Negative for abdominal pain. Genitourinary:  Negative for dysuria and frequency.    Musculoskeletal:  Negative for back pain
mis-transcribed.)    Mari Kim DO, DO  Attending Emergency Physician        Mari Kim DO  05/31/23 [id number]

## 2023-06-01 NOTE — DISCHARGE INSTRUCTIONS
Please read and follow all instructions. Take alternating doses of Motrin and Tylenol as directed for pain. Take clindamycin 4 times daily over the next 7 days. Call the [facility] and help in locating a dentist.  Avoid smoking as this can lead to worsening dental pain. Return to the ER for worsening pain, facial swelling, fever above 101 °F, or any other concerning symptoms. Negative

## 2023-06-20 ENCOUNTER — OFFICE VISIT (OUTPATIENT)
Dept: PRIMARY CARE CLINIC | Age: 28
End: 2023-06-20
Payer: MEDICAID

## 2023-06-20 VITALS
BODY MASS INDEX: 38.62 KG/M2 | OXYGEN SATURATION: 98 % | TEMPERATURE: 99.6 F | HEART RATE: 73 BPM | WEIGHT: 218 LBS | SYSTOLIC BLOOD PRESSURE: 120 MMHG | DIASTOLIC BLOOD PRESSURE: 76 MMHG

## 2023-06-20 DIAGNOSIS — L30.8 PUSTULAR DERMATITIS: Primary | ICD-10-CM

## 2023-06-20 DIAGNOSIS — R21 RASH: ICD-10-CM

## 2023-06-20 PROCEDURE — 99213 OFFICE O/P EST LOW 20 MIN: CPT

## 2023-06-20 RX ORDER — DOXYCYCLINE HYCLATE 100 MG
100 TABLET ORAL 2 TIMES DAILY
Qty: 20 TABLET | Refills: 0 | Status: SHIPPED | OUTPATIENT
Start: 2023-06-20 | End: 2023-06-30

## 2023-06-20 RX ORDER — PREDNISONE 20 MG/1
20 TABLET ORAL DAILY
Qty: 7 TABLET | Refills: 0 | Status: SHIPPED | OUTPATIENT
Start: 2023-06-20 | End: 2023-06-20 | Stop reason: ALTCHOICE

## 2023-06-20 RX ORDER — TRIAMCINOLONE ACETONIDE 40 MG/ML
40 INJECTION, SUSPENSION INTRA-ARTICULAR; INTRAMUSCULAR ONCE
Status: COMPLETED | OUTPATIENT
Start: 2023-06-20 | End: 2023-06-20

## 2023-06-20 RX ADMIN — TRIAMCINOLONE ACETONIDE 40 MG: 40 INJECTION, SUSPENSION INTRA-ARTICULAR; INTRAMUSCULAR at 14:18

## 2023-06-20 ASSESSMENT — ENCOUNTER SYMPTOMS
COLOR CHANGE: 1
RESPIRATORY NEGATIVE: 1

## 2023-06-20 NOTE — PROGRESS NOTES
[address] [place]  [facility] [address]    [address] [place]  [address] [place] [id number]  Dept: [phone]    Prince Byers is a 32 y.o. female Established patient, who presents to the walk-in clinic today with conditions/complaints as noted below:    Chief Complaint   Patient presents with    Rash     On chest and breasts started Sat. It itches- doesn't hurt. Has not taken any allergy med otc. Has used anti-itch cream on it. HPI:     Patient is a 20-year-old female that presents today for a rash. States that it appeared on Saturday and hasn't changed. It's located on her upper chest/breast. Reports associated redness, itching, and bumps. Denies pain or discharge. No new medications, plant exposure, or detergent/soaps. She's unsure what could've caused it. She has tried an over-the-counter steroid ointment without improvement. Denies fevers, recent illness, or systemic symptoms.       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[facility])     Depression     Hypotension     side effect of Inderal and Catapres taken for anxiety    Learning disability     Non-verbal learning disability, has no real concept of time, money, misinterprets body language    Nausea & vomiting     been going on since June/July 2020, is being worked up by Dr Carolee Walker    Obesity     PONV (postoperative nausea and vomiting)     usually has a scopalamine patch for surgery    Seizures ([facility] [address]     had one seizure in early adolescence none since    SOB (shortness of breath)     with activity at times, unable to climb a flight of stairs without SOB       Current Outpatient Medications   Medication Sig Dispense Refill    triamcinolone

## 2023-06-20 NOTE — PATIENT INSTRUCTIONS
Finish the entire course of antibiotic treatment. Apply topical steroid ointment twice daily for 1-2 weeks. Use Benadryl as needed for itching. Follow-up if worsening or no improvement.

## 2023-07-16 ENCOUNTER — HOSPITAL ENCOUNTER (EMERGENCY)
Age: 28
Discharge: HOME | End: 2023-07-16
Attending: EMERGENCY MEDICINE
Payer: MEDICAID

## 2023-07-16 VITALS
HEIGHT: 63 IN | DIASTOLIC BLOOD PRESSURE: 78 MMHG | WEIGHT: 150 LBS | BODY MASS INDEX: 26.58 KG/M2 | HEART RATE: 70 BPM | RESPIRATION RATE: 18 BRPM | SYSTOLIC BLOOD PRESSURE: 122 MMHG | TEMPERATURE: 98.8 F | OXYGEN SATURATION: 97 %

## 2023-07-16 DIAGNOSIS — F12.90 CANNABINOID HYPEREMESIS SYNDROME: Primary | ICD-10-CM

## 2023-07-16 DIAGNOSIS — R11.2 CANNABINOID HYPEREMESIS SYNDROME: Primary | ICD-10-CM

## 2023-07-16 LAB
ALBUMIN SERPL-MCNC: 4.1 G/DL (ref 3.5–5.2)
ALBUMIN/GLOB SERPL: 1.8 {RATIO} (ref 1–2.5)
ALP SERPL-CCNC: 63 U/L (ref 35–104)
ALT SERPL-CCNC: 14 U/L (ref 5–33)
AMPHET UR QL SCN: NEGATIVE
ANION GAP SERPL CALCULATED.3IONS-SCNC: 11 MMOL/L (ref 9–17)
AST SERPL-CCNC: 13 U/L
BACTERIA URNS QL MICRO: ABNORMAL
BARBITURATES UR QL SCN: NEGATIVE
BASOPHILS # BLD: 0 K/UL (ref 0–0.2)
BASOPHILS NFR BLD: 1 % (ref 0–2)
BENZODIAZ UR QL: NEGATIVE
BILIRUB DIRECT SERPL-MCNC: 0.1 MG/DL
BILIRUB INDIRECT SERPL-MCNC: 0.4 MG/DL (ref 0–1)
BILIRUB SERPL-MCNC: 0.5 MG/DL (ref 0.3–1.2)
BILIRUB UR QL STRIP: NEGATIVE
BUN SERPL-MCNC: 7 MG/DL (ref 6–20)
CALCIUM SERPL-MCNC: 9.3 MG/DL (ref 8.6–10.4)
CANNABINOIDS UR QL SCN: POSITIVE
CHARACTER UR: ABNORMAL
CHLORIDE SERPL-SCNC: 104 MMOL/L (ref 98–107)
CLARITY UR: CLEAR
CO2 SERPL-SCNC: 24 MMOL/L (ref 20–31)
COCAINE UR QL SCN: NEGATIVE
COLOR UR: YELLOW
CREAT SERPL-MCNC: 0.6 MG/DL (ref 0.5–0.9)
EOSINOPHIL # BLD: 0.2 K/UL (ref 0–0.4)
EOSINOPHILS RELATIVE PERCENT: 2 % (ref 1–4)
EPI CELLS #/AREA URNS HPF: ABNORMAL /HPF (ref 0–5)
ERYTHROCYTE [DISTWIDTH] IN BLOOD BY AUTOMATED COUNT: 13.9 % (ref 12.5–15.4)
FENTANYL UR QL: NEGATIVE
GFR SERPL CREATININE-BSD FRML MDRD: >60 ML/MIN/1.73M2
GLUCOSE SERPL-MCNC: 95 MG/DL (ref 70–99)
GLUCOSE UR STRIP-MCNC: NEGATIVE MG/DL
HCT VFR BLD AUTO: 40.1 % (ref 36–46)
HGB BLD-MCNC: 13.6 G/DL (ref 12–16)
HGB UR QL STRIP.AUTO: NEGATIVE
KETONES UR STRIP-MCNC: ABNORMAL MG/DL
LEUKOCYTE ESTERASE UR QL STRIP: ABNORMAL
LIPASE SERPL-CCNC: 43 U/L (ref 13–60)
LYMPHOCYTES # BLD: 18 % (ref 24–44)
LYMPHOCYTES NFR BLD: 1.4 K/UL (ref 1–4.8)
MAGNESIUM SERPL-MCNC: 1.9 MG/DL (ref 1.6–2.6)
MCH RBC QN AUTO: 32.4 PG (ref 26–34)
MCHC RBC AUTO-ENTMCNC: 34 G/DL (ref 31–37)
MCV RBC AUTO: 95.2 FL (ref 80–100)
METHADONE UR QL: NEGATIVE
MONOCYTES NFR BLD: 0.5 K/UL (ref 0.1–1.2)
MONOCYTES NFR BLD: 7 % (ref 2–11)
MUCOUS THREADS URNS QL MICRO: ABNORMAL
NEUTROPHILS NFR BLD: 72 % (ref 36–66)
NEUTS SEG NFR BLD: 5.6 K/UL (ref 1.8–7.7)
NITRITE UR QL STRIP: NEGATIVE
OPIATES UR QL SCN: NEGATIVE
OXYCODONE UR QL SCN: NEGATIVE
PCP UR QL SCN: NEGATIVE
PH UR STRIP: 6.5 [PH] (ref 5–8)
PLATELET # BLD AUTO: 150 K/UL (ref 140–450)
PMV BLD AUTO: 9.3 FL (ref 6–12)
POTASSIUM SERPL-SCNC: 3.5 MMOL/L (ref 3.7–5.3)
PROT SERPL-MCNC: 6.4 G/DL (ref 6.4–8.3)
PROT UR STRIP-MCNC: NEGATIVE MG/DL
RBC # BLD AUTO: 4.22 M/UL (ref 4–5.2)
RBC #/AREA URNS HPF: ABNORMAL /HPF (ref 0–2)
SODIUM SERPL-SCNC: 139 MMOL/L (ref 135–144)
SP GR UR STRIP: 1.01 (ref 1–1.03)
TEST INFORMATION: ABNORMAL
UROBILINOGEN UR STRIP-ACNC: NORMAL EU/DL (ref 0–1)
WBC #/AREA URNS HPF: ABNORMAL /HPF (ref 0–5)
WBC OTHER # BLD: 7.7 K/UL (ref 3.5–11)

## 2023-07-16 PROCEDURE — 6360000002 HC RX W HCPCS: Performed by: EMERGENCY MEDICINE

## 2023-07-16 PROCEDURE — 81001 URINALYSIS AUTO W/SCOPE: CPT

## 2023-07-16 PROCEDURE — 96375 TX/PRO/DX INJ NEW DRUG ADDON: CPT

## 2023-07-16 PROCEDURE — 2580000003 HC RX 258: Performed by: EMERGENCY MEDICINE

## 2023-07-16 PROCEDURE — 80048 BASIC METABOLIC PNL TOTAL CA: CPT

## 2023-07-16 PROCEDURE — 99284 EMERGENCY DEPT VISIT MOD MDM: CPT

## 2023-07-16 PROCEDURE — 80307 DRUG TEST PRSMV CHEM ANLYZR: CPT

## 2023-07-16 PROCEDURE — 83735 ASSAY OF MAGNESIUM: CPT

## 2023-07-16 PROCEDURE — 83690 ASSAY OF LIPASE: CPT

## 2023-07-16 PROCEDURE — 36415 COLL VENOUS BLD VENIPUNCTURE: CPT

## 2023-07-16 PROCEDURE — 96374 THER/PROPH/DIAG INJ IV PUSH: CPT

## 2023-07-16 PROCEDURE — 85027 COMPLETE CBC AUTOMATED: CPT

## 2023-07-16 PROCEDURE — 80076 HEPATIC FUNCTION PANEL: CPT

## 2023-07-16 RX ORDER — DROPERIDOL 2.5 MG/ML
1.25 INJECTION, SOLUTION INTRAMUSCULAR; INTRAVENOUS ONCE
Status: COMPLETED | OUTPATIENT
Start: 2023-07-16 | End: 2023-07-16

## 2023-07-16 RX ORDER — 0.9 % SODIUM CHLORIDE 0.9 %
1000 INTRAVENOUS SOLUTION INTRAVENOUS ONCE
Status: COMPLETED | OUTPATIENT
Start: 2023-07-16 | End: 2023-07-16

## 2023-07-16 RX ORDER — LORAZEPAM 2 MG/ML
1 INJECTION INTRAMUSCULAR ONCE
Status: COMPLETED | OUTPATIENT
Start: 2023-07-16 | End: 2023-07-16

## 2023-07-16 RX ADMIN — DROPERIDOL 1.25 MG: 2.5 INJECTION, SOLUTION INTRAMUSCULAR; INTRAVENOUS at 11:30

## 2023-07-16 RX ADMIN — SODIUM CHLORIDE 1000 ML: 9 INJECTION, SOLUTION INTRAVENOUS at 11:38

## 2023-07-16 RX ADMIN — LORAZEPAM 1 MG: 2 INJECTION INTRAMUSCULAR; INTRAVENOUS at 12:17

## 2023-07-16 ASSESSMENT — PAIN DESCRIPTION - DESCRIPTORS: DESCRIPTORS: CRAMPING

## 2023-07-16 ASSESSMENT — PAIN - FUNCTIONAL ASSESSMENT: PAIN_FUNCTIONAL_ASSESSMENT: 0-10

## 2023-07-16 ASSESSMENT — PAIN DESCRIPTION - ORIENTATION: ORIENTATION: LOWER

## 2023-07-16 ASSESSMENT — PAIN SCALES - GENERAL: PAINLEVEL_OUTOF10: 4

## 2023-07-16 ASSESSMENT — PAIN DESCRIPTION - PAIN TYPE: TYPE: ACUTE PAIN

## 2023-07-16 ASSESSMENT — PAIN DESCRIPTION - LOCATION: LOCATION: ABDOMEN

## 2024-02-12 ENCOUNTER — HOSPITAL ENCOUNTER (EMERGENCY)
Age: 29
Discharge: HOME | End: 2024-02-12
Attending: EMERGENCY MEDICINE
Payer: COMMERCIAL

## 2024-02-12 VITALS
TEMPERATURE: 98 F | DIASTOLIC BLOOD PRESSURE: 72 MMHG | HEART RATE: 62 BPM | BODY MASS INDEX: 32.78 KG/M2 | WEIGHT: 185 LBS | HEIGHT: 63 IN | SYSTOLIC BLOOD PRESSURE: 120 MMHG | RESPIRATION RATE: 14 BRPM | OXYGEN SATURATION: 100 %

## 2024-02-12 DIAGNOSIS — K02.9 DENTAL CARIES: ICD-10-CM

## 2024-02-12 DIAGNOSIS — K08.89 PAIN, DENTAL: Primary | ICD-10-CM

## 2024-02-12 PROCEDURE — 99283 EMERGENCY DEPT VISIT LOW MDM: CPT

## 2024-02-12 RX ORDER — NAPROXEN 500 MG/1
500 TABLET ORAL 2 TIMES DAILY PRN
Qty: 20 TABLET | Refills: 0 | Status: SHIPPED | OUTPATIENT
Start: 2024-02-12 | End: 2024-02-22

## 2024-02-12 RX ORDER — AMOXICILLIN 500 MG/1
500 CAPSULE ORAL 3 TIMES DAILY
Qty: 21 CAPSULE | Refills: 0 | Status: SHIPPED | OUTPATIENT
Start: 2024-02-12 | End: 2024-02-19

## 2024-02-12 NOTE — ED PROVIDER NOTES
days    Substances: Nicotine   Substance and Sexual Activity    Alcohol use: No     Alcohol/week: 0.0 standard drinks of alcohol     Comment: 1/2016-stopped drinking    Drug use: Yes     Frequency: 7.0 times per week     Types: Marijuana (Weed)     Comment: 1/2016-daily marijuana use, improves appetite and helps her sleep    Sexual activity: Yes     Partners: Male     Birth control/protection: Condom     Social Determinants of Health     Financial Resource Strain: Low Risk  (4/7/2021)    Overall Financial Resource Strain (CARDIA)     Difficulty of Paying Living Expenses: Not hard at all   Food Insecurity: No Food Insecurity (4/7/2021)    Hunger Vital Sign     Worried About Running Out of Food in the Last Year: Never true     Ran Out of Food in the Last Year: Never true   Transportation Needs: Unmet Transportation Needs (4/7/2021)    PRAPARE - Transportation     Lack of Transportation (Medical): Yes     Lack of Transportation (Non-Medical): Yes       SCREENINGS    [place] Coma Scale  Eye Opening: Spontaneous  Best Verbal Response: Oriented  Best Motor Response: Obeys commands  Saw Coma Scale Score: 15        PHYSICAL EXAM    (up to 7 for level 4, 8 or more for level 5)     ED Triage Vitals [02/12/24 1507]   BP Temp Temp src Pulse Respirations SpO2 Height Weight - Scale   120/72 98 °F (36.7 °C) -- 62 14 100 % 1.6 m (5' 3\") 83.9 kg (185 lb)       Physical Exam  HENT:      Mouth/Throat:      Mouth: Mucous membranes are moist. No injury, lacerations, oral lesions or angioedema.      Dentition: Does not have dentures. Dental tenderness present. No gingival swelling, dental abscesses or gum lesions.      Tongue: No lesions. Tongue does not deviate from midline.      Pharynx: Oropharynx is clear. Uvula midline.           EMERGENCY DEPARTMENT COURSE and DIFFERENTIAL DIAGNOSIS/MDM:   Vitals:    Vitals:    02/12/24 1507   BP: 120/72   Pulse: 62   Resp: 14   Temp: 98 °F (36.7 °C)   SpO2: 100%   Weight: 83.9 kg (185 lb)

## 2024-02-12 NOTE — DISCHARGE INSTRUCTIONS
Follow-up with [facility] if you are unable to get in with any other dentist.  Take antibiotics as prescribed.  Return for worsening pain redness swelling fever or any other concerns

## 2024-07-24 ENCOUNTER — TELEPHONE (OUTPATIENT)
Dept: FAMILY MEDICINE CLINIC | Age: 29
End: 2024-07-24

## 2024-07-24 NOTE — TELEPHONE ENCOUNTER
Surgical clearance form received for patients upcoming total laparoscopic hysterectomy, bilateral salpingectomy and cystoscopy.     DOS: 9/10/24  Location: [facility]  Dr. Annemarie Camacho    Unable to reach patient, no VM option. Has not been seen since 2021 and patient will require pre-op appointment.

## 2024-08-17 SDOH — HEALTH STABILITY: PHYSICAL HEALTH: ON AVERAGE, HOW MANY DAYS PER WEEK DO YOU ENGAGE IN MODERATE TO STRENUOUS EXERCISE (LIKE A BRISK WALK)?: 0 DAYS

## 2024-08-17 SDOH — HEALTH STABILITY: PHYSICAL HEALTH: ON AVERAGE, HOW MANY MINUTES DO YOU ENGAGE IN EXERCISE AT THIS LEVEL?: 0 MIN

## 2024-08-20 ENCOUNTER — OFFICE VISIT (OUTPATIENT)
Dept: FAMILY MEDICINE CLINIC | Age: 29
End: 2024-08-20

## 2024-08-20 VITALS
OXYGEN SATURATION: 98 % | HEIGHT: 64 IN | SYSTOLIC BLOOD PRESSURE: 112 MMHG | DIASTOLIC BLOOD PRESSURE: 78 MMHG | HEART RATE: 71 BPM | TEMPERATURE: 97.5 F | BODY MASS INDEX: 39.71 KG/M2 | WEIGHT: 232.6 LBS | RESPIRATION RATE: 18 BRPM

## 2024-08-20 DIAGNOSIS — N94.89 PELVIC CONGESTION: ICD-10-CM

## 2024-08-20 DIAGNOSIS — D69.1 PLATELET DENSE GRANULE DEFICIENCY (HCC): ICD-10-CM

## 2024-08-20 DIAGNOSIS — N93.9 VAGINAL BLEEDING: ICD-10-CM

## 2024-08-20 DIAGNOSIS — Z13.31 SCREENING FOR DEPRESSION: ICD-10-CM

## 2024-08-20 DIAGNOSIS — Z01.818 ENCOUNTER FOR PREADMISSION TESTING: ICD-10-CM

## 2024-08-20 DIAGNOSIS — Z01.818 PREOP GENERAL PHYSICAL EXAM: Primary | ICD-10-CM

## 2024-08-20 RX ORDER — BUPROPION HYDROCHLORIDE 150 MG/1
TABLET ORAL
COMMUNITY
Start: 2024-08-01

## 2024-08-20 RX ORDER — HYDROXYZINE HYDROCHLORIDE 25 MG/1
25 TABLET, FILM COATED ORAL 3 TIMES DAILY PRN
COMMUNITY

## 2024-08-20 RX ORDER — CARIPRAZINE 1.5 MG/1
1.5 CAPSULE, GELATIN COATED ORAL DAILY
COMMUNITY
Start: 2024-08-01

## 2024-08-20 RX ORDER — PRAZOSIN HYDROCHLORIDE 5 MG/1
5 CAPSULE ORAL NIGHTLY
COMMUNITY
Start: 2024-08-01

## 2024-08-20 RX ORDER — CLONAZEPAM 0.5 MG/1
0.5 TABLET ORAL PRN
COMMUNITY
Start: 2024-08-01

## 2024-08-20 ASSESSMENT — PATIENT HEALTH QUESTIONNAIRE - PHQ9
SUM OF ALL RESPONSES TO PHQ QUESTIONS 1-9: 15
3. TROUBLE FALLING OR STAYING ASLEEP: NEARLY EVERY DAY
SUM OF ALL RESPONSES TO PHQ QUESTIONS 1-9: 15
5. POOR APPETITE OR OVEREATING: MORE THAN HALF THE DAYS
6. FEELING BAD ABOUT YOURSELF - OR THAT YOU ARE A FAILURE OR HAVE LET YOURSELF OR YOUR FAMILY DOWN: MORE THAN HALF THE DAYS
SUM OF ALL RESPONSES TO PHQ9 QUESTIONS 1 & 2: 4
2. FEELING DOWN, DEPRESSED OR HOPELESS: MORE THAN HALF THE DAYS
SUM OF ALL RESPONSES TO PHQ QUESTIONS 1-9: 15
1. LITTLE INTEREST OR PLEASURE IN DOING THINGS: MORE THAN HALF THE DAYS
10. IF YOU CHECKED OFF ANY PROBLEMS, HOW DIFFICULT HAVE THESE PROBLEMS MADE IT FOR YOU TO DO YOUR WORK, TAKE CARE OF THINGS AT HOME, OR GET ALONG WITH OTHER PEOPLE: SOMEWHAT DIFFICULT
4. FEELING TIRED OR HAVING LITTLE ENERGY: MORE THAN HALF THE DAYS
SUM OF ALL RESPONSES TO PHQ QUESTIONS 1-9: 15
8. MOVING OR SPEAKING SO SLOWLY THAT OTHER PEOPLE COULD HAVE NOTICED. OR THE OPPOSITE, BEING SO FIGETY OR RESTLESS THAT YOU HAVE BEEN MOVING AROUND A LOT MORE THAN USUAL: MORE THAN HALF THE DAYS
7. TROUBLE CONCENTRATING ON THINGS, SUCH AS READING THE NEWSPAPER OR WATCHING TELEVISION: NOT AT ALL
9. THOUGHTS THAT YOU WOULD BE BETTER OFF DEAD, OR OF HURTING YOURSELF: NOT AT ALL

## 2024-08-20 NOTE — PROGRESS NOTES
[facility]   [address] [address]  [phone]    8/20/24     Patient ID  Nicole Vernon is a 28 y.o. female  Established patient    Chief Complaint  Nicole Vernon presents today for New Patient and Pre-op Exam (Total laparoscopic hysterectomy, bilateral salpingectomy and cystoscopy. DOS: 9/10/24/Location: [facility]/Dr. Annemarie Camacho)    Have you seen any other physician or provider since your last visit? Yes - Records Obtained OBGYN- Dr. Camacho   Have you had any other diagnostic tests since your last visit? Yes - Records Obtained  Have you been seen in the emergency room and/or had an admission to a hospital since we last saw you? Yes - Records Obtained     ASSESSMENT/PLAN  1. Preop general physical exam  2. Pelvic congestion  3. Vaginal bleeding  4. Platelet dense granule deficiency (HCC)  5. Encounter for preadmission testing  -     Urinalysis; Future  -     CBC; Future  -     Basic Metabolic Panel; Future  -     EKG 12 Lead; Future  6. Screening for depression     Prior to given surgical clearance will need to review blood work as well as EKG.  Patient has preadmission testing scheduled at [facility] on 8/29/2024.  Patient has had surgery in the past with no concerns with anesthesia      Return in about 4 months (around 12/20/2024), or if symptoms worsen or fail to improve, for annual physical.      Patient Care Team:  Karolina Brody APRN - CNP as PCP - General (Nurse Practitioner Family)  Karolina Brody APRN - CNP as PCP - Empaneled Provider  Annemarie Camacho MD (Obstetrics & Gynecology)  Javier Elliott MD as Consulting Physician (Gastroenterology)  Sage Cao DO as Consulting Physician (Orthopedic Surgery)    SUBJECTIVE/OBJECTIVE  History of Present illness / Visit Summary   Patient not seen in over 3 years  Needs preoperative iraj for hysterectomy  Unsure if ovaries will be removed  Patient has been seen in ER many times for

## 2024-08-27 ENCOUNTER — TELEPHONE (OUTPATIENT)
Dept: FAMILY MEDICINE CLINIC | Age: 29
End: 2024-08-27

## 2024-08-27 NOTE — TELEPHONE ENCOUNTER
Kanchan from Dr. Annemarie Camacho office is calling asking for medical clearance to be fax to the office at [phone].    Please advise

## 2024-08-27 NOTE — TELEPHONE ENCOUNTER
Patient's preadmission testing is not scheduled at the hospital until 8/29/2024 at [facility].  I cannot officially give clearance without the lab work and the EKG that will be completed at that time.

## 2024-08-29 ASSESSMENT — ENCOUNTER SYMPTOMS
ABDOMINAL PAIN: 1
ABDOMINAL DISTENTION: 1

## 2024-09-03 ENCOUNTER — HOSPITAL ENCOUNTER (OUTPATIENT)
Age: 29
Setting detail: SPECIMEN
Discharge: HOME | End: 2024-09-03

## 2024-09-03 LAB
BACTERIA URNS QL MICRO: NORMAL
BILIRUB UR QL STRIP: NEGATIVE
CASTS #/AREA URNS LPF: NORMAL /LPF (ref 0–8)
CLARITY UR: CLEAR
COLOR UR: YELLOW
EPI CELLS #/AREA URNS HPF: NORMAL /HPF (ref 0–5)
GLUCOSE UR STRIP-MCNC: NEGATIVE MG/DL
HGB UR QL STRIP.AUTO: NEGATIVE
KETONES UR STRIP-MCNC: NEGATIVE MG/DL
LEUKOCYTE ESTERASE UR QL STRIP: ABNORMAL
NITRITE UR QL STRIP: NEGATIVE
PH UR STRIP: 5.5 [PH] (ref 5–8)
PROT UR STRIP-MCNC: NEGATIVE MG/DL
RBC #/AREA URNS HPF: NORMAL /HPF (ref 0–4)
SP GR UR STRIP: 1.02 (ref 1–1.03)
UROBILINOGEN UR STRIP-ACNC: NORMAL EU/DL (ref 0–1)
WBC #/AREA URNS HPF: NORMAL /HPF (ref 0–5)

## 2025-02-25 NOTE — TELEPHONE ENCOUNTER
Medication is no longer needed at this time.
Patient was contacted about the hospital follow up. Patient parent states that she was seen in the ED yesterday and was given Xanax after a panic attack. Patient no longer needs medication. Parent will have patient contact the office to schedule Hospital/ED follow up.
Patient/mother contacted the office requesting a short term script for Xanax be sent to [facility]. Patient was seen at [facility] and was prescribed prednisone which has been causing increased anxiety before she does a breathing treatment. Patient was given xanax at the hospital to take 30 minutes for a breathing treatment.
Xanax not appropriate without assessment. She needs an appointment for hospital follow up.  I have already sent this request.
18

## (undated) DEVICE — SUTURE VCRL SZ 0 L36IN ABSRB UD L36MM CT-1 1/2 CIR J946H

## (undated) DEVICE — DISC SUCT FLR DLX QUICKSUITE

## (undated) DEVICE — ADHESIVE SKIN CLSR 0.7ML TOP DERMBND ADV

## (undated) DEVICE — BNDG,ELSTC,MATRIX,STRL,6"X5YD,LF,HOOK&LP: Brand: MEDLINE

## (undated) DEVICE — [AGGRESSIVE PLUS CUTTER, ARTHROSCOPIC SHAVER BLADE,  DO NOT RESTERILIZE,  DO NOT USE IF PACKAGE IS DAMAGED,  KEEP DRY,  KEEP AWAY FROM SUNLIGHT]: Brand: FORMULA

## (undated) DEVICE — KIT ORTH INCL CLLRD BRKWY AND TUBEROSITY PIN CUT BLK DISP

## (undated) DEVICE — Z INACTIVE USE 2660664 SOLUTION IRRIG 3000ML 0.9% SOD CHL USP UROMATIC PLAS CONT

## (undated) DEVICE — DRILL SURG FOR 5 L COMPR FULL THRD SCR

## (undated) DEVICE — TUBING FLD MGMT Y DBL SPIK DUALWAVE

## (undated) DEVICE — FORCEPS BX L240CM WRK CHN 2.8MM STD CAP W/ NDL MIC MESH

## (undated) DEVICE — YANKAUER,FLEXIBLE HANDLE,REGLR CAPACITY: Brand: MEDLINE INDUSTRIES, INC.

## (undated) DEVICE — SUTURE VCRL SZ 2 L27IN ABSRB VLT L65MM TP-1 1/2 CIR J649G

## (undated) DEVICE — SUPER TURBOVAC 90 INTEGRATED CABLE WAND ICW: Brand: COBLATION

## (undated) DEVICE — INTENDED FOR TISSUE SEPARATION, AND OTHER PROCEDURES THAT REQUIRE A SHARP SURGICAL BLADE TO PUNCTURE OR CUT.: Brand: BARD-PARKER ® CARBON RIB-BACK BLADES

## (undated) DEVICE — ERBE NESSY® OMEGA PLATE USA (85+23)CM² , WITH CABLE 3 M: Brand: ERBE

## (undated) DEVICE — BITEBLOCK 54FR W/ DENT RIM BLOX

## (undated) DEVICE — FORCEPS BX L L240CM DIA2.4MM RAD JAW 4 HOT FOR POLYP DISP

## (undated) DEVICE — SUTURE FIBERLOOP SZ 2-0 L20IN NONABSORBABLE BLU STR NDL AR7234

## (undated) DEVICE — SST TWIST DRILL, AO TYPE, 2MM DIA. X 75MM: Brand: MICROAIRE®

## (undated) DEVICE — SUTURE ETHLN SZ 3-0 L18IN NONABSORBABLE BLK L24MM PS-1 3/8 1663G

## (undated) DEVICE — BIT DRL DIA3.2MM STR CANN DISP

## (undated) DEVICE — PAD COOL W10.9XL11.3IN UNIV REG HOSE WRP ON THER CLD

## (undated) DEVICE — GUIDEWIRE ARTHSCP L9.25IN DIA0.62IN W/ TRCR TIP

## (undated) DEVICE — Device

## (undated) DEVICE — PREP-RESISTANT MARKER W/ RULER: Brand: MEDLINE INDUSTRIES, INC.

## (undated) DEVICE — ENDO KIT W/SYRINGE: Brand: MEDLINE INDUSTRIES, INC.

## (undated) DEVICE — POUCH INSTR W6.75XL11.5IN FRST 2 PKT ADH FOR ORTH AND

## (undated) DEVICE — SUTURE MCRYL SZ 3-0 L27IN ABSRB UD PS-2 3/8 CIR REV CUT NDL MCP427H

## (undated) DEVICE — Z DISCONTINUED USE 2744636  DRESSING AQUACEL 14 IN ALG W3.5XL14IN POLYUR FLM CVR W/ HYDRCOLL

## (undated) DEVICE — DRAIN SURG PENROSE 0.25X18 IN CLOSED WND DRAINAGE PREM SIL

## (undated) DEVICE — DEFENDO AIR WATER SUCTION AND BIOPSY VALVE KIT FOR  OLYMPUS: Brand: DEFENDO AIR/WATER/SUCTION AND BIOPSY VALVE

## (undated) DEVICE — SYRINGE IRRIG 60ML SFT PLIABLE BLB EZ TO GRP 1 HND USE W/

## (undated) DEVICE — TUBING, SUCTION, 1/4" X 12', STRAIGHT: Brand: MEDLINE

## (undated) DEVICE — CANNULA ARTHSCP L3CM DIA12MM DBL DAM 1 PC MOLD LO PROF FLNG

## (undated) DEVICE — STRYKER PERFORMANCE SERIES SAGITTAL BLADE: Brand: STRYKER PERFORMANCE SERIES

## (undated) DEVICE — GLOVE SURG SZ 85 L12IN FNGR ORTHO 126MIL CRM LTX FREE

## (undated) DEVICE — SUTURE FIBERWIRE FIBERSTICK SZ 2-0 L50/12IN NONABSORBABLE AR7209

## (undated) DEVICE — TUBING PMP L16FT MAIN DISP FOR AR-6400 AR-6475

## (undated) DEVICE — PENCIL ES L3M BTTN SWCH HOLSTER W/ BLDE ELECTRD EDGE

## (undated) DEVICE — GOWN,AURORA,NON-REINFORCED,2XL: Brand: MEDLINE

## (undated) DEVICE — 3.0 MM INTEGRATED CABLE WAND ICW,                                    SABER 30, 30 DEGREE: Brand: COBLATION

## (undated) DEVICE — KIT INSTR TRANSTIBIAL CRUCE W/O SAW BLDE DISP

## (undated) DEVICE — FAST-FIX 360 STRAIGHT KNOT                                    PUSHER/CUTTER AND SLOTTED CANNULA SETS: Brand: FAST-FIX

## (undated) DEVICE — C-ARMOR C-ARM EQUIPMENT COVERS CLEAR STERILE UNIVERSAL FIT 12 PER CASE: Brand: C-ARMOR

## (undated) DEVICE — BLANKET WRM W29.9XL79.1IN UP BODY FORC AIR MISTRAL-AIR

## (undated) DEVICE — COVER,TABLE,HEAVY DUTY,50"X90",STRL: Brand: MEDLINE

## (undated) DEVICE — APPLICATOR MEDICATED 26 CC SOLUTION HI LT ORNG CHLORAPREP

## (undated) DEVICE — Z DUP USE 2650846 BRACE KNEE UNIV L46 65CM THGH 76CM LTWT EZ TO USE HNG EZ TO

## (undated) DEVICE — BLADE SAGITAL 18X90X1.27MM

## (undated) DEVICE — SUTURE VCRL SZ 2-0 L27IN ABSRB UD L26MM CT-2 1/2 CIR J269H

## (undated) DEVICE — WAX SURG 2.5GM HEMSTAT BNE BEESWAX PARAFFIN ISO PALMITATE

## (undated) DEVICE — SUTURE SUTTAPE L40IN DIA1.3MM NONABSORBABLE WHT BLU L26.5MM AR7500

## (undated) DEVICE — DRESSING PETRO W3XL8IN OIL EMUL N ADH GZ KNIT IMPREG CELOS